# Patient Record
Sex: FEMALE | Race: WHITE
[De-identification: names, ages, dates, MRNs, and addresses within clinical notes are randomized per-mention and may not be internally consistent; named-entity substitution may affect disease eponyms.]

---

## 2017-05-02 ENCOUNTER — HOSPITAL (OUTPATIENT)
Dept: OTHER | Age: 42
End: 2017-05-02
Attending: INTERNAL MEDICINE

## 2017-06-08 ENCOUNTER — PRIOR ORIGINAL RECORDS (OUTPATIENT)
Dept: OTHER | Age: 42
End: 2017-06-08

## 2018-05-14 ENCOUNTER — LAB ENCOUNTER (OUTPATIENT)
Dept: LAB | Facility: HOSPITAL | Age: 43
End: 2018-05-14
Attending: INTERNAL MEDICINE
Payer: MEDICAID

## 2018-05-14 DIAGNOSIS — R53.83 FATIGUE, UNSPECIFIED TYPE: ICD-10-CM

## 2018-05-14 DIAGNOSIS — M25.50 POLYARTHRALGIA: ICD-10-CM

## 2018-05-14 PROCEDURE — 86038 ANTINUCLEAR ANTIBODIES: CPT

## 2018-05-14 PROCEDURE — 84439 ASSAY OF FREE THYROXINE: CPT

## 2018-05-14 PROCEDURE — 86039 ANTINUCLEAR ANTIBODIES (ANA): CPT

## 2018-05-14 PROCEDURE — 86431 RHEUMATOID FACTOR QUANT: CPT

## 2018-05-14 PROCEDURE — 86160 COMPLEMENT ANTIGEN: CPT

## 2018-05-14 PROCEDURE — 82550 ASSAY OF CK (CPK): CPT

## 2018-05-14 PROCEDURE — 84481 FREE ASSAY (FT-3): CPT

## 2018-05-14 PROCEDURE — 80076 HEPATIC FUNCTION PANEL: CPT

## 2018-05-14 PROCEDURE — 84443 ASSAY THYROID STIM HORMONE: CPT

## 2018-05-14 PROCEDURE — 85652 RBC SED RATE AUTOMATED: CPT

## 2018-05-14 PROCEDURE — 82306 VITAMIN D 25 HYDROXY: CPT

## 2018-05-14 PROCEDURE — 86200 CCP ANTIBODY: CPT

## 2018-05-14 PROCEDURE — 36415 COLL VENOUS BLD VENIPUNCTURE: CPT

## 2018-05-14 NOTE — H&P
Dear Dr. Winifred Rojas:    I saw your patient Leora Mark in consultation this afternoon at your request for evaluation of fatigue and polyarthralgias. As you know, she is a 42-year-old woman with history of severe anxiety. She has been ill for 3 years.   You It takes variable amounts of times to loosen. No fevers or chills. She has night sweats. Her appetite is low. She thinks she is lost 13 pounds in the last 3 weeks from stress. No rash. She has noted hair thinning for years.   No internal eye inflammat

## 2018-05-15 ENCOUNTER — TELEPHONE (OUTPATIENT)
Dept: RHEUMATOLOGY | Facility: CLINIC | Age: 43
End: 2018-05-15

## 2018-05-15 NOTE — TELEPHONE ENCOUNTER
Pt calling in to have her results read for her. She is aware that there are 3 labs that are still being processed, but she is asking to know what they've said this far. Pt states that she is VERY anxious to have a call back. Please call back and advise.

## 2018-05-15 NOTE — TELEPHONE ENCOUNTER
Results given to pt with recommendations to F/U with Dr. Pete Nava for abnormal thyroid function. Patient verbalized understanding and agreeable with plan. No further questions at this time. Pt will call office back with any questions.  Copy of lab results faxed

## 2018-05-15 NOTE — TELEPHONE ENCOUNTER
Please let her know that so far her inflammation marker, sed rate, is normal.  Her liver panel is normal.  Her muscle enzyme, CPK is normal.  Her complements are normal, which are lupus tests. Her rheumatoid arthritis test is negative.     It looks like he

## 2018-05-21 ENCOUNTER — TELEPHONE (OUTPATIENT)
Dept: RHEUMATOLOGY | Facility: CLINIC | Age: 43
End: 2018-05-21

## 2018-05-21 RX ORDER — CHOLECALCIFEROL (VITAMIN D3) 1250 MCG
CAPSULE ORAL
Qty: 12 CAPSULE | Refills: 0 | Status: SHIPPED | OUTPATIENT
Start: 2018-05-21 | End: 2018-10-03 | Stop reason: ALTCHOICE

## 2018-05-21 NOTE — TELEPHONE ENCOUNTER
I spoke with Pressly Lisa over the phone this morning with her lab results from May 14th, 2018. TSH was low at 0.02. Free T4 was normal, but free T3 was borderline high. Sed rate was normal at 6, hepatic panel normal, CPK normal at 80.   C3 and C4 complement

## 2018-06-04 NOTE — TELEPHONE ENCOUNTER
Pt is calling back want to know if she still need to keep her appt or just f/u with the Endo  Please advise

## 2018-06-13 ENCOUNTER — HOSPITAL ENCOUNTER (OUTPATIENT)
Dept: NUCLEAR MEDICINE | Facility: HOSPITAL | Age: 43
Discharge: HOME | End: 2018-06-13
Attending: INTERNAL MEDICINE
Payer: MEDICAID

## 2018-06-13 DIAGNOSIS — E05.90 HYPERTHYROIDISM: ICD-10-CM

## 2018-06-13 PROCEDURE — 78014 THYROID IMAGING W/BLOOD FLOW: CPT | Performed by: INTERNAL MEDICINE

## 2018-06-25 ENCOUNTER — HOSPITAL ENCOUNTER (OUTPATIENT)
Dept: ULTRASOUND IMAGING | Facility: HOSPITAL | Age: 43
Discharge: HOME | End: 2018-06-25
Attending: INTERNAL MEDICINE
Payer: MEDICAID

## 2018-06-25 DIAGNOSIS — E04.9 GOITER, NODULAR: ICD-10-CM

## 2018-06-25 PROCEDURE — 76536 US EXAM OF HEAD AND NECK: CPT | Performed by: INTERNAL MEDICINE

## 2018-07-06 ENCOUNTER — APPOINTMENT (OUTPATIENT)
Dept: LAB | Facility: HOSPITAL | Age: 43
End: 2018-07-06
Attending: INTERNAL MEDICINE
Payer: MEDICAID

## 2018-07-06 ENCOUNTER — OFFICE VISIT (OUTPATIENT)
Dept: ENDOCRINOLOGY CLINIC | Facility: CLINIC | Age: 43
End: 2018-07-06

## 2018-07-06 VITALS
DIASTOLIC BLOOD PRESSURE: 76 MMHG | BODY MASS INDEX: 20.06 KG/M2 | SYSTOLIC BLOOD PRESSURE: 109 MMHG | HEIGHT: 57 IN | HEART RATE: 65 BPM | WEIGHT: 93 LBS

## 2018-07-06 DIAGNOSIS — E04.1 THYROID NODULE: Primary | ICD-10-CM

## 2018-07-06 DIAGNOSIS — E05.90 SUBCLINICAL HYPERTHYROIDISM: ICD-10-CM

## 2018-07-06 DIAGNOSIS — E05.90 HYPERTHYROIDISM: ICD-10-CM

## 2018-07-06 LAB
T3FREE SERPL-MCNC: 3.46 PG/ML (ref 2.53–4.29)
T4 FREE SERPL-MCNC: 0.99 NG/DL (ref 0.58–1.64)
TSH SERPL-ACNC: 0.02 UIU/ML (ref 0.45–5.33)

## 2018-07-06 PROCEDURE — 99212 OFFICE O/P EST SF 10 MIN: CPT | Performed by: INTERNAL MEDICINE

## 2018-07-06 PROCEDURE — 99204 OFFICE O/P NEW MOD 45 MIN: CPT | Performed by: INTERNAL MEDICINE

## 2018-07-06 PROCEDURE — 36415 COLL VENOUS BLD VENIPUNCTURE: CPT

## 2018-07-06 PROCEDURE — 84481 FREE ASSAY (FT-3): CPT

## 2018-07-06 PROCEDURE — 84439 ASSAY OF FREE THYROXINE: CPT

## 2018-07-06 PROCEDURE — 84443 ASSAY THYROID STIM HORMONE: CPT

## 2018-07-06 NOTE — H&P
New Patient Evaluation - History and Physical    CONSULT - Reason for Visit:  Thyroid nodules, Subclinical Hyperthyroidism  Requesting Physician: self referral    CHIEF COMPLAINT:  Patient presents with:  Consult: Thyroid nodules       HISTORY OF PRESENT I of children:               Social History Main Topics    Smoking status: Current Every Day Smoker                                                     Packs/day: 1.00      Years: 25.00       Smokeless tobacco: Never Used                        Alcohol use: rashes and no lesions  EXTREMITIES:normal pulses, no edema      DATA:     Pertinent data reviewed    ASSESSMENT AND PLAN:    Patient has subclinical hyperthyroidism. Her TSh was low before and on of the thyroid hormone T3 was high.  T4 , the other thyroid the nodule has grown ( with the idea that cancerous nodules tend to increase in size) and then do POTTS since MMI will not be a definite treatment for overactive ( toxic ) nodules  4. No treatment for now. Repeat labs to see if TSH normalizes in 4-8 weeks.  Sivakumar Philippe

## 2018-07-09 ENCOUNTER — TELEPHONE (OUTPATIENT)
Dept: ENDOCRINOLOGY CLINIC | Facility: CLINIC | Age: 43
End: 2018-07-09

## 2018-07-09 DIAGNOSIS — E05.90 SUBCLINICAL HYPERTHYROIDISM: Primary | ICD-10-CM

## 2018-07-09 NOTE — TELEPHONE ENCOUNTER
I spoke with the patient and explained everything in a lot of detail. Patient will like to talk to Dr. Shen Uriostegui and possibly get a surgeon's opinion. She will call me after she takes a decision. She verbalized understanding of all of the above.

## 2018-07-09 NOTE — TELEPHONE ENCOUNTER
Tried calling patent to discuss the following  This has already been discussed with daughter and patient during appointment  New information is that thyroid hormone levels are normal now.    Hence, want to make sure they understand everything with this new precautions have been discussed with her. 2. Attempt biopsy on the cold nodule now. Then proceed with POTTS  3. Treat with Methimazole for a few months to try to normalize TSH and to see if it helps treat her anxiety.    Repeat thyroid US in 5-6 months to

## 2018-07-09 NOTE — TELEPHONE ENCOUNTER
Patient call back. Reviewed message below. She does not understand. Requesting to speak with MD today.

## 2018-07-13 ENCOUNTER — APPOINTMENT (OUTPATIENT)
Dept: LAB | Facility: HOSPITAL | Age: 43
End: 2018-07-13
Attending: NUCLEAR MEDICINE
Payer: MEDICAID

## 2018-07-13 DIAGNOSIS — E05.90 HYPERTHYROIDISM: ICD-10-CM

## 2018-07-13 DIAGNOSIS — Z01.812 PRE-PROCEDURE LAB EXAM: ICD-10-CM

## 2018-07-13 LAB — B-HCG UR QL: NEGATIVE

## 2018-07-13 PROCEDURE — 81025 URINE PREGNANCY TEST: CPT

## 2018-07-16 ENCOUNTER — HOSPITAL ENCOUNTER (OUTPATIENT)
Dept: NUCLEAR MEDICINE | Facility: HOSPITAL | Age: 43
Discharge: HOME | End: 2018-07-16
Attending: INTERNAL MEDICINE
Payer: MEDICAID

## 2018-07-16 DIAGNOSIS — E05.90 SUBCLINICAL HYPERTHYROIDISM: ICD-10-CM

## 2018-07-16 PROCEDURE — 79005 NUCLEAR RX ORAL ADMIN: CPT | Performed by: INTERNAL MEDICINE

## 2018-07-17 ENCOUNTER — TELEPHONE (OUTPATIENT)
Dept: ENDOCRINOLOGY CLINIC | Facility: CLINIC | Age: 43
End: 2018-07-17

## 2018-07-17 DIAGNOSIS — E89.0 POSTABLATIVE HYPOTHYROIDISM: Primary | ICD-10-CM

## 2018-07-17 NOTE — TELEPHONE ENCOUNTER
Called the patient. Informed her of Dr. Guevara Spearing instructions below.     Discussed symptoms of hypothyroidism with the patient which include: fatigue, cold intolerance, hair loss, dry skin, unexplained weight gain, constipation, abnormally slow heart rat

## 2018-07-17 NOTE — TELEPHONE ENCOUNTER
Patient underwent POTTS for hyperthyroidism today  Please let her know that she should get TSH, Free T4 and Free T3 in 4 weeks and call for results  Thanks

## 2018-07-22 PROBLEM — E04.1 THYROID NODULE: Status: ACTIVE | Noted: 2018-07-22

## 2018-07-22 PROBLEM — E05.90 SUBCLINICAL HYPERTHYROIDISM: Status: ACTIVE | Noted: 2018-07-22

## 2018-08-14 ENCOUNTER — APPOINTMENT (OUTPATIENT)
Dept: LAB | Facility: HOSPITAL | Age: 43
End: 2018-08-14
Attending: INTERNAL MEDICINE
Payer: MEDICAID

## 2018-08-14 DIAGNOSIS — E89.0 POSTABLATIVE HYPOTHYROIDISM: ICD-10-CM

## 2018-08-14 LAB
T3FREE SERPL-MCNC: 3.12 PG/ML (ref 2.53–4.29)
T4 FREE SERPL-MCNC: 0.71 NG/DL (ref 0.58–1.64)
TSH SERPL-ACNC: 0.78 UIU/ML (ref 0.45–5.33)

## 2018-08-14 PROCEDURE — 84443 ASSAY THYROID STIM HORMONE: CPT

## 2018-08-14 PROCEDURE — 84481 FREE ASSAY (FT-3): CPT

## 2018-08-14 PROCEDURE — 84439 ASSAY OF FREE THYROXINE: CPT

## 2018-08-14 PROCEDURE — 36415 COLL VENOUS BLD VENIPUNCTURE: CPT

## 2018-08-15 ENCOUNTER — TELEPHONE (OUTPATIENT)
Dept: ENDOCRINOLOGY CLINIC | Facility: CLINIC | Age: 43
End: 2018-08-15

## 2018-08-15 DIAGNOSIS — E03.9 HYPOTHYROIDISM, UNSPECIFIED TYPE: Primary | ICD-10-CM

## 2018-08-15 NOTE — TELEPHONE ENCOUNTER
Pt calling for for results from her labs,pls call JL:[phone],[facility].

## 2018-08-17 ENCOUNTER — TELEPHONE (OUTPATIENT)
Dept: ENDOCRINOLOGY CLINIC | Facility: CLINIC | Age: 43
End: 2018-08-17

## 2018-08-17 NOTE — TELEPHONE ENCOUNTER
A slight abnormal sensation an change in taste sensation is common after POTTS  Usually resolves in a few weeks. Should call if it gets worse.    Thanks

## 2018-08-17 NOTE — TELEPHONE ENCOUNTER
Spoke with patient and advised below. She will contact office if symptoms do not improve in next few weeks.

## 2018-08-17 NOTE — TELEPHONE ENCOUNTER
Labs normal  She is s/p POTTS so have to continue to monitor for hypothyroidism  Repeat TSH and Free T4 in 6-8 weeks / sooner if she devdlops hypothyroid symptoms  Thanks

## 2018-08-17 NOTE — TELEPHONE ENCOUNTER
Called the patient and cancelled appt for today per AM. Patient will repeat labs as recently instructed. Patient states that she had POTTS 1 month ago. She still has some discomfort in her throat. She states it feels like a sore throat.  Not all the time most

## 2018-08-17 NOTE — TELEPHONE ENCOUNTER
Spoke with patient and informed her of normal results. Understands plan to return to lab in 6-8 weeks for repeat draw.

## 2018-09-04 ENCOUNTER — TELEPHONE (OUTPATIENT)
Dept: ENDOCRINOLOGY CLINIC | Facility: CLINIC | Age: 43
End: 2018-09-04

## 2018-09-04 NOTE — TELEPHONE ENCOUNTER
Spoke with patient. She last had labs drawn 8/14 and was advised to return to lab in 6-8 weeks. Informed her and did confirm orders are in the chart.

## 2018-09-25 ENCOUNTER — APPOINTMENT (OUTPATIENT)
Dept: LAB | Facility: HOSPITAL | Age: 43
End: 2018-09-25
Attending: INTERNAL MEDICINE
Payer: MEDICAID

## 2018-09-25 DIAGNOSIS — E03.9 HYPOTHYROIDISM, UNSPECIFIED TYPE: ICD-10-CM

## 2018-09-25 LAB
T4 FREE SERPL-MCNC: <0.25 NG/DL (ref 0.58–1.64)
TSH SERPL-ACNC: 65.85 UIU/ML (ref 0.45–5.33)

## 2018-09-25 PROCEDURE — 36415 COLL VENOUS BLD VENIPUNCTURE: CPT

## 2018-09-25 PROCEDURE — 84443 ASSAY THYROID STIM HORMONE: CPT

## 2018-09-25 PROCEDURE — 84439 ASSAY OF FREE THYROXINE: CPT

## 2018-09-25 NOTE — TELEPHONE ENCOUNTER
Pt states per [facility] she was advised to completed labs in 6-8 weeks and is inquiring if she can complete labs sooner.  Please call thank you [phone]

## 2018-09-25 NOTE — TELEPHONE ENCOUNTER
Spoke with patient and informed her she is due for labs this week this would be 6 weeks from last lab draw. She would like to go today states she has had some swelling around eyes and feeling cold. She feels the levels are off.  Routed to AM as FYI for when

## 2018-09-26 ENCOUNTER — TELEPHONE (OUTPATIENT)
Dept: ENDOCRINOLOGY CLINIC | Facility: CLINIC | Age: 43
End: 2018-09-26

## 2018-09-26 DIAGNOSIS — E03.9 HYPOTHYROIDISM, UNSPECIFIED TYPE: Primary | ICD-10-CM

## 2018-09-26 RX ORDER — LEVOTHYROXINE SODIUM 0.07 MG/1
75 TABLET ORAL
Qty: 30 TABLET | Refills: 1 | Status: SHIPPED | OUTPATIENT
Start: 2018-09-26 | End: 2019-06-10

## 2018-09-26 NOTE — TELEPHONE ENCOUNTER
Patient is hypothyroid now   Start on LT 4  Weight based dose is around 75 mcg daily  Start that  Please discuss administration  TSh and Free T4 in 6 weeks and see me in the clinic  We will also talk about scheduling  her thyroid US at that time  Please he

## 2018-09-26 NOTE — TELEPHONE ENCOUNTER
Some of these could be related to hypothyroidism. So I recommend that she start the medication and if her symptoms do not start improving in the next few days/ get worse, she should let us know  Thanks.

## 2018-09-26 NOTE — TELEPHONE ENCOUNTER
Spoke with patient. She will fill her prescription for LT4 and will call us on Monday if her symptoms persist or worsen.

## 2018-09-26 NOTE — TELEPHONE ENCOUNTER
Spoke with patient. She is agreeable to starting LT4. Patient verbalizes understanding of correct administration of medication. Ordered blood work per [place] note. Scheduled follow up for 11/12/18.     Of note, patient reporting current symptoms: facial swell

## 2018-10-03 PROCEDURE — 81003 URINALYSIS AUTO W/O SCOPE: CPT | Performed by: FAMILY MEDICINE

## 2018-10-12 ENCOUNTER — TELEPHONE (OUTPATIENT)
Dept: ENDOCRINOLOGY CLINIC | Facility: CLINIC | Age: 43
End: 2018-10-12

## 2018-10-12 NOTE — TELEPHONE ENCOUNTER
Pt states that she was discharged yesterday from [facility] and was told to call Dr. Lauren Landry re: adjusting thyroid medication. Please call.

## 2018-10-12 NOTE — TELEPHONE ENCOUNTER
AM -please advise    Pt was just discharged from [facility] yesterday and was advised to have thyroid medication adjusted. Pt states she is recuperating from [place] tear in neck\".      Pt has apt on 11/12 which she will keep but asking to have med adjusted in t

## 2018-10-12 NOTE — TELEPHONE ENCOUNTER
Returned call to patient and advised of below. She understands to repeat labs 6 weeks after starting 75mcg as currently too soon to advise on dose adjustment. Orders in chart.  She will keep appt as scheduled on 11/12

## 2018-10-12 NOTE — TELEPHONE ENCOUNTER
I am sorry that she is not feeling well.    She is s/p POTTS  She was started on LT 4 75 mcg daily on 9/25  This is close to her weigt based on dose  We need t vitor 6 weeks before we adjust medication kiana  Thanks

## 2018-10-15 ENCOUNTER — PRIOR ORIGINAL RECORDS (OUTPATIENT)
Dept: OTHER | Age: 43
End: 2018-10-15

## 2018-10-18 ENCOUNTER — OFFICE VISIT (OUTPATIENT)
Dept: SURGERY | Facility: CLINIC | Age: 43
End: 2018-10-18
Payer: MEDICARE

## 2018-10-18 VITALS
DIASTOLIC BLOOD PRESSURE: 72 MMHG | HEART RATE: 66 BPM | WEIGHT: 90 LBS | HEIGHT: 57 IN | BODY MASS INDEX: 19.41 KG/M2 | SYSTOLIC BLOOD PRESSURE: 118 MMHG

## 2018-10-18 DIAGNOSIS — I77.74 VERTEBRAL ARTERY DISSECTION (HCC): Primary | ICD-10-CM

## 2018-10-18 PROCEDURE — 99203 OFFICE O/P NEW LOW 30 MIN: CPT | Performed by: RADIOLOGY

## 2018-10-18 RX ORDER — TRAZODONE HYDROCHLORIDE 50 MG/1
1 TABLET ORAL AS NEEDED
Refills: 0 | COMMUNITY
Start: 2018-10-15 | End: 2019-09-26 | Stop reason: ALTCHOICE

## 2018-10-18 RX ORDER — IBUPROFEN 800 MG/1
1 TABLET ORAL AS NEEDED
Refills: 1 | COMMUNITY
Start: 2018-05-31 | End: 2019-09-26 | Stop reason: ALTCHOICE

## 2018-10-18 RX ORDER — PAROXETINE 10 MG/1
40 TABLET, FILM COATED ORAL DAILY
COMMUNITY
Start: 2017-10-09 | End: 2019-09-26 | Stop reason: ALTCHOICE

## 2018-10-18 NOTE — PROGRESS NOTES
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     HPI    On 10/10/18 capsule Rfl: 0   atorvastatin 10 MG Oral Tab Take 1 tablet (10 mg total) by mouth daily.  (Patient taking differently: Take 20 mg by mouth daily.  ) Disp: 90 tablet Rfl: 0   Levothyroxine Sodium 75 MCG Oral Tab Take 1 tablet (75 mcg total) by mouth before b Not Asked        Self-Exams: Not Asked    Social History Narrative      Not on file    Family History   Family history unknown: Yes         PHYSICAL EXAM:   Physical Exam    /72   Pulse 66   Ht 57\"   Wt 90 lb   LMP 09/19/2018 (Approximate)   BMI 19.

## 2018-10-18 NOTE — PATIENT INSTRUCTIONS
Refill policies:    • Allow 2-3 business days for refills; controlled substances may take longer.   • Contact your pharmacy at least 5 days prior to running out of medication and have them send an electronic request or submit request through the “request re entire amount billed. Precertification and Prior Authorizations: If your physician has recommended that you have a procedure or additional testing performed.   [facility]) will contact your insurance carrier to obtain pre-certi

## 2018-10-18 NOTE — PROGRESS NOTES
New Pt is here for vertebral artery dissection.        Review of Systems:    Hand Dominance: right  General: fatigue  Neuro: no symptoms reported  Head: no symptoms reported  Musculoskeletal: no symptoms reported  Cardiovascular: no symptoms reported  [facility]

## 2018-10-29 PROBLEM — I65.23 CAROTID STENOSIS, ASYMPTOMATIC, BILATERAL: Status: ACTIVE | Noted: 2018-10-29

## 2018-11-09 ENCOUNTER — APPOINTMENT (OUTPATIENT)
Dept: LAB | Facility: HOSPITAL | Age: 43
End: 2018-11-09
Attending: INTERNAL MEDICINE
Payer: MEDICAID

## 2018-11-09 DIAGNOSIS — E03.9 HYPOTHYROIDISM, UNSPECIFIED TYPE: ICD-10-CM

## 2018-11-09 PROCEDURE — 84439 ASSAY OF FREE THYROXINE: CPT

## 2018-11-09 PROCEDURE — 84443 ASSAY THYROID STIM HORMONE: CPT

## 2018-11-09 PROCEDURE — 36415 COLL VENOUS BLD VENIPUNCTURE: CPT

## 2018-11-12 ENCOUNTER — TELEPHONE (OUTPATIENT)
Dept: ENDOCRINOLOGY CLINIC | Facility: CLINIC | Age: 43
End: 2018-11-12

## 2018-11-12 ENCOUNTER — OFFICE VISIT (OUTPATIENT)
Dept: ENDOCRINOLOGY CLINIC | Facility: CLINIC | Age: 43
End: 2018-11-12
Payer: MEDICAID

## 2018-11-12 VITALS
HEART RATE: 64 BPM | DIASTOLIC BLOOD PRESSURE: 76 MMHG | WEIGHT: 97 LBS | BODY MASS INDEX: 20.93 KG/M2 | HEIGHT: 57 IN | SYSTOLIC BLOOD PRESSURE: 117 MMHG

## 2018-11-12 DIAGNOSIS — R22.1 NECK FULLNESS: ICD-10-CM

## 2018-11-12 DIAGNOSIS — E03.9 HYPOTHYROIDISM, UNSPECIFIED TYPE: Primary | ICD-10-CM

## 2018-11-12 PROCEDURE — 99213 OFFICE O/P EST LOW 20 MIN: CPT | Performed by: INTERNAL MEDICINE

## 2018-11-12 PROCEDURE — 99212 OFFICE O/P EST SF 10 MIN: CPT | Performed by: INTERNAL MEDICINE

## 2018-11-12 RX ORDER — LEVOTHYROXINE SODIUM 88 UG/1
88 TABLET ORAL
Qty: 60 TABLET | Refills: 0 | Status: SHIPPED | OUTPATIENT
Start: 2018-11-12 | End: 2019-01-08

## 2018-11-12 NOTE — TELEPHONE ENCOUNTER
Needs thyroid US in March 2019  Needs a PA  Order is in  Please PA around that time and let the patient know. Thanks.

## 2018-11-12 NOTE — PROGRESS NOTES
Return Office Visit     CHIEF COMPLAINT:    Hypothyroidism     HISTORY OF PRESENT ILLNESS:  Anthony Gill is a 37year old female who presents for follow up for hypothyroidism. She was diagnosed with subclinical hyperthyroidism.    She is s/p POTTS in 8 dyspnea, cough  Cardiovascular: Negative for:  chest pain, palpitations, orthopnea  GI: Negative for:  abdominal pain, nausea, vomiting, diarrhea, constipation, bleeding  Neurology: Negative for: headache, numbness, weakness  Genito-Urinary: Negative for: 1 cm in size. Patient will like to FU on that. PE no nodule. Will get a thyroid US in a few months. Patient verbalized a complete  understanding of all of the above and did not have any further questions.          Orders Placed This Encounter

## 2018-12-26 ENCOUNTER — APPOINTMENT (OUTPATIENT)
Dept: LAB | Facility: HOSPITAL | Age: 43
End: 2018-12-26
Attending: INTERNAL MEDICINE
Payer: MEDICAID

## 2018-12-26 DIAGNOSIS — E03.9 HYPOTHYROIDISM, UNSPECIFIED TYPE: ICD-10-CM

## 2018-12-26 PROCEDURE — 84443 ASSAY THYROID STIM HORMONE: CPT

## 2018-12-26 PROCEDURE — 84439 ASSAY OF FREE THYROXINE: CPT

## 2018-12-26 PROCEDURE — 36415 COLL VENOUS BLD VENIPUNCTURE: CPT

## 2018-12-28 ENCOUNTER — TELEPHONE (OUTPATIENT)
Dept: ENDOCRINOLOGY CLINIC | Facility: CLINIC | Age: 43
End: 2018-12-28

## 2018-12-28 DIAGNOSIS — E03.9 HYPOTHYROIDISM, UNSPECIFIED TYPE: Primary | ICD-10-CM

## 2019-01-02 ENCOUNTER — TELEPHONE (OUTPATIENT)
Dept: ENDOCRINOLOGY CLINIC | Facility: CLINIC | Age: 44
End: 2019-01-02

## 2019-01-08 RX ORDER — LEVOTHYROXINE SODIUM 88 UG/1
TABLET ORAL
Qty: 30 TABLET | Refills: 2 | Status: SHIPPED | OUTPATIENT
Start: 2019-01-08 | End: 2019-02-04 | Stop reason: DRUGHIGH

## 2019-01-29 ENCOUNTER — TELEPHONE (OUTPATIENT)
Dept: ENDOCRINOLOGY CLINIC | Facility: CLINIC | Age: 44
End: 2019-01-29

## 2019-01-29 DIAGNOSIS — E03.9 HYPOTHYROIDISM, UNSPECIFIED TYPE: Primary | ICD-10-CM

## 2019-01-29 NOTE — TELEPHONE ENCOUNTER
Spoke with Joe Elizabeth. She states that for the past two weeks she has been experiencing shaky hands, increased nervousness, achy all over and sweating. She states the last time she felt this way was when she was hyperthyroid.  Patient states she is currently ta

## 2019-01-29 NOTE — TELEPHONE ENCOUNTER
Spoke with Joe Elizabeth. She verbalizes understanding of having her TSH and Free T4 checked. She will take 1/2 of her normal LT4 dose until she hears from us regarding lab results.  She is aware clinic is closed tomorrow due to weather but RN advised her if lab r

## 2019-01-31 ENCOUNTER — APPOINTMENT (OUTPATIENT)
Dept: LAB | Facility: HOSPITAL | Age: 44
End: 2019-01-31
Attending: INTERNAL MEDICINE
Payer: MEDICAID

## 2019-01-31 DIAGNOSIS — E03.9 HYPOTHYROIDISM, UNSPECIFIED TYPE: ICD-10-CM

## 2019-01-31 LAB
T4 FREE SERPL-MCNC: 0.93 NG/DL (ref 0.58–1.64)
TSH SERPL-ACNC: 2.01 UIU/ML (ref 0.45–5.33)

## 2019-01-31 PROCEDURE — 84439 ASSAY OF FREE THYROXINE: CPT

## 2019-01-31 PROCEDURE — 36415 COLL VENOUS BLD VENIPUNCTURE: CPT

## 2019-01-31 PROCEDURE — 84443 ASSAY THYROID STIM HORMONE: CPT

## 2019-02-01 ENCOUNTER — TELEPHONE (OUTPATIENT)
Dept: ENDOCRINOLOGY CLINIC | Facility: CLINIC | Age: 44
End: 2019-02-01

## 2019-02-01 DIAGNOSIS — E03.9 HYPOTHYROIDISM, UNSPECIFIED TYPE: Primary | ICD-10-CM

## 2019-02-01 NOTE — TELEPHONE ENCOUNTER
Spoke with Talita Cary and let her know AM is out until Monday and we will call her as soon as provider has reviewed her results.

## 2019-02-04 RX ORDER — LEVOTHYROXINE SODIUM 0.07 MG/1
75 TABLET ORAL
Qty: 30 TABLET | Refills: 2 | Status: SHIPPED | OUTPATIENT
Start: 2019-02-04 | End: 2019-06-10

## 2019-02-04 NOTE — TELEPHONE ENCOUNTER
So her TSH is a reflection of the 88 mcg daily dose. However, since she feels better on half the dose, let us try 75 mcg daily. I do not want to decrease it too much since that might make her hypothyroid.    Repeat TSH and Free T4 in 6 weeks after switc

## 2019-02-04 NOTE — TELEPHONE ENCOUNTER
Spoke with patient and discussed note below from provider. Pt verbalized understanding to decrease dose to 75 mcg and repeat labs in 6 weeks. Med and labs ordered per AM written.

## 2019-03-01 NOTE — TELEPHONE ENCOUNTER
Pt procedure/Testing: Thyroid Ultrasound  Pt insurance/number to contact: [facility] [address] ID# and group: [id number]  Dx: E22.1/ E03.9  CPT: 79201  Indication for test: multinodular goiter, neck fullness  Procedure scheduled date: not yet

## 2019-03-06 NOTE — TELEPHONE ENCOUNTER
Ultrasound is approved for dates 3/1/19 - 4/15/19. Request ID: [id number]. Will scan approval letter to chart. Spoke with [place] and notified her. She is aware of approved date range.

## 2019-03-10 ENCOUNTER — TELEPHONE (OUTPATIENT)
Dept: ENDOCRINOLOGY CLINIC | Facility: CLINIC | Age: 44
End: 2019-03-10

## 2019-03-10 DIAGNOSIS — E03.9 HYPOTHYROIDISM, UNSPECIFIED TYPE: Primary | ICD-10-CM

## 2019-03-10 RX ORDER — LEVOTHYROXINE SODIUM 88 UG/1
88 TABLET ORAL
Qty: 30 TABLET | Refills: 3 | Status: SHIPPED | OUTPATIENT
Start: 2019-03-10 | End: 2019-06-10

## 2019-03-10 NOTE — TELEPHONE ENCOUNTER
Patient was on LT 4 88 mcg daily  However, she  was not feeling well on that dose. Dose was decreased to 75 mcg daily.    She was in the ER and TSH was high  Resumed LT 4 88 mcg daily  Should CPM   Repeat TSH and Free T4 in 6 weeks and FU in the clinic  T

## 2019-03-10 NOTE — TELEPHONE ENCOUNTER
Patient paged she was in ED and TSH was 18, restarted previous dose of LT4 88mcg PO daily.      AM FYI

## 2019-03-11 NOTE — TELEPHONE ENCOUNTER
Spoke with patient and discussed below. She is agreeable with plan to remain at [facility]. She will repeat labs in 6 weeks and then follow up in the office. Orders placed.

## 2019-03-13 ENCOUNTER — TELEPHONE (OUTPATIENT)
Dept: ENDOCRINOLOGY CLINIC | Facility: CLINIC | Age: 44
End: 2019-03-13

## 2019-03-13 ENCOUNTER — HOSPITAL ENCOUNTER (OUTPATIENT)
Dept: ULTRASOUND IMAGING | Facility: HOSPITAL | Age: 44
Discharge: HOME | End: 2019-03-13
Attending: INTERNAL MEDICINE
Payer: MEDICAID

## 2019-03-13 DIAGNOSIS — R22.1 NECK FULLNESS: ICD-10-CM

## 2019-03-13 PROCEDURE — 76536 US EXAM OF HEAD AND NECK: CPT | Performed by: INTERNAL MEDICINE

## 2019-03-13 NOTE — TELEPHONE ENCOUNTER
Patient paged with muscle aches. Discussed that this could be related to hypothyroidism. To call if there is worsening of symptoms.    Please give her a FU call  Thanks

## 2019-03-13 NOTE — TELEPHONE ENCOUNTER
Spoke with Joe Elizabeth. She states she feels better today and she does not verbalize any complaints. She is taking LT4 88 mcg PO daily. She has thyroid u/s scheduled today. She is aware we will call with results once provider has reviewed.

## 2019-03-14 ENCOUNTER — TELEPHONE (OUTPATIENT)
Dept: ENDOCRINOLOGY CLINIC | Facility: CLINIC | Age: 44
End: 2019-03-14

## 2019-03-14 NOTE — TELEPHONE ENCOUNTER
Spoke with patient. Advised that AM is out of the office on Thursdays but will be available to review [address] when in office tomorrow. Patient is anxious for results. Informed her I will try to call her as soon as I have response from provider.

## 2019-03-15 NOTE — TELEPHONE ENCOUNTER
Thyroid US shows that thyroid gland has shrunk considerable after POTTS, but is still there. The right lobe has some nodules that are small, under 1 cm. 0.49 x 0.38 x 0.52 cm and 0.47 x 0.23 x 0.42 cm.      We will repeat US in 6 months  Call if develops

## 2019-04-09 ENCOUNTER — TELEPHONE (OUTPATIENT)
Dept: ENDOCRINOLOGY CLINIC | Facility: CLINIC | Age: 44
End: 2019-04-09

## 2019-04-09 ENCOUNTER — APPOINTMENT (OUTPATIENT)
Dept: LAB | Facility: HOSPITAL | Age: 44
End: 2019-04-09
Attending: INTERNAL MEDICINE
Payer: MEDICAID

## 2019-04-09 DIAGNOSIS — E03.9 HYPOTHYROIDISM, UNSPECIFIED TYPE: ICD-10-CM

## 2019-04-09 PROCEDURE — 84439 ASSAY OF FREE THYROXINE: CPT

## 2019-04-09 PROCEDURE — 36415 COLL VENOUS BLD VENIPUNCTURE: CPT

## 2019-04-09 PROCEDURE — 84443 ASSAY THYROID STIM HORMONE: CPT

## 2019-04-09 NOTE — TELEPHONE ENCOUNTER
Spoke with [place] and notified her of normal thyroid results.  She is currently being evaluated in [facility] ED.

## 2019-04-09 NOTE — TELEPHONE ENCOUNTER
Pt. States that she is not feeling well, and wants to know if she is able to get her blood checked sooner for her thyroid, or is too soon? Pt is not sure if her med needs to be adjusted?

## 2019-04-09 NOTE — TELEPHONE ENCOUNTER
Spoke with patient. She states she has not been feeling well since her dose of LT4 was adjusted. To 88mcg PO daily. She states she has been feeling extremely fatigued and unwell.     She is due for labs per Last encounter- due 6 weeks from dose change begin

## 2019-04-09 NOTE — TELEPHONE ENCOUNTER
Patient called to tell us she's just had blood drawn. Would like a call from Dr. Vanessa Bear as soon as results are available. RN advised that provider is seeing patient's today and we will try to call as soon as possible, but could potentially be 24 hrs.

## 2019-06-10 ENCOUNTER — TELEPHONE (OUTPATIENT)
Dept: ENDOCRINOLOGY CLINIC | Facility: CLINIC | Age: 44
End: 2019-06-10

## 2019-06-10 DIAGNOSIS — E03.9 HYPOTHYROIDISM, UNSPECIFIED TYPE: Primary | ICD-10-CM

## 2019-06-10 RX ORDER — LEVOTHYROXINE SODIUM 88 UG/1
TABLET ORAL
Qty: 30 TABLET | Refills: 0 | Status: SHIPPED | OUTPATIENT
Start: 2019-06-10 | End: 2019-09-26

## 2019-06-10 NOTE — TELEPHONE ENCOUNTER
Please see note below. .. Last filled: 3/10/19 #30 tab with 3 refills   LOV: 11/12/18  No future appointments. Please advise.

## 2019-06-10 NOTE — TELEPHONE ENCOUNTER
Pt calling in regards refill on the following medication       Current Outpatient Medications:   •  Levothyroxine Sodium 88 MCG Oral Tab, Take 1 tablet (88 mcg total) by mouth before breakfast., Disp: 30 tablet, Rfl: 3

## 2019-06-11 RX ORDER — LEVOTHYROXINE SODIUM 88 UG/1
TABLET ORAL
Qty: 30 TABLET | Refills: 1 | Status: SHIPPED | OUTPATIENT
Start: 2019-06-11 | End: 2019-07-09

## 2019-06-12 ENCOUNTER — TELEPHONE (OUTPATIENT)
Dept: ENDOCRINOLOGY CLINIC | Facility: CLINIC | Age: 44
End: 2019-06-12

## 2019-06-12 NOTE — TELEPHONE ENCOUNTER
Current Outpatient Medications:  LEVOTHYROXINE SODIUM 88 MCG Oral Tab Take 1 tablet by mouth before breakfast Disp: 30 tablet Rfl: 1

## 2019-07-08 ENCOUNTER — APPOINTMENT (OUTPATIENT)
Dept: LAB | Facility: HOSPITAL | Age: 44
End: 2019-07-08
Attending: INTERNAL MEDICINE
Payer: MEDICAID

## 2019-07-08 DIAGNOSIS — E03.9 HYPOTHYROIDISM, UNSPECIFIED TYPE: ICD-10-CM

## 2019-07-08 LAB
T4 FREE SERPL-MCNC: 1.3 NG/DL (ref 0.8–1.7)
TSI SER-ACNC: 0.57 MIU/ML (ref 0.36–3.74)

## 2019-07-08 PROCEDURE — 84443 ASSAY THYROID STIM HORMONE: CPT

## 2019-07-08 PROCEDURE — 84439 ASSAY OF FREE THYROXINE: CPT

## 2019-07-08 PROCEDURE — 36415 COLL VENOUS BLD VENIPUNCTURE: CPT

## 2019-07-09 ENCOUNTER — OFFICE VISIT (OUTPATIENT)
Dept: ENDOCRINOLOGY CLINIC | Facility: CLINIC | Age: 44
End: 2019-07-09
Payer: MEDICAID

## 2019-07-09 VITALS
WEIGHT: 87 LBS | BODY MASS INDEX: 19 KG/M2 | SYSTOLIC BLOOD PRESSURE: 112 MMHG | DIASTOLIC BLOOD PRESSURE: 72 MMHG | HEART RATE: 78 BPM

## 2019-07-09 DIAGNOSIS — E03.9 HYPOTHYROIDISM, UNSPECIFIED TYPE: Primary | ICD-10-CM

## 2019-07-09 DIAGNOSIS — E04.1 THYROID NODULE: ICD-10-CM

## 2019-07-09 PROBLEM — E05.90 SUBCLINICAL HYPERTHYROIDISM: Status: RESOLVED | Noted: 2018-07-22 | Resolved: 2019-07-09

## 2019-07-09 PROCEDURE — 99213 OFFICE O/P EST LOW 20 MIN: CPT | Performed by: INTERNAL MEDICINE

## 2019-07-09 NOTE — PROGRESS NOTES
Return Office Visit     CHIEF COMPLAINT:    Hypothyroidism     HISTORY OF PRESENT ILLNESS:  Ian Wright is a 37year old female who presents for follow up for hypothyroidism. She was diagnosed with subclinical hyperthyroidism.    She is s/p POTTS in 8 palpitations, orthopnea  GI: Negative for:  abdominal pain, nausea, vomiting, diarrhea, constipation, bleeding  Neurology: Negative for: headache, numbness, weakness  Genito-Urinary: Negative for: dysuria, frequency  Psychiatric: Negative for:  depression, were placed in this encounter.       Christian Lundberg MD

## 2019-07-29 ENCOUNTER — OFFICE VISIT (OUTPATIENT)
Dept: FAMILY MEDICINE CLINIC | Facility: CLINIC | Age: 44
End: 2019-07-29
Payer: MEDICAID

## 2019-07-29 DIAGNOSIS — Z11.1 SCREENING FOR TUBERCULOSIS: Primary | ICD-10-CM

## 2019-07-29 PROCEDURE — 86580 TB INTRADERMAL TEST: CPT | Performed by: PHYSICIAN ASSISTANT

## 2019-07-29 NOTE — PROGRESS NOTES
David Taylor is a 37year old female who presents for PPD skin testing for TB screening. TUBERCULOSIS SCREENING QUESTIONNAIRE    · Live vaccines in the past month? no  · Any steroid medication in the past month? no  · History of BCG vaccine?

## 2019-07-31 ENCOUNTER — OFFICE VISIT (OUTPATIENT)
Dept: FAMILY MEDICINE CLINIC | Facility: CLINIC | Age: 44
End: 2019-07-31
Payer: MEDICAID

## 2019-07-31 DIAGNOSIS — Z11.1 ENCOUNTER FOR PPD SKIN TEST READING: Primary | ICD-10-CM

## 2019-07-31 LAB — INDURATION (): 0 MM (ref 0–11)

## 2019-08-01 NOTE — PROGRESS NOTES
Patient presents for PPD read.      Recent Results (from the past 24 hour(s))   TB INTRADERMAL TEST    Collection Time: 07/31/19  7:04 PM   Result Value Ref Range    Date Given: 7/29/2019     Site: LFA     INDURATION (PPD) 0 0.0 - 11 mm         Letter of re

## 2019-08-07 ENCOUNTER — TELEPHONE (OUTPATIENT)
Dept: ENDOCRINOLOGY CLINIC | Facility: CLINIC | Age: 44
End: 2019-08-07

## 2019-08-07 DIAGNOSIS — E03.9 HYPOTHYROIDISM, UNSPECIFIED TYPE: Primary | ICD-10-CM

## 2019-08-07 NOTE — TELEPHONE ENCOUNTER
Thyroid US denied for the below reasons.  Dr. Nya Lockett, please advise if you would like to appeal.     Based on [employer] Neck [employer] Guidelines Section: NECK 8.1 Thyroid Nodule, we are unable to approve this request. Guidelines support a neck ultrasound fo

## 2019-08-07 NOTE — TELEPHONE ENCOUNTER
She had US in 3/2019 that showed small nodules. Hence per the insurance guidelines, we will do US in 3 /2019.   Please let the patient know  Should call if she develops compressive symptoms  Thanks

## 2019-09-03 NOTE — TELEPHONE ENCOUNTER
RN spoke with patient about note below from provider.  Patient verbalized understanding to have blood work done asap and we will submit PA w/ new information

## 2019-09-03 NOTE — TELEPHONE ENCOUNTER
Pt requesting Thyroid US be resubmitted w/ new symptoms    Pt states she has difficulty swallowing at night \"which scares me\", throat feels \"bigger than usual\", joint inflammation/aches \"tennis elbow\".  Pt c/o loss of appetite, weight loss, \"I'm nerv

## 2019-09-03 NOTE — TELEPHONE ENCOUNTER
Pt called states she is having symptoms   Feels like she is having trouble swallowing and her throat is getting big please call thank you

## 2019-09-04 NOTE — TELEPHONE ENCOUNTER
PA submitted on [employer]. Under Medical Review.     CPT Code: 42272  Case Number: [id number]  Review Date: 9/4/2019 12:50:52 PM

## 2019-09-26 ENCOUNTER — LAB ENCOUNTER (OUTPATIENT)
Dept: LAB | Facility: HOSPITAL | Age: 44
End: 2019-09-26
Attending: INTERNAL MEDICINE
Payer: MEDICAID

## 2019-09-26 DIAGNOSIS — E03.9 HYPOTHYROIDISM, UNSPECIFIED TYPE: ICD-10-CM

## 2019-09-26 LAB
T4 FREE SERPL-MCNC: 0.8 NG/DL (ref 0.8–1.7)
TSI SER-ACNC: 5.13 MIU/ML (ref 0.36–3.74)

## 2019-09-26 PROCEDURE — 84439 ASSAY OF FREE THYROXINE: CPT

## 2019-09-26 PROCEDURE — 36415 COLL VENOUS BLD VENIPUNCTURE: CPT

## 2019-09-26 PROCEDURE — 84443 ASSAY THYROID STIM HORMONE: CPT

## 2019-09-27 ENCOUNTER — TELEPHONE (OUTPATIENT)
Dept: ENDOCRINOLOGY CLINIC | Facility: CLINIC | Age: 44
End: 2019-09-27

## 2019-09-27 DIAGNOSIS — E03.9 HYPOTHYROIDISM, UNSPECIFIED TYPE: Primary | ICD-10-CM

## 2019-09-27 NOTE — TELEPHONE ENCOUNTER
Patient has a h/o hypothyroidism and anxiety. She has not done well ( symptomatically) on higher doses of LT 4. Recent labs shows that TSH is very slightly high. We have two options:  1.  C/w LT 4 88 mcg daily since TSH is almost normal.   2. Increase t

## 2019-09-27 NOTE — TELEPHONE ENCOUNTER
See referral documentation, recommendation pasted below:     Per Dr. Yvette Hennessy, will have US done in 03/2020 (1 year f/u)        Pt notified with understanding.

## 2019-09-27 NOTE — TELEPHONE ENCOUNTER
Spoke with pt. States she ran out of her medication and had been off of it for about a week when she had her blood drawn. She has resumed the 88mcg daily. AM, would you like her to continue with this and have blood drawn again in 6-8 weeks?   She does no

## 2019-09-27 NOTE — TELEPHONE ENCOUNTER
Pt has plenty of medication and does not need refill at this time. Pt aware to have labs in 6 weeks. Pt started 88 mcg today. No further questions at this time.

## 2019-09-28 PROBLEM — F41.9 ANXIETY AND DEPRESSION: Status: ACTIVE | Noted: 2019-09-28

## 2019-09-28 PROBLEM — E78.49 FAMILIAL HYPERLIPIDEMIA, HIGH LDL: Status: ACTIVE | Noted: 2019-09-28

## 2019-09-28 PROBLEM — E03.9 HYPOTHYROIDISM (ACQUIRED): Status: ACTIVE | Noted: 2018-11-12

## 2019-09-28 PROBLEM — F32.A ANXIETY AND DEPRESSION: Status: ACTIVE | Noted: 2019-09-28

## 2019-09-28 NOTE — PROGRESS NOTES
CC:  Establish Care (Np presents to clinic to establish care. ); Lab Results (Would like to discuss results from today-->thyroid studies. ); and Weight Loss (Losing weight.)      Hx of CC:  INCREASING ANXIETY/DEPRESSION OVER PAST COUPLE OF MONTHS.   NO APPE Rosuvastatin Calcium 10 MG Oral Tab; Take 1 tablet (10 mg total) by mouth nightly. Dispense: 30 tablet; Refill: 5    RTC 3 WEEKS    None  No orders of the defined types were placed in this encounter.

## 2019-10-29 ENCOUNTER — TELEPHONE (OUTPATIENT)
Dept: INTERNAL MEDICINE CLINIC | Facility: CLINIC | Age: 44
End: 2019-10-29

## 2019-10-29 NOTE — TELEPHONE ENCOUNTER
Took two doses of prozac at bedtime as prescribed, but felt more depressed in the morning (heavy, emptiness in chest). Afraid of all  the side effects.    Tried to educate patient that the likelyhood of all the side effects affect any one person is very lo

## 2019-10-29 NOTE — TELEPHONE ENCOUNTER
Pt LVM regarding a place she found that will take her right away. Can you please fax referral to them @ [phone], please call her after you have sent so she can get an appointment made.     Thank you  Ten Mckenna

## 2019-10-29 NOTE — TELEPHONE ENCOUNTER
Pt called and states that she needs to see a psychiatrist, she states Dr Geronimo Lambert gave her some medication at her last visit but she's scared to take it because of side effects. Pt states that she NEEDS to talk to someone.

## 2019-10-29 NOTE — TELEPHONE ENCOUNTER
[facility]   [phone]  Fax: [phone] [address], [place] [address]  Hours:   Closed ?  Opens 8:30AM Wed    Insurance needs:  Clinic name / address/  NPI / [address] [place] [address]  Name of MD seeing patient  CPT codes for the visit  ICD 10 codes  How many visits do t

## 2019-10-30 NOTE — TELEPHONE ENCOUNTER
Spoke with office. Staff that does referrals will be in  Around 10am.  Left message for her to call me asap.   Facility Tax ID: [id number]  CPT:  [id number]  And  [id number]  ~~~~~~~~~~~~~~~    Faxed authorized referral at 11:55am.

## 2019-11-01 PROBLEM — F51.04 PSYCHOPHYSIOLOGICAL INSOMNIA: Status: ACTIVE | Noted: 2019-11-01

## 2019-11-01 NOTE — PROGRESS NOTES
CC:  Depression (patient presents with  for increased saddness & weight loss)      Hx of CC:  F/UP ON DEPRESSION/ANXIETY. DID NOT TAKE FLUOXETINE--READ \"BAD\" STUFF ABOUT IT ON THE INTERNET.   CLONAZEPAM INITIALLY HELPED SOME WITH ANXIETY BUT NO LO 25 MG Oral Tab; Take 1 tablet (25 mg total) by mouth nightly. Dispense: 30 tablet; Refill: 5  - clonazePAM 1 MG Oral Tab; Take 1 tablet (1 mg total) by mouth 3 (three) times daily as needed for Anxiety. Dispense: 90 tablet; Refill: 0    3.  Hypothyroidism

## 2019-11-15 ENCOUNTER — TELEPHONE (OUTPATIENT)
Dept: INTERNAL MEDICINE CLINIC | Facility: CLINIC | Age: 44
End: 2019-11-15

## 2019-11-15 RX ORDER — FLUOXETINE HYDROCHLORIDE 40 MG/1
40 CAPSULE ORAL DAILY
Qty: 30 CAPSULE | Refills: 2 | Status: SHIPPED | OUTPATIENT
Start: 2019-11-15 | End: 2020-03-02

## 2019-11-15 NOTE — TELEPHONE ENCOUNTER
No improvement with anxiety (on fluoxetine 20 mg qd) but is sleeping more with seroquel    Increasing fluoxetine to 40 mg qd, will f/up with me on 11/21/19

## 2019-11-18 ENCOUNTER — TELEPHONE (OUTPATIENT)
Dept: ENDOCRINOLOGY CLINIC | Facility: CLINIC | Age: 44
End: 2019-11-18

## 2019-11-18 ENCOUNTER — APPOINTMENT (OUTPATIENT)
Dept: LAB | Facility: HOSPITAL | Age: 44
End: 2019-11-18
Attending: INTERNAL MEDICINE
Payer: MEDICAID

## 2019-11-18 ENCOUNTER — TELEPHONE (OUTPATIENT)
Dept: INTERNAL MEDICINE CLINIC | Facility: CLINIC | Age: 44
End: 2019-11-18

## 2019-11-18 DIAGNOSIS — E03.9 HYPOTHYROIDISM, UNSPECIFIED TYPE: ICD-10-CM

## 2019-11-18 DIAGNOSIS — N63.0 BREAST NODULE: Primary | ICD-10-CM

## 2019-11-18 PROCEDURE — 84443 ASSAY THYROID STIM HORMONE: CPT

## 2019-11-18 PROCEDURE — 36415 COLL VENOUS BLD VENIPUNCTURE: CPT

## 2019-11-18 PROCEDURE — 84439 ASSAY OF FREE THYROXINE: CPT

## 2019-11-18 NOTE — TELEPHONE ENCOUNTER
Spoke with Bethel Clara. Discussed note from AM. Also, patient is due to repeat TSH and FT4 per previous enc dated 9/27/19. Orders are already in her chart. She states she will try to do blood work today. Also booked FU with AM (first available) on 1/13/20.  Marlene

## 2019-11-18 NOTE — TELEPHONE ENCOUNTER
Pt reports losing weight rapidly the last month. Pt feeling depressed, anxious, (started on antidepressant by PCP), hair falling out, burning sensation in hands and in feet. Pt thinks this is all thyroid related. Confirms she's taking LT 4 88mcg daily.

## 2019-11-18 NOTE — TELEPHONE ENCOUNTER
I am sorry but I only test TSH and Free T4  If she is interested in more testing for symptoms/ holistic approach etc, she can consider talking to DEMET[facility] Vallejo.    Thanks

## 2019-11-18 NOTE — TELEPHONE ENCOUNTER
Pt called and states that she found a lump in her right breast and wants to have a mammogram done. Pt wants Dr Tyler Lomax to put in the order for this.

## 2019-11-18 NOTE — TELEPHONE ENCOUNTER
Pt states she has gained weight, hair loss and burning sensation in hands and feet.  Please call [phone]

## 2019-11-19 ENCOUNTER — TELEPHONE (OUTPATIENT)
Dept: INTERNAL MEDICINE CLINIC | Facility: CLINIC | Age: 44
End: 2019-11-19

## 2019-11-19 ENCOUNTER — TELEPHONE (OUTPATIENT)
Dept: ENDOCRINOLOGY CLINIC | Facility: CLINIC | Age: 44
End: 2019-11-19

## 2019-11-19 DIAGNOSIS — E03.9 HYPOTHYROIDISM, UNSPECIFIED TYPE: Primary | ICD-10-CM

## 2019-11-19 DIAGNOSIS — N63.0 BREAST NODULE: Primary | ICD-10-CM

## 2019-11-19 NOTE — TELEPHONE ENCOUNTER
Pt called and states that Dr Buffy Patel increased her thyroid medication per her request and now she found out that she has a hyperactive thyroid and wants to go back to the 20mg dosage until she comes for her visit on the 26th.  Pt wants to know if this is safe

## 2019-11-19 NOTE — TELEPHONE ENCOUNTER
Thyroid medication needs dose adjustment based on labs  She is on LT 4 88 mcg daily  Decrease to 75 mcg daily  TSh and Free t4 on 6 weeks and FU in clinic  Please book  Thanks

## 2019-11-20 ENCOUNTER — HOSPITAL ENCOUNTER (OUTPATIENT)
Dept: ULTRASOUND IMAGING | Facility: HOSPITAL | Age: 44
Discharge: HOME | End: 2019-11-20
Attending: FAMILY MEDICINE
Payer: MEDICAID

## 2019-11-20 ENCOUNTER — HOSPITAL ENCOUNTER (OUTPATIENT)
Dept: MAMMOGRAPHY | Facility: HOSPITAL | Age: 44
Discharge: HOME | End: 2019-11-20
Attending: FAMILY MEDICINE
Payer: MEDICAID

## 2019-11-20 DIAGNOSIS — N63.0 BREAST NODULE: ICD-10-CM

## 2019-11-20 PROCEDURE — 76642 ULTRASOUND BREAST LIMITED: CPT | Performed by: FAMILY MEDICINE

## 2019-11-20 PROCEDURE — 77062 BREAST TOMOSYNTHESIS BI: CPT | Performed by: FAMILY MEDICINE

## 2019-11-20 PROCEDURE — 77066 DX MAMMO INCL CAD BI: CPT | Performed by: FAMILY MEDICINE

## 2019-11-25 ENCOUNTER — OFFICE VISIT (OUTPATIENT)
Dept: INTEGRATIVE MEDICINE | Facility: CLINIC | Age: 44
End: 2019-11-25
Payer: MEDICAID

## 2019-11-25 ENCOUNTER — LAB ENCOUNTER (OUTPATIENT)
Dept: LAB | Facility: REFERENCE LAB | Age: 44
End: 2019-11-25
Attending: PHYSICIAN ASSISTANT
Payer: MEDICAID

## 2019-11-25 VITALS
HEIGHT: 56.75 IN | TEMPERATURE: 97 F | OXYGEN SATURATION: 97 % | SYSTOLIC BLOOD PRESSURE: 110 MMHG | DIASTOLIC BLOOD PRESSURE: 60 MMHG | HEART RATE: 77 BPM | BODY MASS INDEX: 16.45 KG/M2 | WEIGHT: 75.19 LBS

## 2019-11-25 DIAGNOSIS — E03.9 HYPOTHYROIDISM (ACQUIRED): ICD-10-CM

## 2019-11-25 DIAGNOSIS — R53.82 CHRONIC FATIGUE: ICD-10-CM

## 2019-11-25 DIAGNOSIS — E04.1 THYROID NODULE: ICD-10-CM

## 2019-11-25 DIAGNOSIS — E03.9 HYPOTHYROIDISM (ACQUIRED): Primary | ICD-10-CM

## 2019-11-25 DIAGNOSIS — F41.9 ANXIETY: ICD-10-CM

## 2019-11-25 DIAGNOSIS — R63.4 WEIGHT LOSS: ICD-10-CM

## 2019-11-25 PROCEDURE — 82627 DEHYDROEPIANDROSTERONE: CPT

## 2019-11-25 PROCEDURE — 84443 ASSAY THYROID STIM HORMONE: CPT

## 2019-11-25 PROCEDURE — 80053 COMPREHEN METABOLIC PANEL: CPT

## 2019-11-25 PROCEDURE — 36415 COLL VENOUS BLD VENIPUNCTURE: CPT

## 2019-11-25 PROCEDURE — 86376 MICROSOMAL ANTIBODY EACH: CPT

## 2019-11-25 PROCEDURE — 85025 COMPLETE CBC W/AUTO DIFF WBC: CPT

## 2019-11-25 PROCEDURE — 82746 ASSAY OF FOLIC ACID SERUM: CPT

## 2019-11-25 PROCEDURE — 84140 ASSAY OF PREGNENOLONE: CPT

## 2019-11-25 PROCEDURE — 83036 HEMOGLOBIN GLYCOSYLATED A1C: CPT

## 2019-11-25 PROCEDURE — 82607 VITAMIN B-12: CPT

## 2019-11-25 PROCEDURE — 84481 FREE ASSAY (FT-3): CPT

## 2019-11-25 PROCEDURE — 84439 ASSAY OF FREE THYROXINE: CPT

## 2019-11-25 PROCEDURE — 82306 VITAMIN D 25 HYDROXY: CPT

## 2019-11-25 PROCEDURE — 99214 OFFICE O/P EST MOD 30 MIN: CPT | Performed by: PHYSICIAN ASSISTANT

## 2019-11-25 PROCEDURE — 86800 THYROGLOBULIN ANTIBODY: CPT

## 2019-11-25 PROCEDURE — 82728 ASSAY OF FERRITIN: CPT

## 2019-11-25 NOTE — PROGRESS NOTES
Aspen Licea is a 40year old female.     Patient presents with:  New Patient: referred by thyroid  , over active thyroid   Weight Problem: weight lost ,  Anxiety: w depresssion   Hair/Scalp Problem: hair loss       HPI:   Has been very fatigued for 4 Medications   Medication Sig Dispense Refill   • Levothyroxine Sodium 88 MCG Oral Tab Take 1 tablet by mouth before breakfast (Patient taking differently: 75 mcg.  Take 1 tablet by mouth before breakfast ) 30 tablet 2   • FLUoxetine HCl 40 MG Oral Cap Take abused: Not on file        Physically abused: Not on file        Forced sexual activity: Not on file    Other Topics      Concerns:         Service: Not Asked        Blood Transfusions: Not Asked        Caffeine Concern: No          1 to 2 in the m Hypothyroidism (acquired)  - CBC WITH DIFFERENTIAL WITH PLATELET; Future  - COMP METABOLIC PANEL (14); Future  - DEHYDROEPIANDROSTERONE SULFATE; Future  - PREGNENOLONE BY MS/MS, SERUM; Future  - FERRITIN; Future  - FOLIC ACID SERUM(FOLATE);  Future  - HEMOG 25-HYDROXY; Future  - ASSAY, THYROID STIM HORMONE; Future  - FREE T3 (TRIIODOTHYRONINE); Future  - FREE T4 (FREE THYROXINE); Future  - THYROID PEROXIDASE (TPO) AB; Future  - VITAMIN B12; Future  - THYROID ANTITHYROGLOBULIN AB; Future    5.  Anxiety  - CBC W

## 2019-11-29 ENCOUNTER — TELEPHONE (OUTPATIENT)
Dept: ENDOCRINOLOGY CLINIC | Facility: CLINIC | Age: 44
End: 2019-11-29

## 2019-11-29 ENCOUNTER — TELEPHONE (OUTPATIENT)
Dept: INTEGRATIVE MEDICINE | Facility: CLINIC | Age: 44
End: 2019-11-29

## 2019-11-29 NOTE — TELEPHONE ENCOUNTER
Patient was wondering if [facility] forwarded lab results to Dr Pillo Florian office. Requesting to discuss with Dr Tee eMrcado. Please call - aware Dr Tee Mercado is not in office. Thank you.

## 2019-11-29 NOTE — PROGRESS NOTES
Anatoly Strong,    Your labs have returned. Your vitamin D is low. Ideal levels are closer to 60. I recommend the following:    Vitamin D is an important backbone for many hormones and neurotransmitters. It is important for energy and mood.    Some good sources: in thyroid hormone production. There still is an option to change medications as we discussed in your visit to see if this gives your better relief of your symptoms. Please let me know your thoughts.      Yumiko Weller PA-C

## 2019-11-29 NOTE — TELEPHONE ENCOUNTER
Patient would like to speak to Fariba Kellre she is not understanding her diagnosis of Hashimotos disease. She is asking for a phone call back from Fariba Keller. RN reviewed her labs and gave her recommendations but she is still asking to speak with Fariba Keller directly.

## 2019-12-02 NOTE — TELEPHONE ENCOUNTER
Thyroid hormone levels are normal, hence thyroid doses seems to be sufficient  Thyroid AB is elevated, but that just indicates that she has autoimmune thyroid disease.    The extent of Ab elevation does not matter  Thanks

## 2019-12-02 NOTE — TELEPHONE ENCOUNTER
Patient contacted the clinic requesting follow-up from [facility] regarding her previous call.  Thank you

## 2019-12-03 ENCOUNTER — TELEPHONE (OUTPATIENT)
Dept: INTEGRATIVE MEDICINE | Facility: CLINIC | Age: 44
End: 2019-12-03

## 2019-12-03 PROBLEM — Z86.39 HISTORY OF HASHIMOTO THYROIDITIS: Status: ACTIVE | Noted: 2019-12-03

## 2019-12-03 PROBLEM — F41.0 PANIC DISORDER: Status: ACTIVE | Noted: 2019-12-03

## 2019-12-03 PROBLEM — R63.4 WEIGHT LOSS, ABNORMAL: Status: ACTIVE | Noted: 2019-12-03

## 2019-12-03 RX ORDER — LEVOTHYROXINE SODIUM 0.07 MG/1
75 TABLET ORAL
Qty: 30 TABLET | Refills: 2 | Status: SHIPPED | OUTPATIENT
Start: 2019-12-03 | End: 2019-12-19

## 2019-12-03 NOTE — TELEPHONE ENCOUNTER
Called patient back in regards to questions about lab results. Patient has questions regarding Hashimoto's. I explained to patient that the thyroid antibodies were elevated which indicates Hashimoto's which as an autoimmune disease.  I explained to patient

## 2019-12-03 NOTE — PROGRESS NOTES
CC:  Follow - Up (Pt presents to clinic for routine f/up.)      Hx of CC:  F/UP ON PANIC DISORDER, DEPRESSION, INSOMNIA, WEIGHT LOSS.   CURRENTLY ON FLUOXETINE 40 MG QD, CLONAZEPAM TID, QUETIAPINE 1/2 TAB AT HS.      CRYING LESS NOW BUT STILL FEELS ANXIOUS Refill: 2    3. Psychophysiological insomnia    - clonazePAM 1 MG Oral Tab; Take 1 tablet (1 mg total) by mouth 3 (three) times daily as needed for Anxiety. Dispense: 90 tablet; Refill: 0    4.  Familial hyperlipidemia, high LDL  DISCUSSED, GENETIC, NEEDS

## 2019-12-03 NOTE — TELEPHONE ENCOUNTER
I do not typically prescribe T3 since it can worsen anxiety  I understand her symptoms, but given that her thyroid labs are normal, I do not think these are related to her thyroid  I suggest talking to her PCP to see how these symptoms can be addressed.

## 2019-12-03 NOTE — TELEPHONE ENCOUNTER
Spoke with the patient. She has been experiencing weight loss, depression, anxiety, hair loss burning in her hands and feet. She does not feel well. She feels it is related to her thyroid. She was researching Hashimoto's.  Feels she may benefit from T3

## 2019-12-03 NOTE — TELEPHONE ENCOUNTER
Tatyana Gonzalez says she will follow up with PCP - has appointment today. Her LT4 dose was decreased 88 --> 75 on 11/19 and she will be repeating labs in another 4 weeks.

## 2019-12-03 NOTE — TELEPHONE ENCOUNTER
OK noted, will call the pt and inform her of your advice below. Before we call, please advise on alernative T4 replacement. Patient asking about armour or other natural options derived from animals.

## 2019-12-19 RX ORDER — LEVOTHYROXINE SODIUM 0.07 MG/1
75 TABLET ORAL
Qty: 30 TABLET | Refills: 0 | Status: SHIPPED | OUTPATIENT
Start: 2019-12-19 | End: 2020-09-14

## 2019-12-19 NOTE — TELEPHONE ENCOUNTER
LOV 7/9/19 with follow up 1/13/20    Refilled per protocol. Called patient to let her know - phone rang several times, no answer, no voicemail. Sent [facility] message.

## 2019-12-19 NOTE — TELEPHONE ENCOUNTER
Pt requesting to have medication refilled today. Pt states she is out of medication. Please call pt once script has been approved and sent to pharmacy.

## 2019-12-27 ENCOUNTER — TELEPHONE (OUTPATIENT)
Dept: INTEGRATIVE MEDICINE | Facility: CLINIC | Age: 44
End: 2019-12-27

## 2019-12-27 NOTE — TELEPHONE ENCOUNTER
Patient verbalizes understanding and agrees with plan. Given instructions on what supplements to take and when.

## 2019-12-29 DIAGNOSIS — F32.A ANXIETY AND DEPRESSION: ICD-10-CM

## 2019-12-29 DIAGNOSIS — F41.0 PANIC DISORDER: ICD-10-CM

## 2019-12-29 DIAGNOSIS — F51.04 PSYCHOPHYSIOLOGICAL INSOMNIA: ICD-10-CM

## 2019-12-29 DIAGNOSIS — F41.9 ANXIETY AND DEPRESSION: ICD-10-CM

## 2019-12-30 ENCOUNTER — TELEPHONE (OUTPATIENT)
Dept: INTERNAL MEDICINE CLINIC | Facility: CLINIC | Age: 44
End: 2019-12-30

## 2019-12-30 RX ORDER — CLONAZEPAM 1 MG/1
TABLET ORAL
Qty: 90 TABLET | Refills: 0 | Status: SHIPPED | OUTPATIENT
Start: 2019-12-30 | End: 2020-01-27

## 2019-12-30 NOTE — TELEPHONE ENCOUNTER
Patient called, as she lost her medication. She's looked for the last 2 days and can't find her medication. The missing medication is Clonazepam    Can Dr. Lydia Marie refill partial medication until she comes into see him for her next visit?     Asking this viet

## 2019-12-30 NOTE — TELEPHONE ENCOUNTER
Dr. Vivian Plasencia received rx request for Clonazepam yesterday. Approved and faxed to [facility] this AM.  Called and informed pt of this and pt understood and will  rx.

## 2020-01-27 DIAGNOSIS — F51.04 PSYCHOPHYSIOLOGICAL INSOMNIA: ICD-10-CM

## 2020-01-27 DIAGNOSIS — F32.A ANXIETY AND DEPRESSION: ICD-10-CM

## 2020-01-27 DIAGNOSIS — F41.0 PANIC DISORDER: ICD-10-CM

## 2020-01-27 DIAGNOSIS — F41.9 ANXIETY AND DEPRESSION: ICD-10-CM

## 2020-01-27 RX ORDER — CLONAZEPAM 1 MG/1
TABLET ORAL
Qty: 90 TABLET | Refills: 0 | Status: SHIPPED | OUTPATIENT
Start: 2020-01-27 | End: 2020-03-02

## 2020-01-27 NOTE — TELEPHONE ENCOUNTER
Patient has an appointment scheduled for Tuesday, 2/11 at 1:40.     Can she please get a partial refill for Clonazepam.

## 2020-01-28 RX ORDER — CLONAZEPAM 1 MG/1
TABLET ORAL
Qty: 45 TABLET | Refills: 0 | OUTPATIENT
Start: 2020-01-28

## 2020-02-28 ENCOUNTER — TELEPHONE (OUTPATIENT)
Dept: INTERNAL MEDICINE CLINIC | Facility: CLINIC | Age: 45
End: 2020-02-28

## 2020-02-28 NOTE — TELEPHONE ENCOUNTER
Notified that PCP unable to prescribe anything until he sees her. Patient understands and will come to her Monday afternoon appointment.

## 2020-02-28 NOTE — TELEPHONE ENCOUNTER
NOT feeling well. Taking her medications. Yet, Cried all day. This is making her even more anxious. Feels weak. NO suicidal ideation. Klonapin:  1mg   OUT of this med. Fluoxetine 40mg  Quetiapine 25mg    Does something need to be increased?     Please

## 2020-03-02 PROBLEM — F17.210 CIGARETTE SMOKER: Status: ACTIVE | Noted: 2020-03-02

## 2020-03-02 NOTE — PROGRESS NOTES
CC:  Follow - Up (Pt presents to clinic for routine f/up.)      Hx of CC:  F/UP ON ANXIETY/DEPRESSION/APPETITE/THYROID AND LIPIDS. OVERALL ABOUT 50% BETTER WITH MOOD AND ANXIETY BUT STILL GETTING EPISODIC PANIC ATTACKS AND SADNESS.   DID NOT HAVE INSURANCE THREE TIMES DAILY AS NEEDED FOR ANXIETY  Dispense: 90 tablet; Refill: 2  - QUEtiapine Fumarate 25 MG Oral Tab; Take 1 tablet (25 mg total) by mouth nightly. Dispense: 30 tablet; Refill: 5    3.  Panic disorder  SEE ABOVE  - clonazePAM 1 MG Oral Tab; TAKE O change the behavior.    Assist: We used behavior change techniques (self-help and/or counseling), to aid Ligia in achieving agreed-upon goals by acquiring the skills, confidence, and social/environmental supports for behavior change, supplemented with adju

## 2020-05-02 NOTE — PROGRESS NOTES
Virtual Telephone Check-In    Rivera Cee verbally consents to a Virtual/Telephone Check-In visit on 05/02/20. Patient understands and accepts financial responsibility for any deductible, co-insurance and/or co-pays associated with this service.

## 2020-05-28 RX ORDER — CLONAZEPAM 1 MG/1
TABLET ORAL
Qty: 90 TABLET | Refills: 1 | Status: SHIPPED | OUTPATIENT
Start: 2020-05-28 | End: 2020-08-18

## 2020-06-01 ENCOUNTER — TELEPHONE (OUTPATIENT)
Dept: INTERNAL MEDICINE CLINIC | Facility: CLINIC | Age: 45
End: 2020-06-01

## 2020-06-01 DIAGNOSIS — Z20.822 SUSPECTED COVID-19 VIRUS INFECTION: Primary | ICD-10-CM

## 2020-06-01 NOTE — TELEPHONE ENCOUNTER
Started Last Night:    Fever 100.8 after tylenol. Bad HA  Weak  Hot/Chills  Sore throat  Aches    Not near anyone. Around elderly with pneumonia and toddlers.

## 2020-06-02 ENCOUNTER — TELEPHONE (OUTPATIENT)
Dept: INTERNAL MEDICINE CLINIC | Facility: CLINIC | Age: 45
End: 2020-06-02

## 2020-06-02 ENCOUNTER — LAB ENCOUNTER (OUTPATIENT)
Dept: LAB | Facility: HOSPITAL | Age: 45
End: 2020-06-02
Attending: FAMILY MEDICINE
Payer: MEDICAID

## 2020-06-02 DIAGNOSIS — Z20.822 SUSPECTED COVID-19 VIRUS INFECTION: ICD-10-CM

## 2020-06-03 ENCOUNTER — TELEPHONE (OUTPATIENT)
Dept: INTERNAL MEDICINE CLINIC | Facility: CLINIC | Age: 45
End: 2020-06-03

## 2020-07-27 ENCOUNTER — TELEPHONE (OUTPATIENT)
Dept: INTERNAL MEDICINE CLINIC | Facility: CLINIC | Age: 45
End: 2020-07-27

## 2020-07-27 NOTE — TELEPHONE ENCOUNTER
Need clonazepam.  At father's  in [place] and forgot to pack her clonazepam.  Pharmacy will accept verbal order as he can see the order that is still current. Pharmacist: Lalo Millan.   ~~~~~~~~~~~  Per Dr. Julia Perez can dispense 30

## 2020-07-29 RX ORDER — CLONAZEPAM 1 MG/1
TABLET ORAL
Qty: 90 TABLET | Refills: 0 | OUTPATIENT
Start: 2020-07-29

## 2020-08-17 ENCOUNTER — TELEPHONE (OUTPATIENT)
Dept: INTERNAL MEDICINE CLINIC | Facility: CLINIC | Age: 45
End: 2020-08-17

## 2020-08-17 NOTE — TELEPHONE ENCOUNTER
Patient needs a refill on Clonazepam 1 mg Oral tab medication. She said she is completely out of her medication now. When she was in [place] for a deathin her family, she thought she didn't bring the medication with her.   She found the medication and

## 2020-08-18 RX ORDER — CLONAZEPAM 1 MG/1
1 TABLET ORAL 2 TIMES DAILY PRN
Qty: 60 TABLET | Refills: 0 | Status: SHIPPED | OUTPATIENT
Start: 2020-08-18 | End: 2020-09-14

## 2020-08-18 NOTE — TELEPHONE ENCOUNTER
Pt scheduled Dr Trena Vance next available which was September 14th at 1pm. Pt wants to know if she can have enough medication to make it to this appointment?

## 2020-09-03 ENCOUNTER — TELEPHONE (OUTPATIENT)
Dept: INTERNAL MEDICINE CLINIC | Facility: CLINIC | Age: 45
End: 2020-09-03

## 2020-09-03 NOTE — TELEPHONE ENCOUNTER
Gave Ligia the following information:    LUNG NODULES NOT URGENT BUT NEEDING FOLLOW UP-->CAN BE INFECTIOUS, SCARRING, OR MALIGNANCY.    Should f/u with his PCP once he returns. It can wait until then.

## 2020-09-03 NOTE — TELEPHONE ENCOUNTER
Backgroung: Calling about her  who is NOT one of our patients. Wanted Dr. Amado Herrera to see him this week. Informed her that doctor is not accepting new patients. \"Can't I just bring him with me for a consult? \" Informed her that would not be possible,

## 2020-09-14 PROCEDURE — 84443 ASSAY THYROID STIM HORMONE: CPT | Performed by: FAMILY MEDICINE

## 2020-09-14 PROCEDURE — 80061 LIPID PANEL: CPT | Performed by: FAMILY MEDICINE

## 2020-09-14 PROCEDURE — 80053 COMPREHEN METABOLIC PANEL: CPT | Performed by: FAMILY MEDICINE

## 2020-09-14 NOTE — PROGRESS NOTES
Pt presented to clinic today for blood draw. Per physician able to draw orders. Orders  documented within chart. Pt tolerated lab draw well.  verified.   Orders drawn include: cmp, lipid, tsh  Site of draw: rt kyleigh Glaser CMA

## 2020-09-16 PROBLEM — E03.9 ACQUIRED HYPOTHYROIDISM: Status: ACTIVE | Noted: 2018-11-12

## 2020-09-16 NOTE — PROGRESS NOTES
CC:  Medication Follow-Up (pt presents for med F/U) and Refill Request (refill all meds)      Hx of CC:  F/UP ON ANXIETY/LIPIDS/THYROID. OVERALL MOOD BETTER, MILD RESIDUAL ANXIETY BUT NO MAJOR PANIC ATTACKS RECENTLY. IMPROVED APPETITE.   TAKING STATIN/LEV Sodium 75 MCG Oral Tab; Take 1 tablet (75 mcg total) by mouth before breakfast.  Dispense: 31 tablet; Refill: 5    5. Psychophysiological insomnia    - QUEtiapine Fumarate 25 MG Oral Tab; Take 1 tablet (25 mg total) by mouth nightly.   Dispense: 30 tablet;

## 2020-09-18 ENCOUNTER — TELEPHONE (OUTPATIENT)
Dept: INTERNAL MEDICINE CLINIC | Facility: CLINIC | Age: 45
End: 2020-09-18

## 2020-09-22 ENCOUNTER — TELEPHONE (OUTPATIENT)
Dept: INTERNAL MEDICINE CLINIC | Facility: CLINIC | Age: 45
End: 2020-09-22

## 2020-09-22 RX ORDER — BACLOFEN 10 MG/1
10 TABLET ORAL 2 TIMES DAILY PRN
Qty: 60 TABLET | Refills: 1 | Status: SHIPPED | OUTPATIENT
Start: 2020-09-22 | End: 2021-09-07

## 2020-11-02 ENCOUNTER — TELEPHONE (OUTPATIENT)
Dept: INTERNAL MEDICINE CLINIC | Facility: CLINIC | Age: 45
End: 2020-11-02

## 2020-11-02 NOTE — TELEPHONE ENCOUNTER
Afebrile; chills, body ache, runny nose, \"feels like a cold\". Then she mentioned her son called his doctor and he wrote an order for her to be tested. This doctor is ROSE MARIE.   Informed her we cannot help her with that testing and don't know where they get

## 2020-11-02 NOTE — TELEPHONE ENCOUNTER
Patient would like a call back from the Triage Nurse, as she hasn't felt good for the last couple of days. As far as she knows she hasn't been around anyone with Covid 19.

## 2020-11-10 ENCOUNTER — TELEPHONE (OUTPATIENT)
Dept: INTEGRATIVE MEDICINE | Facility: CLINIC | Age: 45
End: 2020-11-10

## 2020-12-23 ENCOUNTER — TELEMEDICINE (OUTPATIENT)
Dept: INTERNAL MEDICINE CLINIC | Facility: CLINIC | Age: 45
End: 2020-12-23
Payer: MEDICAID

## 2020-12-23 DIAGNOSIS — F51.04 PSYCHOPHYSIOLOGICAL INSOMNIA: ICD-10-CM

## 2020-12-23 DIAGNOSIS — F41.9 ANXIETY AND DEPRESSION: ICD-10-CM

## 2020-12-23 DIAGNOSIS — F41.0 PANIC DISORDER: ICD-10-CM

## 2020-12-23 DIAGNOSIS — M77.9 TENDONITIS: Primary | ICD-10-CM

## 2020-12-23 DIAGNOSIS — F32.A ANXIETY AND DEPRESSION: ICD-10-CM

## 2020-12-23 PROCEDURE — 99214 OFFICE O/P EST MOD 30 MIN: CPT | Performed by: FAMILY MEDICINE

## 2020-12-23 RX ORDER — MELOXICAM 15 MG/1
15 TABLET ORAL DAILY
Qty: 30 TABLET | Refills: 1 | Status: SHIPPED | OUTPATIENT
Start: 2020-12-23 | End: 2021-09-07

## 2020-12-23 RX ORDER — CLONAZEPAM 1 MG/1
1 TABLET ORAL 2 TIMES DAILY PRN
Qty: 60 TABLET | Refills: 1 | Status: SHIPPED | OUTPATIENT
Start: 2020-12-23 | End: 2021-02-21

## 2020-12-23 RX ORDER — PANTOPRAZOLE SODIUM 40 MG/1
40 TABLET, DELAYED RELEASE ORAL
Qty: 30 TABLET | Refills: 1 | Status: SHIPPED | OUTPATIENT
Start: 2020-12-23 | End: 2021-09-07

## 2020-12-23 NOTE — PROGRESS NOTES
Virtual VIDEO Check-In  Patient verbally consents to a Virtual/Telephone Check-In visit on 12/23/20  Patient understands and accepts financial responsibility for any deductible, co-insurance and/or co-pays associated with this service.     Duration of the s HCl 40 MG Oral Cap Take 1 capsule (40 mg total) by mouth daily. 30 capsule 5   • clonazePAM 1 MG Oral Tab Take 1 tablet (1 mg total) by mouth 2 (two) times daily as needed for Anxiety.  60 tablet 2   • ergocalciferol 1.25 MG (24865 UT) Oral Cap      • Speci once a day  F/u 2 mos   - clonazePAM 1 MG Oral Tab; Take 1 tablet (1 mg total) by mouth 2 (two) times daily as needed for Anxiety. Dispense: 60 tablet; Refill: 1      3.  Tendonitis  rec take PPI if taking meloxicam  Declined referral to f/u with ortho   -

## 2021-02-02 LAB — AMB EXT COVID-19 RESULT: DETECTED

## 2021-02-19 NOTE — TELEPHONE ENCOUNTER
Requesting refill on clonazepam and quetiapine. Will be out of both 2/26/21. Patient asking if you want to do a video call before appt on April 7th a 20 min visit? This was your first available spot.     Patient states she was diagnosed with Covid on 2

## 2021-02-21 NOTE — TELEPHONE ENCOUNTER
Please call pt and tell her I will refill short term only. She needs to see psychiatry for quietapine and clonazepam refills after this  . I will refill for 3 mos, but she needs to see psychiatry for this in future.     I will place a referral for moe argueta

## 2021-03-22 ENCOUNTER — TELEPHONE (OUTPATIENT)
Dept: INTERNAL MEDICINE CLINIC | Facility: CLINIC | Age: 46
End: 2021-03-22

## 2021-03-22 DIAGNOSIS — F41.9 ANXIETY AND DEPRESSION: ICD-10-CM

## 2021-03-22 DIAGNOSIS — F41.0 PANIC DISORDER: ICD-10-CM

## 2021-03-22 DIAGNOSIS — F32.A ANXIETY AND DEPRESSION: ICD-10-CM

## 2021-03-22 NOTE — TELEPHONE ENCOUNTER
Patient is in [place], due to a death in the family. She is out of Fluoxetine medication. If a refill could please be called to [facility] - [phone].

## 2021-03-23 RX ORDER — FLUOXETINE HYDROCHLORIDE 40 MG/1
40 CAPSULE ORAL DAILY
Qty: 30 CAPSULE | Refills: 5 | Status: SHIPPED | OUTPATIENT
Start: 2021-03-23 | End: 2021-09-07

## 2021-04-06 DIAGNOSIS — E03.9 ACQUIRED HYPOTHYROIDISM: ICD-10-CM

## 2021-04-06 RX ORDER — LEVOTHYROXINE SODIUM 0.07 MG/1
75 TABLET ORAL
Qty: 31 TABLET | Refills: 5 | Status: SHIPPED | OUTPATIENT
Start: 2021-04-06 | End: 2021-09-07

## 2021-08-04 ENCOUNTER — APPOINTMENT (OUTPATIENT)
Dept: CT IMAGING | Facility: HOSPITAL | Age: 46
End: 2021-08-04
Attending: EMERGENCY MEDICINE
Payer: MEDICAID

## 2021-08-04 PROCEDURE — 70496 CT ANGIOGRAPHY HEAD: CPT | Performed by: EMERGENCY MEDICINE

## 2021-08-04 PROCEDURE — 70498 CT ANGIOGRAPHY NECK: CPT | Performed by: EMERGENCY MEDICINE

## 2021-08-04 NOTE — ED QUICK NOTES
Patient out of department for additional testing, transported to CT per stretcher, pt is stable upon transport.

## 2021-08-04 NOTE — ED INITIAL ASSESSMENT (HPI)
Pt brought in by EMS for numbness to the left side of the face, left sided chest pain, palpitations, generalized weakness that started 1535 today. Pt is A/O x 4, breathing is unlabored.

## 2021-08-05 NOTE — ED PROVIDER NOTES
Patient Seen in: [facility] Emergency Department    History   Patient presents with:  Numbness Weakness      HPI    The patient presents to the ED complaining of generalized weakness, palpitations, left-sided chest discomfort and numbness and ting 1800]   /81   Pulse 62   Resp 18   Temp    Temp src    SpO2 97 %   O2 Device        All measures to prevent infection transmission during my interaction with the patient were taken.  The patient was already wearing a droplet mask on my arrival to the Procedure                               Abnormality         Status                                     ---------                               -----------         ------                                     CBC W/ DIFFERENTIAL[[id number]] 08/04/21 1945 08/04/21 2000   BP: 146/85 138/84 148/90 140/83   Pulse: 65 60 69 65   Resp: 13 22 24 26   SpO2: 98% 97% 97% 98%     *I personally reviewed and interpreted all ED vitals.     Pulse Ox: 98%, Room air, Normal     Monitor Interpretation:   norm

## 2021-09-04 DIAGNOSIS — F41.9 ANXIETY AND DEPRESSION: ICD-10-CM

## 2021-09-04 DIAGNOSIS — F41.0 PANIC DISORDER: ICD-10-CM

## 2021-09-04 DIAGNOSIS — F32.A ANXIETY AND DEPRESSION: ICD-10-CM

## 2021-09-04 DIAGNOSIS — E03.9 ACQUIRED HYPOTHYROIDISM: ICD-10-CM

## 2021-09-07 ENCOUNTER — TELEMEDICINE (OUTPATIENT)
Dept: ENDOCRINOLOGY CLINIC | Facility: CLINIC | Age: 46
End: 2021-09-07
Payer: MEDICAID

## 2021-09-07 ENCOUNTER — TELEPHONE (OUTPATIENT)
Dept: ENDOCRINOLOGY CLINIC | Facility: CLINIC | Age: 46
End: 2021-09-07

## 2021-09-07 DIAGNOSIS — E55.9 VITAMIN D DEFICIENCY: ICD-10-CM

## 2021-09-07 DIAGNOSIS — Z86.39 H/O HYPERTHYROIDISM: ICD-10-CM

## 2021-09-07 DIAGNOSIS — E03.9 ACQUIRED HYPOTHYROIDISM: ICD-10-CM

## 2021-09-07 DIAGNOSIS — E03.9 HYPOTHYROIDISM, UNSPECIFIED TYPE: Primary | ICD-10-CM

## 2021-09-07 PROCEDURE — 99213 OFFICE O/P EST LOW 20 MIN: CPT | Performed by: INTERNAL MEDICINE

## 2021-09-07 RX ORDER — FLUOXETINE HYDROCHLORIDE 40 MG/1
40 CAPSULE ORAL DAILY
Qty: 90 CAPSULE | Refills: 0 | Status: SHIPPED | OUTPATIENT
Start: 2021-09-07 | End: 2021-09-08

## 2021-09-07 RX ORDER — LEVOTHYROXINE SODIUM 0.07 MG/1
TABLET ORAL
Qty: 90 TABLET | Refills: 0 | Status: SHIPPED | OUTPATIENT
Start: 2021-09-07 | End: 2021-09-07

## 2021-09-07 RX ORDER — LEVOTHYROXINE SODIUM 0.07 MG/1
TABLET ORAL
Qty: 31 TABLET | Refills: 0 | Status: ON HOLD | OUTPATIENT
Start: 2021-09-07 | End: 2022-02-03

## 2021-09-07 NOTE — PROGRESS NOTES
Telehealth outside of [address] Verbal Consent   I conducted a telehealth visit with Teresa Mohamud today, 09/07/21, which was completed using two-way, real-time interactive audio and video communication.  This has been done in [place] to [employer] day a week    She states that was doing well on this regimen uptill a few weeks ago when she started feeling bloated and puffy  Also reports fatigue    CURRENT MEDICATION:    Current Outpatient Medications   Medication Sig Dispense Refill   • FLUoxetine HC no excessive bruising  Neuro: motor grossly intact, moving all extremities without difficulty  Psychiatric:  oriented to time, self, and place  Extremities: no obvious extremity swelling, no lesions        DATA:     Pertinent data reviewed    ASSESSMENT A ordered as detailed in the plan of care above.”

## 2021-09-07 NOTE — TELEPHONE ENCOUNTER
Dr. Altamirano Flower to patient and states she has not seen endo for about a year. She went to [place] team because she didn't know you also take care of Hashimoto's.   She c/o fatigue, swelling on the face, gaining weight and knows this is her th

## 2021-09-08 ENCOUNTER — LAB ENCOUNTER (OUTPATIENT)
Dept: LAB | Facility: HOSPITAL | Age: 46
End: 2021-09-08
Attending: INTERNAL MEDICINE
Payer: MEDICAID

## 2021-09-08 ENCOUNTER — OFFICE VISIT (OUTPATIENT)
Dept: INTERNAL MEDICINE CLINIC | Facility: CLINIC | Age: 46
End: 2021-09-08
Payer: MEDICAID

## 2021-09-08 VITALS
SYSTOLIC BLOOD PRESSURE: 122 MMHG | WEIGHT: 104.19 LBS | BODY MASS INDEX: 22.79 KG/M2 | DIASTOLIC BLOOD PRESSURE: 74 MMHG | HEIGHT: 56.5 IN | HEART RATE: 66 BPM

## 2021-09-08 DIAGNOSIS — E78.00 HYPERCHOLESTEROLEMIA: ICD-10-CM

## 2021-09-08 DIAGNOSIS — E55.9 VITAMIN D DEFICIENCY: ICD-10-CM

## 2021-09-08 DIAGNOSIS — F41.0 PANIC DISORDER: ICD-10-CM

## 2021-09-08 DIAGNOSIS — F41.9 ANXIETY AND DEPRESSION: ICD-10-CM

## 2021-09-08 DIAGNOSIS — F32.A ANXIETY AND DEPRESSION: ICD-10-CM

## 2021-09-08 DIAGNOSIS — F41.8 DEPRESSION WITH ANXIETY: ICD-10-CM

## 2021-09-08 DIAGNOSIS — E03.8 HYPOTHYROIDISM DUE TO HASHIMOTO'S THYROIDITIS: Primary | ICD-10-CM

## 2021-09-08 DIAGNOSIS — E06.3 HYPOTHYROIDISM DUE TO HASHIMOTO'S THYROIDITIS: Primary | ICD-10-CM

## 2021-09-08 DIAGNOSIS — E03.9 HYPOTHYROIDISM, UNSPECIFIED TYPE: ICD-10-CM

## 2021-09-08 LAB
ALBUMIN SERPL-MCNC: 4.2 G/DL (ref 3.4–5)
ALBUMIN/GLOB SERPL: 1.2 {RATIO} (ref 1–2)
ALP LIVER SERPL-CCNC: 58 U/L
ALT SERPL-CCNC: 17 U/L
ANION GAP SERPL CALC-SCNC: 5 MMOL/L (ref 0–18)
AST SERPL-CCNC: 16 U/L (ref 15–37)
BILIRUB SERPL-MCNC: 0.4 MG/DL (ref 0.1–2)
BUN BLD-MCNC: 13 MG/DL (ref 7–18)
BUN/CREAT SERPL: 21.7 (ref 10–20)
CALCIUM BLD-MCNC: 9.6 MG/DL (ref 8.5–10.1)
CHLORIDE SERPL-SCNC: 105 MMOL/L (ref 98–112)
CHOLEST SMN-MCNC: 263 MG/DL (ref ?–200)
CO2 SERPL-SCNC: 25 MMOL/L (ref 21–32)
CREAT BLD-MCNC: 0.6 MG/DL
DEPRECATED RDW RBC AUTO: 41.7 FL (ref 35.1–46.3)
ERYTHROCYTE [DISTWIDTH] IN BLOOD BY AUTOMATED COUNT: 12.8 % (ref 11–15)
GLOBULIN PLAS-MCNC: 3.5 G/DL (ref 2.8–4.4)
GLUCOSE BLD-MCNC: 87 MG/DL (ref 70–99)
HCT VFR BLD AUTO: 41.9 %
HDLC SERPL-MCNC: 60 MG/DL (ref 40–59)
HGB BLD-MCNC: 14.4 G/DL
LDLC SERPL CALC-MCNC: 175 MG/DL (ref ?–100)
M PROTEIN MFR SERPL ELPH: 7.7 G/DL (ref 6.4–8.2)
MCH RBC QN AUTO: 30.3 PG (ref 26–34)
MCHC RBC AUTO-ENTMCNC: 34.4 G/DL (ref 31–37)
MCV RBC AUTO: 88 FL
NONHDLC SERPL-MCNC: 203 MG/DL (ref ?–130)
OSMOLALITY SERPL CALC.SUM OF ELEC: 279 MOSM/KG (ref 275–295)
PATIENT FASTING Y/N/NP: YES
PATIENT FASTING Y/N/NP: YES
PLATELET # BLD AUTO: 304 10(3)UL (ref 150–450)
POTASSIUM SERPL-SCNC: 4.7 MMOL/L (ref 3.5–5.1)
RBC # BLD AUTO: 4.76 X10(6)UL
SODIUM SERPL-SCNC: 135 MMOL/L (ref 136–145)
T4 FREE SERPL-MCNC: 1.1 NG/DL (ref 0.8–1.7)
THYROPEROXIDASE AB SERPL-ACNC: 1047 U/ML (ref ?–60)
TRIGL SERPL-MCNC: 155 MG/DL (ref 30–149)
TSI SER-ACNC: 3.96 MIU/ML (ref 0.36–3.74)
VIT D+METAB SERPL-MCNC: 10.9 NG/ML (ref 30–100)
VLDLC SERPL CALC-MCNC: 31 MG/DL (ref 0–30)
WBC # BLD AUTO: 7 X10(3) UL (ref 4–11)

## 2021-09-08 PROCEDURE — 80061 LIPID PANEL: CPT

## 2021-09-08 PROCEDURE — 3008F BODY MASS INDEX DOCD: CPT | Performed by: INTERNAL MEDICINE

## 2021-09-08 PROCEDURE — 80053 COMPREHEN METABOLIC PANEL: CPT

## 2021-09-08 PROCEDURE — 3078F DIAST BP <80 MM HG: CPT | Performed by: INTERNAL MEDICINE

## 2021-09-08 PROCEDURE — 86376 MICROSOMAL ANTIBODY EACH: CPT

## 2021-09-08 PROCEDURE — 82306 VITAMIN D 25 HYDROXY: CPT

## 2021-09-08 PROCEDURE — 3074F SYST BP LT 130 MM HG: CPT | Performed by: INTERNAL MEDICINE

## 2021-09-08 PROCEDURE — 84439 ASSAY OF FREE THYROXINE: CPT

## 2021-09-08 PROCEDURE — 85027 COMPLETE CBC AUTOMATED: CPT

## 2021-09-08 PROCEDURE — 36415 COLL VENOUS BLD VENIPUNCTURE: CPT

## 2021-09-08 PROCEDURE — 84443 ASSAY THYROID STIM HORMONE: CPT

## 2021-09-08 PROCEDURE — 99204 OFFICE O/P NEW MOD 45 MIN: CPT | Performed by: INTERNAL MEDICINE

## 2021-09-08 RX ORDER — AMOXICILLIN AND CLAVULANATE POTASSIUM 500; 125 MG/1; MG/1
1 TABLET, FILM COATED ORAL 2 TIMES DAILY
Status: ON HOLD | COMMUNITY
Start: 2021-09-02 | End: 2022-02-03

## 2021-09-08 RX ORDER — FLUOXETINE HYDROCHLORIDE 40 MG/1
40 CAPSULE ORAL DAILY
Qty: 30 CAPSULE | Refills: 5 | Status: ON HOLD | OUTPATIENT
Start: 2021-09-08

## 2021-09-08 NOTE — H&P
Malini Wilson is a 39year old female who presents this afternoon for her initial visit with me to establish ongoing primary care. She was referred here by Endocrinology.   HPI:   She is interested in routine labs, and also requests a refill of fluoxe by mouth daily. 30 capsule 5   • LEVOTHYROXINE 75 MCG Oral Tab TAKE 1 TABLET (75 MCG TOTAL) BY MOUTH BEFORE BREAKFAST.  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     1. Hypothyroidism due to Hashimoto's thyroiditis  Await labs ordered

## 2021-09-08 NOTE — PATIENT INSTRUCTIONS
Await results of today's blood tests. Continue fluoxetine. Would recommend follow-up with Psychiatry if you wish to be prescribed clonazepam.  Follow-up visit in 6 months.

## 2021-09-09 ENCOUNTER — TELEPHONE (OUTPATIENT)
Dept: ENDOCRINOLOGY CLINIC | Facility: CLINIC | Age: 46
End: 2021-09-09

## 2021-09-09 DIAGNOSIS — E55.9 VITAMIN D DEFICIENCY: Primary | ICD-10-CM

## 2021-09-09 DIAGNOSIS — E03.9 HYPOTHYROIDISM, UNSPECIFIED TYPE: ICD-10-CM

## 2021-09-09 RX ORDER — LEVOTHYROXINE SODIUM 88 UG/1
TABLET ORAL
Qty: 135 TABLET | Refills: 0 | Status: SHIPPED | OUTPATIENT
Start: 2021-09-09 | End: 2022-01-10

## 2021-09-09 NOTE — TELEPHONE ENCOUNTER
rn called patient with message by dr Jone Mckeon below , verbalized understanding.    rx sent , labs ordered  Dr Jone Mckeon, patient said she saw dr Marcia Sharpe for first time yesterday and he refused to fill her klonazepam, referred her to psychiatrist which she ca

## 2021-09-09 NOTE — TELEPHONE ENCOUNTER
Dr. Francisco Dunne,     Please advise on results. Per LOV note she's taking levothyroxine 75 mcg 6 days a week, 1.5 tablets (112.50 mcg) 1 day a week  Thank you.      Component      Latest Ref Rng & Units 9/8/2021   T4,Free (Direct)      0.8 - 1.7 ng/dL 1.1

## 2021-09-09 NOTE — TELEPHONE ENCOUNTER
Can increase LT4 to   LT4 88 mcg : 1 tablet daily x 6 days a week  LT4 88 mc.5 tabs daily x 1 day a week    Renu D  Is low  Ergocalciferol 50,000 units q week x 12 weeks, followed by one capsule once a month    TSH, Free T4, 25 [place] vitamin D in 10 weeks

## 2021-09-10 NOTE — TELEPHONE ENCOUNTER
I am sorry but I am not very familiar with that  medication, hence won't be able too  She could please call the psychiatrist office to see if they can see her sooner  Thanks

## 2021-09-10 NOTE — TELEPHONE ENCOUNTER
Spoke to patient regarding klonopin, patient verbalized understanding to follow up with psychiatrist.

## 2021-09-24 ENCOUNTER — TELEPHONE (OUTPATIENT)
Dept: ENDOCRINOLOGY CLINIC | Facility: CLINIC | Age: 46
End: 2021-09-24

## 2021-09-24 NOTE — TELEPHONE ENCOUNTER
Dr. Harika Dickens to patient's daughter - she states that since levothyroxine dose was increased (9/9/21) patient has been confused and experiencing feelings of daniel vu  Daughter states no other changes to meds, no recent illness or infe

## 2021-09-24 NOTE — TELEPHONE ENCOUNTER
Steve Jay requesting to speak with RN - states patient is in a state of confusion over meds. Please call. Thank you.

## 2021-09-26 NOTE — TELEPHONE ENCOUNTER
Let us repeat labs  Can hold medication x one day   Unlikely related to dose increase, since the increase in minimal, but let us look at her labs and decide accordingly  Agree with contacting PCP also  Thanks

## 2021-09-27 ENCOUNTER — LAB ENCOUNTER (OUTPATIENT)
Dept: LAB | Facility: HOSPITAL | Age: 46
End: 2021-09-27
Attending: INTERNAL MEDICINE
Payer: MEDICAID

## 2021-09-27 DIAGNOSIS — E55.9 VITAMIN D DEFICIENCY: ICD-10-CM

## 2021-09-27 DIAGNOSIS — E03.9 HYPOTHYROIDISM, UNSPECIFIED TYPE: ICD-10-CM

## 2021-09-27 PROCEDURE — 36415 COLL VENOUS BLD VENIPUNCTURE: CPT

## 2021-09-27 PROCEDURE — 84439 ASSAY OF FREE THYROXINE: CPT

## 2021-09-27 PROCEDURE — 82306 VITAMIN D 25 HYDROXY: CPT

## 2021-09-27 PROCEDURE — 84443 ASSAY THYROID STIM HORMONE: CPT

## 2021-09-27 NOTE — TELEPHONE ENCOUNTER
MERLINE on daughter's phone regarding message below    Spoke to patient to relay message below - patient stated understanding to hold levothyroxine for one day - she stated she did not take on Saturday and felt a little better    Patient stated she will hold le

## 2021-09-28 ENCOUNTER — TELEPHONE (OUTPATIENT)
Dept: ENDOCRINOLOGY CLINIC | Facility: CLINIC | Age: 46
End: 2021-09-28

## 2021-09-28 DIAGNOSIS — E55.9 VITAMIN D DEFICIENCY: Primary | ICD-10-CM

## 2021-09-28 DIAGNOSIS — E03.9 HYPOTHYROIDISM, UNSPECIFIED TYPE: ICD-10-CM

## 2021-09-28 NOTE — TELEPHONE ENCOUNTER
Dr. Geri Feldman     Please advise. Patient verbalized understanding regarding plan below. Patient wants to call back and schedule follow up. Ok to still put labs through?

## 2021-09-28 NOTE — TELEPHONE ENCOUNTER
Vit d is getting better  C/w weekly dosing of ergocalciferol 50,000 units for 8 more weeks, followed by once a month    She is on LT4 as follows:     LT4 88 mcg : 1 tablet daily x 6 days a week  LT4 88 mc.5 tabs daily x 1 day a week    Thyroid labs sti

## 2021-12-04 ENCOUNTER — APPOINTMENT (OUTPATIENT)
Dept: CT IMAGING | Facility: HOSPITAL | Age: 46
End: 2021-12-04
Attending: EMERGENCY MEDICINE
Payer: MEDICAID

## 2021-12-04 ENCOUNTER — HOSPITAL ENCOUNTER (EMERGENCY)
Facility: HOSPITAL | Age: 46
Discharge: HOME | End: 2021-12-04
Attending: EMERGENCY MEDICINE
Payer: MEDICAID

## 2021-12-04 VITALS
HEIGHT: 57 IN | WEIGHT: 112 LBS | HEART RATE: 70 BPM | BODY MASS INDEX: 24.16 KG/M2 | OXYGEN SATURATION: 98 % | SYSTOLIC BLOOD PRESSURE: 130 MMHG | DIASTOLIC BLOOD PRESSURE: 68 MMHG | TEMPERATURE: 98 F | RESPIRATION RATE: 18 BRPM

## 2021-12-04 DIAGNOSIS — R10.33 ABDOMINAL PAIN, PERIUMBILICAL: Primary | ICD-10-CM

## 2021-12-04 PROCEDURE — 81003 URINALYSIS AUTO W/O SCOPE: CPT | Performed by: EMERGENCY MEDICINE

## 2021-12-04 PROCEDURE — 81025 URINE PREGNANCY TEST: CPT

## 2021-12-04 PROCEDURE — 96374 THER/PROPH/DIAG INJ IV PUSH: CPT

## 2021-12-04 PROCEDURE — 74177 CT ABD & PELVIS W/CONTRAST: CPT | Performed by: EMERGENCY MEDICINE

## 2021-12-04 PROCEDURE — 85025 COMPLETE CBC W/AUTO DIFF WBC: CPT | Performed by: EMERGENCY MEDICINE

## 2021-12-04 PROCEDURE — 96372 THER/PROPH/DIAG INJ SC/IM: CPT

## 2021-12-04 PROCEDURE — 99284 EMERGENCY DEPT VISIT MOD MDM: CPT

## 2021-12-04 PROCEDURE — 80048 BASIC METABOLIC PNL TOTAL CA: CPT | Performed by: EMERGENCY MEDICINE

## 2021-12-04 RX ORDER — ORPHENADRINE CITRATE 30 MG/ML
60 INJECTION INTRAMUSCULAR; INTRAVENOUS ONCE
Status: COMPLETED | OUTPATIENT
Start: 2021-12-04 | End: 2021-12-04

## 2021-12-04 RX ORDER — DICYCLOMINE HCL 20 MG
20 TABLET ORAL ONCE
Status: DISCONTINUED | OUTPATIENT
Start: 2021-12-04 | End: 2021-12-04

## 2021-12-04 RX ORDER — KETOROLAC TROMETHAMINE 15 MG/ML
15 INJECTION, SOLUTION INTRAMUSCULAR; INTRAVENOUS ONCE
Status: COMPLETED | OUTPATIENT
Start: 2021-12-04 | End: 2021-12-04

## 2021-12-04 RX ORDER — DICYCLOMINE HCL 20 MG
20 TABLET ORAL 4 TIMES DAILY PRN
Qty: 30 TABLET | Refills: 0 | Status: ON HOLD | OUTPATIENT
Start: 2021-12-04 | End: 2022-02-03

## 2021-12-04 NOTE — ED INITIAL ASSESSMENT (HPI)
Via EMS, L sided abd pain xdays, worsening and now radiating to back. Denies dysuria. +nausea, medicated with 4mg odt zofran by ems.

## 2021-12-05 NOTE — ED QUICK NOTES
patient resting on cart in position of comfort. Reports lweft lower abd pain that radiates to the back. reports difficulty sleeping due to pain.

## 2021-12-05 NOTE — ED PROVIDER NOTES
Patient Seen in: [facility] AND [facility] Emergency Department    History   Patient presents with:  Abdomen/Flank Pain      HPI    The patient presents to the ED complaining of severe pain to her left abdomen just next to her umbilicus.   Symptoms started severa Device None (Room air)       All measures to prevent infection transmission during my interaction with the patient were taken. The patient was already wearing a droplet mask on my arrival to the room.  Personal protective equipment including droplet mask, e ---------                               -----------         ------                                     CBC W/ DIFFERENTIAL[[id number]]                              Final result                                                 Please view results for t Height: 144.8 cm (4' 9\")       *I personally reviewed and interpreted all ED vitals.     Pulse Ox: 98%, Room air, Normal     Monitor Interpretation:   normal sinus rhythm    Differential Diagnosis/ Diagnostic Considerations: Nonspecific bowel pain, diver

## 2021-12-20 ENCOUNTER — OFFICE VISIT (OUTPATIENT)
Dept: INTERNAL MEDICINE CLINIC | Facility: CLINIC | Age: 46
End: 2021-12-20
Payer: MEDICAID

## 2021-12-20 ENCOUNTER — TELEPHONE (OUTPATIENT)
Dept: INTERNAL MEDICINE CLINIC | Facility: CLINIC | Age: 46
End: 2021-12-20

## 2021-12-20 VITALS
OXYGEN SATURATION: 99 % | HEIGHT: 57 IN | BODY MASS INDEX: 23.51 KG/M2 | SYSTOLIC BLOOD PRESSURE: 120 MMHG | HEART RATE: 73 BPM | WEIGHT: 109 LBS | DIASTOLIC BLOOD PRESSURE: 70 MMHG

## 2021-12-20 DIAGNOSIS — R10.13 ABDOMINAL PAIN, EPIGASTRIC: Primary | ICD-10-CM

## 2021-12-20 DIAGNOSIS — R14.0 ABDOMINAL BLOATING: ICD-10-CM

## 2021-12-20 PROCEDURE — 3078F DIAST BP <80 MM HG: CPT | Performed by: INTERNAL MEDICINE

## 2021-12-20 PROCEDURE — 3008F BODY MASS INDEX DOCD: CPT | Performed by: INTERNAL MEDICINE

## 2021-12-20 PROCEDURE — 99214 OFFICE O/P EST MOD 30 MIN: CPT | Performed by: INTERNAL MEDICINE

## 2021-12-20 PROCEDURE — 87427 SHIGA-LIKE TOXIN AG IA: CPT | Performed by: INTERNAL MEDICINE

## 2021-12-20 PROCEDURE — 87045 FECES CULTURE AEROBIC BACT: CPT | Performed by: INTERNAL MEDICINE

## 2021-12-20 PROCEDURE — 3074F SYST BP LT 130 MM HG: CPT | Performed by: INTERNAL MEDICINE

## 2021-12-20 PROCEDURE — 87046 STOOL CULTR AEROBIC BACT EA: CPT | Performed by: INTERNAL MEDICINE

## 2021-12-20 RX ORDER — DICYCLOMINE HYDROCHLORIDE 10 MG/1
10 CAPSULE ORAL
Qty: 30 CAPSULE | Refills: 0 | Status: ON HOLD | OUTPATIENT
Start: 2021-12-20

## 2021-12-20 NOTE — TELEPHONE ENCOUNTER
Appointment made with Dr. Demi Bose this afternoon at 4:20pm.  Will forward previously given data to her for background information prior to visit.

## 2021-12-20 NOTE — PROGRESS NOTES
Subjective:   Patient ID: Sixto Wong is a 55year old female. Abdominal Pain  Associated symptoms include nausea. Pertinent negatives include no vomiting.    Patient here for fu from an ED where she was seen and evaluated ith a ctscan of the abdomen Psychiatric:         Mood and Affect: Mood normal.         Assessment & Plan:   Abdominal pain, epigastric  (primary encounter diagnosis) Referral to GI Bentyl 10mg q 6 prn   Abdominal bloating Check stool culture    Orders Placed This Encounter      Sto

## 2021-12-20 NOTE — TELEPHONE ENCOUNTER
My rec is first to have visit with our office- please sched with a different provider  Then we may need to try and help her get into GI more quickly  Dr Logan Ross have openings this week

## 2021-12-20 NOTE — TELEPHONE ENCOUNTER
Called for an appointment. Stomach pain for a few weeks, abdomen bloated-goes from soft to hard. At night feels chills as if fever, but no temp.  Urgency to have BM 3-4 times daily; first BM may have some stool with a lot of mucous and the rest is only muc

## 2021-12-22 ENCOUNTER — OFFICE VISIT (OUTPATIENT)
Dept: GASTROENTEROLOGY | Facility: CLINIC | Age: 46
End: 2021-12-22
Payer: MEDICAID

## 2021-12-22 ENCOUNTER — TELEPHONE (OUTPATIENT)
Dept: GASTROENTEROLOGY | Facility: CLINIC | Age: 46
End: 2021-12-22

## 2021-12-22 VITALS
DIASTOLIC BLOOD PRESSURE: 90 MMHG | SYSTOLIC BLOOD PRESSURE: 160 MMHG | HEIGHT: 57 IN | WEIGHT: 111 LBS | BODY MASS INDEX: 23.95 KG/M2

## 2021-12-22 DIAGNOSIS — R14.0 BLOATING: Primary | ICD-10-CM

## 2021-12-22 DIAGNOSIS — R19.8 IRREGULAR BOWEL HABITS: Primary | ICD-10-CM

## 2021-12-22 DIAGNOSIS — R10.32 LLQ PAIN: ICD-10-CM

## 2021-12-22 PROCEDURE — 3008F BODY MASS INDEX DOCD: CPT | Performed by: INTERNAL MEDICINE

## 2021-12-22 PROCEDURE — 3080F DIAST BP >= 90 MM HG: CPT | Performed by: INTERNAL MEDICINE

## 2021-12-22 PROCEDURE — 99244 OFF/OP CNSLTJ NEW/EST MOD 40: CPT | Performed by: INTERNAL MEDICINE

## 2021-12-22 PROCEDURE — 3077F SYST BP >= 140 MM HG: CPT | Performed by: INTERNAL MEDICINE

## 2021-12-22 NOTE — PATIENT INSTRUCTIONS
# Left sided abdominal pain  # bloating  # epigastric pain  # constipation  # irregular stool and more loose  # hemorrhoids    Recommend:  1.  Use wet towelettes (Kleenix, Tucks, Master) to clean the anal area after bowel movements and then pat dry the are

## 2021-12-22 NOTE — TELEPHONE ENCOUNTER
Per Dr. Claudia Daugherty - patient only want to proceed with colonoscopy  Scheduled for:  Colonoscopy 60987  Provider Name:  Dr. Claudia Daugherty  Date:  1/26/2022  Location:  [address] [place] [address]  Sedation:  MAC  Time:  11:00 am, arrival 10:00 am  Prep:  Trilyte  Meds/Allergies Reconciled?:

## 2021-12-22 NOTE — H&P
[address] [facility] [address] Gastroenterology                                                                                                  Clinic History and Physical     Pa Laterality Date   •       x4      Family Hx:   Family History   Problem Relation Age of Onset   • Breast Cancer Mother 61   • Hypertension Mother    • Breast Cancer Maternal Grandmother         52'S   • Heart Disease Father         MI/PCI age 72 jaundice   ALLERGIC/IMMUNOLOGIC:  negative for hay fever  ENDOCRINE:  negative for cold intolerance and heat intolerance  MUSCULOSKELETAL:  negative for joint effusion/severe erythema  BEHAVIOR/PSYCH:  negative for psychotic behavior      PHYSICAL EXAM: 09/08/2021     12/04/2021    K 3.9 12/04/2021     12/04/2021    CO2 29.0 12/04/2021     Lab Results   Component Value Date    PTP 13.3 10/06/2016    INR 0.98 10/06/2016     .   ASSESSMENT/PLAN:     # Left sided abdominal pain  # bloating  # epig agreed to sign an informed consent and elected to proceed with the procedures with possible intervention [i.e. polypectomy, stent placement, etc.] as indicated.     ADDENDUM    Wants to hold off EGD  Will order stool Hpylori and blood work for celiac      O

## 2021-12-23 RX ORDER — POLYETHYLENE GLYCOL 3350, SODIUM CHLORIDE, SODIUM BICARBONATE, POTASSIUM CHLORIDE 420; 11.2; 5.72; 1.48 G/4L; G/4L; G/4L; G/4L
POWDER, FOR SOLUTION ORAL
Qty: 1 EACH | Refills: 0 | Status: ON HOLD | OUTPATIENT
Start: 2021-12-23 | End: 2022-02-03

## 2022-01-10 RX ORDER — LEVOTHYROXINE SODIUM 88 UG/1
TABLET ORAL
Qty: 135 TABLET | Refills: 0 | Status: ON HOLD | OUTPATIENT
Start: 2022-01-10

## 2022-01-21 ENCOUNTER — TELEPHONE (OUTPATIENT)
Dept: GASTROENTEROLOGY | Facility: CLINIC | Age: 47
End: 2022-01-21

## 2022-01-21 NOTE — TELEPHONE ENCOUNTER
Overdue reminder letter mailed out to patient.      Labs:   CELIAC DISEASE SCREEN REFLEX   H. PYLORI STOOL AG, EIA

## 2022-01-21 NOTE — TELEPHONE ENCOUNTER
Cancel :  Colonoscopy 23398  Provider Name:  Dr. Demond Mujica  Date:  1/26/2022  Location:  [address] [place] [address]  Sedation:  MAC  Time:   Prep:    Meds/Allergies Reconciled?:  Physician reviewed  Diagnosis with codes:  Irregular bowel habits R19.8; LLQ Pain R10.32  Was patient i

## 2022-01-24 ENCOUNTER — TELEPHONE (OUTPATIENT)
Dept: GASTROENTEROLOGY | Facility: CLINIC | Age: 47
End: 2022-01-24

## 2022-01-24 NOTE — TELEPHONE ENCOUNTER
Per Vita HORTON/RN--    Spoke with patient on 1/21 regarding procedure 1/26.  Patient states that she needs to cancel the procedure as her mother is currently in the hospital. Patient understands that she will need to call Dr. Sarah Service office in order to resche 60

## 2022-02-03 ENCOUNTER — APPOINTMENT (OUTPATIENT)
Dept: CT IMAGING | Facility: HOSPITAL | Age: 47
DRG: 445 | End: 2022-02-03
Attending: EMERGENCY MEDICINE
Payer: MEDICAID

## 2022-02-03 ENCOUNTER — HOSPITAL ENCOUNTER (INPATIENT)
Facility: HOSPITAL | Age: 47
LOS: 5 days | Discharge: HOME | DRG: 445 | End: 2022-02-08
Attending: EMERGENCY MEDICINE | Admitting: HOSPITALIST
Payer: MEDICAID

## 2022-02-03 ENCOUNTER — TELEPHONE (OUTPATIENT)
Dept: GASTROENTEROLOGY | Facility: CLINIC | Age: 47
End: 2022-02-03

## 2022-02-03 DIAGNOSIS — R10.9 ABDOMINAL PAIN, ACUTE: Primary | ICD-10-CM

## 2022-02-03 PROBLEM — K57.92 ACUTE DIVERTICULITIS: Status: ACTIVE | Noted: 2022-02-03

## 2022-02-03 LAB
ANION GAP SERPL CALC-SCNC: 7 MMOL/L (ref 0–18)
B-HCG UR QL: NEGATIVE
BASOPHILS # BLD AUTO: 0.04 X10(3) UL (ref 0–0.2)
BASOPHILS NFR BLD AUTO: 0.6 %
BILIRUB UR QL: NEGATIVE
BUN BLD-MCNC: 9 MG/DL (ref 7–18)
BUN/CREAT SERPL: 14.5 (ref 10–20)
CALCIUM BLD-MCNC: 9.7 MG/DL (ref 8.5–10.1)
CHLORIDE SERPL-SCNC: 106 MMOL/L (ref 98–112)
CLARITY UR: CLEAR
CO2 SERPL-SCNC: 27 MMOL/L (ref 21–32)
COLOR UR: YELLOW
CREAT BLD-MCNC: 0.62 MG/DL
DEPRECATED RDW RBC AUTO: 38.7 FL (ref 35.1–46.3)
EOSINOPHIL # BLD AUTO: 0.18 X10(3) UL (ref 0–0.7)
EOSINOPHIL NFR BLD AUTO: 2.6 %
ERYTHROCYTE [DISTWIDTH] IN BLOOD BY AUTOMATED COUNT: 12 % (ref 11–15)
GLUCOSE BLD-MCNC: 91 MG/DL (ref 70–99)
GLUCOSE UR-MCNC: NEGATIVE MG/DL
HCT VFR BLD AUTO: 40 %
HGB UR QL STRIP.AUTO: NEGATIVE
IMM GRANULOCYTES # BLD AUTO: 0.02 X10(3) UL (ref 0–1)
IMM GRANULOCYTES NFR BLD: 0.3 %
KETONES UR-MCNC: 20 MG/DL
LEUKOCYTE ESTERASE UR QL STRIP.AUTO: NEGATIVE
LYMPHOCYTES # BLD AUTO: 2.62 X10(3) UL (ref 1–4)
LYMPHOCYTES NFR BLD AUTO: 38 %
MCH RBC QN AUTO: 29.8 PG (ref 26–34)
MCHC RBC AUTO-ENTMCNC: 34.3 G/DL (ref 31–37)
MCV RBC AUTO: 87 FL
MONOCYTES # BLD AUTO: 0.51 X10(3) UL (ref 0.1–1)
MONOCYTES NFR BLD AUTO: 7.4 %
NEUTROPHILS # BLD AUTO: 3.52 X10 (3) UL (ref 1.5–7.7)
NEUTROPHILS # BLD AUTO: 3.52 X10(3) UL (ref 1.5–7.7)
NEUTROPHILS NFR BLD AUTO: 51.1 %
NITRITE UR QL STRIP.AUTO: NEGATIVE
OSMOLALITY SERPL CALC.SUM OF ELEC: 288 MOSM/KG (ref 275–295)
PH UR: 6 [PH] (ref 5–8)
PLATELET # BLD AUTO: 307 10(3)UL (ref 150–450)
POTASSIUM SERPL-SCNC: 4.4 MMOL/L (ref 3.5–5.1)
PROT UR-MCNC: NEGATIVE MG/DL
RBC # BLD AUTO: 4.6 X10(6)UL
SARS-COV-2 RNA RESP QL NAA+PROBE: NOT DETECTED
SODIUM SERPL-SCNC: 140 MMOL/L (ref 136–145)
SP GR UR STRIP: 1.01 (ref 1–1.03)
UROBILINOGEN UR STRIP-ACNC: <2
WBC # BLD AUTO: 6.9 X10(3) UL (ref 4–11)

## 2022-02-03 PROCEDURE — 99222 1ST HOSP IP/OBS MODERATE 55: CPT | Performed by: HOSPITALIST

## 2022-02-03 PROCEDURE — 74177 CT ABD & PELVIS W/CONTRAST: CPT | Performed by: EMERGENCY MEDICINE

## 2022-02-03 RX ORDER — DEXTROSE, SODIUM CHLORIDE, AND POTASSIUM CHLORIDE 5; .9; .15 G/100ML; G/100ML; G/100ML
INJECTION INTRAVENOUS CONTINUOUS
Status: DISCONTINUED | OUTPATIENT
Start: 2022-02-03 | End: 2022-02-05

## 2022-02-03 RX ORDER — ONDANSETRON 2 MG/ML
4 INJECTION INTRAMUSCULAR; INTRAVENOUS EVERY 6 HOURS PRN
Status: DISCONTINUED | OUTPATIENT
Start: 2022-02-03 | End: 2022-02-08

## 2022-02-03 RX ORDER — PROCHLORPERAZINE EDISYLATE 5 MG/ML
5 INJECTION INTRAMUSCULAR; INTRAVENOUS EVERY 8 HOURS PRN
Status: DISCONTINUED | OUTPATIENT
Start: 2022-02-03 | End: 2022-02-08

## 2022-02-03 RX ORDER — KETOROLAC TROMETHAMINE 30 MG/ML
30 INJECTION, SOLUTION INTRAMUSCULAR; INTRAVENOUS ONCE
Status: COMPLETED | OUTPATIENT
Start: 2022-02-03 | End: 2022-02-03

## 2022-02-03 RX ORDER — SODIUM CHLORIDE 9 MG/ML
INJECTION, SOLUTION INTRAVENOUS CONTINUOUS
Status: DISCONTINUED | OUTPATIENT
Start: 2022-02-03 | End: 2022-02-04

## 2022-02-03 RX ORDER — METOCLOPRAMIDE HYDROCHLORIDE 5 MG/ML
10 INJECTION INTRAMUSCULAR; INTRAVENOUS ONCE
Status: COMPLETED | OUTPATIENT
Start: 2022-02-03 | End: 2022-02-03

## 2022-02-03 RX ORDER — MORPHINE SULFATE 2 MG/ML
2 INJECTION, SOLUTION INTRAMUSCULAR; INTRAVENOUS EVERY 4 HOURS PRN
Status: DISCONTINUED | OUTPATIENT
Start: 2022-02-03 | End: 2022-02-08

## 2022-02-03 RX ORDER — MORPHINE SULFATE 2 MG/ML
1 INJECTION, SOLUTION INTRAMUSCULAR; INTRAVENOUS EVERY 4 HOURS PRN
Status: DISCONTINUED | OUTPATIENT
Start: 2022-02-03 | End: 2022-02-08

## 2022-02-03 RX ORDER — DIPHENHYDRAMINE HYDROCHLORIDE 50 MG/ML
25 INJECTION INTRAMUSCULAR; INTRAVENOUS ONCE
Status: COMPLETED | OUTPATIENT
Start: 2022-02-03 | End: 2022-02-03

## 2022-02-03 NOTE — TELEPHONE ENCOUNTER
OFFICE/CLINIC ON-CALL:    Pt/ called this evening. Patient known to Dr. Murtaza Robb. She is having worsening lower ab pain, vomiting x 3 days, possible coffee ground emesis. inability to have BM at all, only mucus. Unclear etiology, diverticulitis? SBO? Plan:  -er evaluation tonight, CT AP, labs, IV fluids  -if workup in ER negative for anything acute surgically, recommend admission to continue workup inpatient. May need EGD and/or colonoscopy. Notified ER triage nurse.

## 2022-02-04 LAB
ALBUMIN SERPL-MCNC: 3 G/DL (ref 3.4–5)
ALBUMIN/GLOB SERPL: 1.3 {RATIO} (ref 1–2)
ALP LIVER SERPL-CCNC: 43 U/L
ANION GAP SERPL CALC-SCNC: 3 MMOL/L (ref 0–18)
AST SERPL-CCNC: 8 U/L (ref 15–37)
BASOPHILS # BLD AUTO: 0.03 X10(3) UL (ref 0–0.2)
BASOPHILS NFR BLD AUTO: 0.6 %
BILIRUB SERPL-MCNC: 0.4 MG/DL (ref 0.1–2)
BUN BLD-MCNC: 7 MG/DL (ref 7–18)
BUN/CREAT SERPL: 11.9 (ref 10–20)
CALCIUM BLD-MCNC: 8.1 MG/DL (ref 8.5–10.1)
CHLORIDE SERPL-SCNC: 109 MMOL/L (ref 98–112)
CO2 SERPL-SCNC: 28 MMOL/L (ref 21–32)
CREAT BLD-MCNC: 0.59 MG/DL
DEPRECATED HBV CORE AB SER IA-ACNC: 31.7 NG/ML
DEPRECATED RDW RBC AUTO: 40.3 FL (ref 35.1–46.3)
EOSINOPHIL # BLD AUTO: 0.22 X10(3) UL (ref 0–0.7)
EOSINOPHIL NFR BLD AUTO: 4.4 %
ERYTHROCYTE [DISTWIDTH] IN BLOOD BY AUTOMATED COUNT: 12.3 % (ref 11–15)
GLOBULIN PLAS-MCNC: 2.4 G/DL (ref 2.8–4.4)
GLUCOSE BLD-MCNC: 112 MG/DL (ref 70–99)
HCT VFR BLD AUTO: 33.5 %
HCT VFR BLD AUTO: 34.2 %
HGB BLD-MCNC: 10.9 G/DL
HGB BLD-MCNC: 11.3 G/DL
IMM GRANULOCYTES # BLD AUTO: 0.01 X10(3) UL (ref 0–1)
IMM GRANULOCYTES NFR BLD: 0.2 %
IRON SATN MFR SERPL: 29 %
IRON SERPL-MCNC: 89 UG/DL
LYMPHOCYTES # BLD AUTO: 2.3 X10(3) UL (ref 1–4)
LYMPHOCYTES NFR BLD AUTO: 46.5 %
MAGNESIUM SERPL-MCNC: 1.9 MG/DL (ref 1.7–2.8)
MCH RBC QN AUTO: 29.6 PG (ref 26–34)
MCHC RBC AUTO-ENTMCNC: 33 G/DL (ref 31–37)
MCV RBC AUTO: 89.5 FL
MONOCYTES # BLD AUTO: 0.4 X10(3) UL (ref 0.1–1)
MONOCYTES NFR BLD AUTO: 8.1 %
NEUTROPHILS # BLD AUTO: 1.99 X10 (3) UL (ref 1.5–7.7)
NEUTROPHILS # BLD AUTO: 1.99 X10(3) UL (ref 1.5–7.7)
NEUTROPHILS NFR BLD AUTO: 40.2 %
OSMOLALITY SERPL CALC.SUM OF ELEC: 289 MOSM/KG (ref 275–295)
PLATELET # BLD AUTO: 259 10(3)UL (ref 150–450)
POTASSIUM SERPL-SCNC: 4.2 MMOL/L (ref 3.5–5.1)
PROT SERPL-MCNC: 5.4 G/DL (ref 6.4–8.2)
RBC # BLD AUTO: 3.82 X10(6)UL
SODIUM SERPL-SCNC: 140 MMOL/L (ref 136–145)
TIBC SERPL-MCNC: 302 UG/DL (ref 240–450)
TRANSFERRIN SERPL-MCNC: 203 MG/DL (ref 200–360)
TSI SER-ACNC: 0.61 MIU/ML (ref 0.36–3.74)
WBC # BLD AUTO: 5 X10(3) UL (ref 4–11)

## 2022-02-04 PROCEDURE — 99233 SBSQ HOSP IP/OBS HIGH 50: CPT | Performed by: HOSPITALIST

## 2022-02-04 PROCEDURE — 99222 1ST HOSP IP/OBS MODERATE 55: CPT | Performed by: PHYSICIAN ASSISTANT

## 2022-02-04 RX ORDER — LEVOTHYROXINE SODIUM 88 UG/1
88 TABLET ORAL
Status: DISCONTINUED | OUTPATIENT
Start: 2022-02-04 | End: 2022-02-08

## 2022-02-04 NOTE — PLAN OF CARE
No complaints of pain. Golytely started. Bed in lowest position and call light within reach. Plan for EGD/Colonoscopy tomorrow. Problem: Patient Centered Care  Goal: Patient preferences are identified and integrated in the patient's plan of care  Description: Interventions:  - What would you like us to know as we care for you?  From home with   - Provide timely, complete, and accurate information to patient/family  - Incorporate patient and family knowledge, values, beliefs, and cultural backgrounds into the planning and delivery of care  - Encourage patient/family to participate in care and decision-making at the level they choose  - Honor patient and family perspectives and choices  Outcome: Progressing     Problem: Patient/Family Goals  Goal: Patient/Family Long Term Goal  Description: Patient's Long Term Goal: to go back home    Interventions:  - monitor VS, labs, test results  - follow MD orders  - administer medications  - discharge planning   - See additional Care Plan goals for specific interventions  Outcome: Progressing  Goal: Patient/Family Short Term Goal  Description: Patient's Short Term Goal: no pain or nausea    Interventions:   - give prn medications  - monitor vitals  - frequent rounds  - See additional Care Plan goals for specific interventions  Outcome: Progressing     Problem: GASTROINTESTINAL - ADULT  Goal: Minimal or absence of nausea and vomiting  Description: INTERVENTIONS:  - Maintain adequate hydration with IV or PO as ordered and tolerated  - Nasogastric tube to low intermittent suction as ordered  - Evaluate effectiveness of ordered antiemetic medications  - Provide nonpharmacologic comfort measures as appropriate  - Advance diet as tolerated, if ordered  - Obtain nutritional consult as needed  - Evaluate fluid balance  Outcome: Progressing  Goal: Maintains or returns to baseline bowel function  Description: INTERVENTIONS:  - Assess bowel function  - Maintain adequate hydration with IV or PO as ordered and tolerated  - Evaluate effectiveness of GI medications  - Encourage mobilization and activity  - Obtain nutritional consult as needed  - Establish a toileting routine/schedule  - Consider collaborating with pharmacy to review patient's medication profile  Outcome: Progressing     Problem: SKIN/TISSUE INTEGRITY - ADULT  Goal: Skin integrity remains intact  Description: INTERVENTIONS  - Assess and document risk factors for pressure ulcer development  - Assess and document skin integrity  - Monitor for areas of redness and/or skin breakdown  - Initiate interventions, skin care algorithm/standards of care as needed  Outcome: Progressing     Problem: PAIN - ADULT  Goal: Verbalizes/displays adequate comfort level or patient's stated pain goal  Description: INTERVENTIONS:  - Encourage pt to monitor pain and request assistance  - Assess pain using appropriate pain scale  - Administer analgesics based on type and severity of pain and evaluate response  - Implement non-pharmacological measures as appropriate and evaluate response  - Consider cultural and social influences on pain and pain management  - Manage/alleviate anxiety  - Utilize distraction and/or relaxation techniques  - Monitor for opioid side effects  - Notify MD/LIP if interventions unsuccessful or patient reports new pain  - Anticipate increased pain with activity and pre-medicate as appropriate  Outcome: Progressing     Problem: SAFETY ADULT - FALL  Goal: Free from fall injury  Description: INTERVENTIONS:  - Assess pt frequently for physical needs  - Identify cognitive and physical deficits and behaviors that affect risk of falls.   - [place] fall precautions as indicated by assessment.  - Educate pt/family on patient safety including physical limitations  - Instruct pt to call for assistance with activity based on assessment  - Modify environment to reduce risk of injury  - Provide assistive devices as appropriate  - Consider OT/PT consult to assist with strengthening/mobility  - Encourage toileting schedule  Outcome: Progressing

## 2022-02-04 NOTE — H&P
[facility]    PATIENT'S NAME: Guero ROSE   ATTENDING PHYSICIAN: Herrera Prieto MD   PATIENT ACCOUNT#:   [id number]    LOCATION:  [address] [place] [address] RECORD #:   [id number]       YOB: 1975  ADMISSION DATE:       2022    HISTORY AND PHYSICAL EXAMINATION    DATE OF EXAMINATION:  2022    CHIEF COMPLAINT:  Abdominal pain. HISTORY OF PRESENT ILLNESS:  This is a very pleasant 68-year-old woman with a past medical history of depression, anxiety, previous , Hashimoto thyroiditis, status post radioactive iodine treatment and on supplemental thyroid medication, who states that for about the past month or so she has been complaining of abdominal pain and change in bowel habits. She says that she typically will have a bowel movement in the morning and then probably 4 or 5 more after that, but they are nothing but mucus. No further stool is passed. She describes the pain as diffuse, although it seems to start primarily in the left lower quadrant. She also has been quite nauseated, which has made it difficult for her to eat, but she has not vomited. She has chills on and off, but no fever. She has had rare blood streaks in the stool, typically after multiple bowel movements. She reports a total of 3 to 7 bowel movements per day. She denies any recent use of antibiotics. No travel history. No change in diet or medications and no sick contacts. She had an EGD probably 5 or 6 years ago. She was evaluated by Dr. Andrew Farrar in December. At that time, she was given recommendations for symptomatic treatment for hemorrhoids. Stool studies were sent. Shigatoxin was negative. Stool cultures were negative. Rapid COVID testing was negative. The patient had called because of her persistent abdominal pain and Dr. Kishore Dawson recommended evaluation in the emergency room with admission for expedited EGD and colonoscopy.   The patient had a CT scan of her abdomen and pelvis, which revealed a left adnexal cyst.  No other intra-abdominal process was identified. There was nonspecific gaseous distention of the rectum and rectosigmoid colon. The patient did state she had taken an enema prior to coming to the emergency room. There also appeared to be some tethering of the anterior uterine margin against the abdominopelvic wall associated with a Pfannenstiel incision, was likely felt to be secondary to underlying adhesions. Cholelithiasis was seen without CT evidence of cholecystitis. The patient was admitted and prep was ordered in the emergency room. PAST MEDICAL HISTORY:  Significant for depression and anxiety; hyperlipidemia; history of syncope secondary to vertebral dissection/aneurysm; less than 50% carotid artery stenosis bilaterally in 2018; Hashimoto's disease, status post radioactive iodine in 2018, currently hypothyroid, on supplementation; status post ; patient had COVID in February of last year. MEDICATIONS:  Medications prior to admission include levothyroxine 88 mcg 6 days a week and 132 mcg 1 day a week; dicyclomine 10 mg 4 times a day, before meals and at bedtime; fluoxetine 40 mg daily. ALLERGIES:  No known drug allergies. FAMILY HISTORY:  Patient's mother is 67. She was diagnosed with breast cancer at 61years of age, also has hypertension. Her father is currently 79years old. He was diagnosed coronary artery disease and an MI with PCI and stenting at age 72. He also has hypertension. SOCIAL HISTORY:  The patient smokes a pack per day for 25 years. Denies any significant alcohol use. No drug use. Works as a caregiver. REVIEW OF SYSTEMS:  Twelve systems were reviewed. Patient denied any fever or chills. No URI symptoms. No dysuria or increased frequency of urination. There are otherwise no additional pertinent positives or negatives on the 12-point review of systems except as listed in the History of Present Illness. PHYSICAL EXAMINATION:    VITAL SIGNS:  Temperature was 98.1, pulse 61, respiratory rate 19, blood pressure 113/62, O2 saturation 100% on room air. HEENT:  Normocephalic, atraumatic. Pupils equal, reactive. Sclerae anicteric. There was no sinus tenderness. Mucous membranes were moist.  NECK:  Supple. LUNGS:  Clear with easy respiratory excursions. No wheezes or rhonchi. HEART:  Regular rate and rhythm. Normal S1, S2.  ABDOMEN:  Soft. There was no significant distention. Bowel sounds were present. There was generalized diffuse tenderness to palpation. EXTREMITIES:  No clubbing, cyanosis, calf tenderness, palpable cords, or edema. SKIN:  Warm and dry without any significant rashes. NEUROLOGIC:  The patient was awake, alert, oriented x3 with no focal motor or sensory deficits. PSYCHIATRIC:  Her mood and affect were pleasant and cooperative. She did not appear anxious. BACK:  There was no costovertebral angle tenderness. LABORATORY DATA:  Glucose was 91, sodium 140, potassium 4.4, chloride 106, CO2 of 27, BUN of 9 with a creatinine of 0.62, calcium of 9.7, anion gap is 7, white count of 6.9, hemoglobin of 13.7 with a platelet count of 790,240, 51% neutrophils. Urinalysis revealed no signs of infection. CT scan of the abdomen and pelvis was previously mentioned. ASSESSMENT AND PLAN:    1. Intractable abdominal pain with nausea. Plan for EGD and colonoscopy in the morning. Patient to drink Colyte tonight. Will treat pain symptomatically, as she rates it a 10/10, with morphine until scopes are complete and hopefully provide an answer. A left adnexal cyst was seen as well. This I suppose could be causing discomfort. I think it is unlikely that it would cause the kind of discomfort the patient has been feeling.  however, if GI workup is negative, consider GYN evaluation.   Additionally, there appears to be some adhesive development from her previous  scar which is tethering the uterus, question whether her discomfort could be related to adhesive disease as well. Consider GYN evaluation if GI workup negative. 2.   Anxiety. Continue home medications. 3.   Hypothyroidism. Continue levothyroxine. 4.   DVT prophylaxis. SCDs. Will hold on any anticoagulation in case colonoscopy or EGD would require biopsy, but patient may be up ad tejas in the room. 5.   The patient's current clinical status and proposed treatment plan were discussed with her. All of her questions were answered and she agreed with the plan of care as outlined above.      Dictated By Anupam Weldon MD  d: 02/04/2022 06:01:22  t: 02/04/2022 08:34:42  Job [id number]  TXE/    cc: Daina Condon MD

## 2022-02-04 NOTE — PLAN OF CARE
Pt admitted onto floor last night. Golytely given at 0000, will be NPO at 0500 for colonoscopy and EGD. Consent printed in chart, will sign with pt this AM.  [place] on phone during care, ok to share info with him. Zofran given for nausea. Call light within reach, safety precautions in place. Will continue to monitor. Problem: Patient Centered Care  Goal: Patient preferences are identified and integrated in the patient's plan of care  Description: Interventions:  - What would you like us to know as we care for you?  From home with   - Provide timely, complete, and accurate information to patient/family  - Incorporate patient and family knowledge, values, beliefs, and cultural backgrounds into the planning and delivery of care  - Encourage patient/family to participate in care and decision-making at the level they choose  - Honor patient and family perspectives and choices  Outcome: Progressing     Problem: Patient/Family Goals  Goal: Patient/Family Long Term Goal  Description: Patient's Long Term Goal: to go back home    Interventions:  - monitor VS, labs, test results  - follow MD orders  - administer medications  - discharge planning   - See additional Care Plan goals for specific interventions  Outcome: Progressing  Goal: Patient/Family Short Term Goal  Description: Patient's Short Term Goal: no pain or nausea    Interventions:   - give prn medications  - monitor vitals  - frequent rounds  - See additional Care Plan goals for specific interventions  Outcome: Progressing     Problem: GASTROINTESTINAL - ADULT  Goal: Minimal or absence of nausea and vomiting  Description: INTERVENTIONS:  - Maintain adequate hydration with IV or PO as ordered and tolerated  - Nasogastric tube to low intermittent suction as ordered  - Evaluate effectiveness of ordered antiemetic medications  - Provide nonpharmacologic comfort measures as appropriate  - Advance diet as tolerated, if ordered  - Obtain nutritional consult as needed  - Evaluate fluid balance  Outcome: Progressing  Goal: Maintains or returns to baseline bowel function  Description: INTERVENTIONS:  - Assess bowel function  - Maintain adequate hydration with IV or PO as ordered and tolerated  - Evaluate effectiveness of GI medications  - Encourage mobilization and activity  - Obtain nutritional consult as needed  - Establish a toileting routine/schedule  - Consider collaborating with pharmacy to review patient's medication profile  Outcome: Progressing     Problem: SKIN/TISSUE INTEGRITY - ADULT  Goal: Skin integrity remains intact  Description: INTERVENTIONS  - Assess and document risk factors for pressure ulcer development  - Assess and document skin integrity  - Monitor for areas of redness and/or skin breakdown  - Initiate interventions, skin care algorithm/standards of care as needed  Outcome: Progressing     Problem: PAIN - ADULT  Goal: Verbalizes/displays adequate comfort level or patient's stated pain goal  Description: INTERVENTIONS:  - Encourage pt to monitor pain and request assistance  - Assess pain using appropriate pain scale  - Administer analgesics based on type and severity of pain and evaluate response  - Implement non-pharmacological measures as appropriate and evaluate response  - Consider cultural and social influences on pain and pain management  - Manage/alleviate anxiety  - Utilize distraction and/or relaxation techniques  - Monitor for opioid side effects  - Notify MD/LIP if interventions unsuccessful or patient reports new pain  - Anticipate increased pain with activity and pre-medicate as appropriate  Outcome: Progressing     Problem: SAFETY ADULT - FALL  Goal: Free from fall injury  Description: INTERVENTIONS:  - Assess pt frequently for physical needs  - Identify cognitive and physical deficits and behaviors that affect risk of falls.   - [place] fall precautions as indicated by assessment.  - Educate pt/family on patient safety including physical limitations  - Instruct pt to call for assistance with activity based on assessment  - Modify environment to reduce risk of injury  - Provide assistive devices as appropriate  - Consider OT/PT consult to assist with strengthening/mobility  - Encourage toileting schedule  Outcome: Progressing

## 2022-02-04 NOTE — PROGRESS NOTES
ADMISSION NOTE    55year old female with persistent abdominal pain of uncertain etiology has also had some mucous per rectum being followed by GI who advised admision with EGD and colonoscopy . Available medical records partially reviewed. Dictation to follow.     Micah Rothman M.D.  2/3/2022

## 2022-02-04 NOTE — ED INITIAL ASSESSMENT (HPI)
Ligia arrived through triage with her  for c/o lower abdominal pain and distention radiating to back and rectum x3 days, associated with n/v and \"mucus\" in her stool.

## 2022-02-05 ENCOUNTER — ANESTHESIA EVENT (OUTPATIENT)
Dept: ENDOSCOPY | Facility: HOSPITAL | Age: 47
DRG: 445 | End: 2022-02-05
Payer: MEDICAID

## 2022-02-05 ENCOUNTER — ANESTHESIA (OUTPATIENT)
Dept: ENDOSCOPY | Facility: HOSPITAL | Age: 47
DRG: 445 | End: 2022-02-05
Payer: MEDICAID

## 2022-02-05 LAB
ALBUMIN SERPL-MCNC: 3 G/DL (ref 3.4–5)
ALBUMIN/GLOB SERPL: 1.4 {RATIO} (ref 1–2)
ALP LIVER SERPL-CCNC: 42 U/L
ALT SERPL-CCNC: 16 U/L
ANION GAP SERPL CALC-SCNC: 2 MMOL/L (ref 0–18)
AST SERPL-CCNC: 14 U/L (ref 15–37)
BILIRUB SERPL-MCNC: 0.3 MG/DL (ref 0.1–2)
BUN/CREAT SERPL: 7.4 (ref 10–20)
CALCIUM BLD-MCNC: 8.5 MG/DL (ref 8.5–10.1)
CHLORIDE SERPL-SCNC: 113 MMOL/L (ref 98–112)
CO2 SERPL-SCNC: 29 MMOL/L (ref 21–32)
CREAT BLD-MCNC: 0.54 MG/DL
GLOBULIN PLAS-MCNC: 2.2 G/DL (ref 2.8–4.4)
GLUCOSE BLD-MCNC: 94 MG/DL (ref 70–99)
OSMOLALITY SERPL CALC.SUM OF ELEC: 295 MOSM/KG (ref 275–295)
PHOSPHATE SERPL-MCNC: 3.1 MG/DL (ref 2.5–4.9)
POTASSIUM SERPL-SCNC: 4.3 MMOL/L (ref 3.5–5.1)
PROT SERPL-MCNC: 5.2 G/DL (ref 6.4–8.2)
SODIUM SERPL-SCNC: 144 MMOL/L (ref 136–145)

## 2022-02-05 PROCEDURE — 0DBE8ZX EXCISION OF LARGE INTESTINE, VIA NATURAL OR ARTIFICIAL OPENING ENDOSCOPIC, DIAGNOSTIC: ICD-10-PCS | Performed by: INTERNAL MEDICINE

## 2022-02-05 PROCEDURE — 45380 COLONOSCOPY AND BIOPSY: CPT | Performed by: INTERNAL MEDICINE

## 2022-02-05 PROCEDURE — 0DBB8ZX EXCISION OF ILEUM, VIA NATURAL OR ARTIFICIAL OPENING ENDOSCOPIC, DIAGNOSTIC: ICD-10-PCS | Performed by: INTERNAL MEDICINE

## 2022-02-05 PROCEDURE — 43239 EGD BIOPSY SINGLE/MULTIPLE: CPT | Performed by: INTERNAL MEDICINE

## 2022-02-05 PROCEDURE — 0DB98ZX EXCISION OF DUODENUM, VIA NATURAL OR ARTIFICIAL OPENING ENDOSCOPIC, DIAGNOSTIC: ICD-10-PCS | Performed by: INTERNAL MEDICINE

## 2022-02-05 PROCEDURE — 45385 COLONOSCOPY W/LESION REMOVAL: CPT | Performed by: INTERNAL MEDICINE

## 2022-02-05 PROCEDURE — 99232 SBSQ HOSP IP/OBS MODERATE 35: CPT | Performed by: HOSPITALIST

## 2022-02-05 PROCEDURE — 0DBN8ZZ EXCISION OF SIGMOID COLON, VIA NATURAL OR ARTIFICIAL OPENING ENDOSCOPIC: ICD-10-PCS | Performed by: INTERNAL MEDICINE

## 2022-02-05 RX ORDER — SIMETHICONE 80 MG
80 TABLET,CHEWABLE ORAL
Status: DISCONTINUED | OUTPATIENT
Start: 2022-02-05 | End: 2022-02-08

## 2022-02-05 RX ORDER — CALCIUM CARBONATE 200(500)MG
1000 TABLET,CHEWABLE ORAL 3 TIMES DAILY PRN
Status: DISCONTINUED | OUTPATIENT
Start: 2022-02-05 | End: 2022-02-08

## 2022-02-05 RX ORDER — DIPHENHYDRAMINE HCL 25 MG
25 CAPSULE ORAL EVERY 6 HOURS PRN
Status: DISCONTINUED | OUTPATIENT
Start: 2022-02-05 | End: 2022-02-05

## 2022-02-05 RX ORDER — HYDROCORTISONE 25 MG/G
CREAM TOPICAL 2 TIMES DAILY
Status: DISCONTINUED | OUTPATIENT
Start: 2022-02-05 | End: 2022-02-08

## 2022-02-05 RX ORDER — DICYCLOMINE HYDROCHLORIDE 10 MG/ML
10 INJECTION INTRAMUSCULAR EVERY 6 HOURS PRN
Status: DISCONTINUED | OUTPATIENT
Start: 2022-02-05 | End: 2022-02-06

## 2022-02-05 RX ORDER — DIPHENHYDRAMINE HYDROCHLORIDE 50 MG/ML
25 INJECTION INTRAMUSCULAR; INTRAVENOUS EVERY 6 HOURS PRN
Status: DISCONTINUED | OUTPATIENT
Start: 2022-02-05 | End: 2022-02-08

## 2022-02-05 RX ORDER — LIDOCAINE HYDROCHLORIDE 10 MG/ML
INJECTION, SOLUTION EPIDURAL; INFILTRATION; INTRACAUDAL; PERINEURAL AS NEEDED
Status: DISCONTINUED | OUTPATIENT
Start: 2022-02-05 | End: 2022-02-05 | Stop reason: SURG

## 2022-02-05 RX ORDER — DICYCLOMINE HYDROCHLORIDE 10 MG/1
10 CAPSULE ORAL EVERY 6 HOURS PRN
Status: DISCONTINUED | OUTPATIENT
Start: 2022-02-05 | End: 2022-02-06

## 2022-02-05 RX ORDER — SIMETHICONE 80 MG
80 TABLET,CHEWABLE ORAL
Status: DISCONTINUED | OUTPATIENT
Start: 2022-02-05 | End: 2022-02-05

## 2022-02-05 RX ADMIN — LIDOCAINE HYDROCHLORIDE 50 MG: 10 INJECTION, SOLUTION EPIDURAL; INFILTRATION; INTRACAUDAL; PERINEURAL at 12:24:00

## 2022-02-05 NOTE — OPERATIVE REPORT
[facility] Endoscopy Report      Preoperative Diagnosis:  - abdominal pain      Postoperative Diagnosis:  - colon polyps x 5  - terminal ileal erosions   - diverticulosis   - small internal hemorrhoids  - duodenitis      Procedure:    Colonoscopy   Esophagogastroduodenoscopy      Surgeon:  Ya Mata M.D. Anesthesia:  MAC sedation    Technique:  After informed consent, the patient was placed in the left lateral recumbent position. Digital rectal examination revealed no palpable intraluminal abnormalities. An Olympus variable stiffness 190 series HD colonoscope was inserted into the rectum and advanced under direct vision by following the lumen to the cecum. The colon was examined upon withdrawal in the left lateral position. Following colonoscopy, an Olympus adult HD gastroscope was inserted into the hypopharynx and advanced under direct vision into the esophagus, stomach and duodenum. The endoscope was withdrawn to the stomach where retroflexion of the annulus, body, cardia and fundus was performed. The instrument was straightened, insufflated air and fluid were suctioned and the endoscope was withdrawn. The procedures were well tolerated without immediate complication. Findings:  The preparation of the colon was good. The terminal ileum was examined for 10 cm and showed 2 tiny erosions in the terminal ileum, biopsies taken. The ileocecal valve was well preserved. The visualized colonic mucosa from the cecum to the anal verge was normal with an intact vascular pattern. Random colon biopsies taken. Colon polyps x5 removed from the sigmoid colon, these ranged in size from sessile and diminutive and removed by cold forceps x2 to sessile in 4 to 8 mm in size removed by cold snare technique. All sites were inspected and found to be free of bleeding and specimens retrieved and sent for analysis.     Scattered rare diverticula noted in the sigmoid region and one noted in the descending colon, no evidence of diverticulitis. Small internal hemorrhoids noted on retroflexed view. The esophagus was normal.  The GE junction and diaphragmatic impression were at 37 cm, no hiatal hernia. The stomach distended appropriately with insufflated air. The mucosa of the stomach including cardia, fundus, gastric body and antrum were normal.  The duodenal bulb showed very subtle erythema/duodenitis, no ulcerations or erosions noted, biopsies taken    Estimated blood loss-insignificant  Specimens-terminal ileal biopsies, colon biopsies, colon polyps, duodenal biopsies    Impression:  - colon polyps x 5  - terminal ileal erosions   - diverticulosis   - small internal hemorrhoids  - duodenitis    Recommendations:  - Post polypectomy instructions given  - Repeat colonoscopy in 3- 7 years  - High fiber diet for diverticular disease  - Symptomatic treatment of hemorrhoids  - Follow up on biopsies of TI, would have the patient avoid NSAIDs.  - Follow up on random colon and duodenal biopsies  - No specific etiology noted for significant abdominal pain, gallstones noted on the CT scan but her pain profile/clinical presentation does not suggest biliary colic. Subtle small focal erosions noted x2 in the terminal ileum, follow-up in these biopsies but Crohn's disease thought to be less likely however she does have a family history of IBD. - Advance diet          Marisol Damian MD  2/5/2022  1:16 PM

## 2022-02-05 NOTE — ANESTHESIA POSTPROCEDURE EVALUATION
Patient: Fletcher Bullard    Procedure Summary     Date: 02/05/22 Room / Location: [address] [place] [address] ENDOSCOPY 04 / [facility] ENDOSCOPY    Anesthesia Start: 1218 Anesthesia Stop: 1311    Procedures:       ESOPHAGOGASTRODUODENOSCOPY (EGD) (N/A )      COLONOSCOPY (N/A ) Diagnosis: (polyps, diverticulosis, hemorrhioids, duodenitis)    Surgeons: Buzz Joseph MD Anesthesiologist: Rolo Toribio MD    Anesthesia Type: MAC ASA Status: 3          Anesthesia Type: MAC    PACU Vitals    BP (!) 85/47 (02/05/22 1315)    Temp      Pulse 66 (02/05/22 1315)   Resp 18 (02/05/22 1315)    SpO2 95 % (02/05/22 1315)        [facility] AN Post Evaluation:   Patient Evaluated in PACU  Patient Participation: complete - patient participated  Level of Consciousness: awake  Pain Management: adequate  Airway Patency:patent  Yes    Cardiovascular Status: acceptable  Respiratory Status: acceptable  Postoperative Hydration acceptable      Luisa Moreau MD  2/5/2022 1:22 PM

## 2022-02-05 NOTE — PLAN OF CARE
Problem: Patient Centered Care  Goal: Patient preferences are identified and integrated in the patient's plan of care  Description: Interventions:  - What would you like us to know as we care for you?  From home with   - Provide timely, complete, and accurate information to patient/family  - Incorporate patient and family knowledge, values, beliefs, and cultural backgrounds into the planning and delivery of care  - Encourage patient/family to participate in care and decision-making at the level they choose  - Honor patient and family perspectives and choices  Outcome: Progressing     Problem: Patient/Family Goals  Goal: Patient/Family Long Term Goal  Description: Patient's Long Term Goal: to go back home    Interventions:  - monitor VS, labs, test results  - follow MD orders  - administer medications  - discharge planning   - See additional Care Plan goals for specific interventions  Outcome: Progressing  Goal: Patient/Family Short Term Goal  Description: Patient's Short Term Goal: no pain or nausea    Interventions:   - give prn medications  - monitor vitals  - frequent rounds  - See additional Care Plan goals for specific interventions  Outcome: Progressing     Problem: GASTROINTESTINAL - ADULT  Goal: Minimal or absence of nausea and vomiting  Description: INTERVENTIONS:  - Maintain adequate hydration with IV or PO as ordered and tolerated  - Nasogastric tube to low intermittent suction as ordered  - Evaluate effectiveness of ordered antiemetic medications  - Provide nonpharmacologic comfort measures as appropriate  - Advance diet as tolerated, if ordered  - Obtain nutritional consult as needed  - Evaluate fluid balance  Outcome: Progressing  Goal: Maintains or returns to baseline bowel function  Description: INTERVENTIONS:  - Assess bowel function  - Maintain adequate hydration with IV or PO as ordered and tolerated  - Evaluate effectiveness of GI medications  - Encourage mobilization and activity  - Obtain nutritional consult as needed  - Establish a toileting routine/schedule  - Consider collaborating with pharmacy to review patient's medication profile  Outcome: Progressing     Problem: SKIN/TISSUE INTEGRITY - ADULT  Goal: Skin integrity remains intact  Description: INTERVENTIONS  - Assess and document risk factors for pressure ulcer development  - Assess and document skin integrity  - Monitor for areas of redness and/or skin breakdown  - Initiate interventions, skin care algorithm/standards of care as needed  Outcome: Progressing     Problem: SAFETY ADULT - FALL  Goal: Free from fall injury  Description: INTERVENTIONS:  - Assess pt frequently for physical needs  - Identify cognitive and physical deficits and behaviors that affect risk of falls.   - [place] fall precautions as indicated by assessment.  - Educate pt/family on patient safety including physical limitations  - Instruct pt to call for assistance with activity based on assessment  - Modify environment to reduce risk of injury  - Provide assistive devices as appropriate  - Consider OT/PT consult to assist with strengthening/mobility  - Encourage toileting schedule  Outcome: Progressing     Problem: PAIN - ADULT  Goal: Verbalizes/displays adequate comfort level or patient's stated pain goal  Description: INTERVENTIONS:  - Encourage pt to monitor pain and request assistance  - Assess pain using appropriate pain scale  - Administer analgesics based on type and severity of pain and evaluate response  - Implement non-pharmacological measures as appropriate and evaluate response  - Consider cultural and social influences on pain and pain management  - Manage/alleviate anxiety  - Utilize distraction and/or relaxation techniques  - Monitor for opioid side effects  - Notify MD/LIP if interventions unsuccessful or patient reports new pain  - Anticipate increased pain with activity and pre-medicate as appropriate  Outcome: Not Progressing     Bowel prep given. Patient reported new onset of \"bloody stools\". This RN paged Dr. Neo Ro and received orders. Patient now reporting stools are \"pink, clear\". Plans for colonoscopy/EGD tomorrow with Dr. Neo Ro. Call light within reach and safety precautions in place.

## 2022-02-06 ENCOUNTER — APPOINTMENT (OUTPATIENT)
Dept: ULTRASOUND IMAGING | Facility: HOSPITAL | Age: 47
DRG: 445 | End: 2022-02-06
Attending: HOSPITALIST
Payer: MEDICAID

## 2022-02-06 LAB
ALBUMIN SERPL-MCNC: 2.7 G/DL (ref 3.4–5)
ALBUMIN/GLOB SERPL: 1.2 {RATIO} (ref 1–2)
ALP LIVER SERPL-CCNC: 49 U/L
ALT SERPL-CCNC: 43 U/L
ANION GAP SERPL CALC-SCNC: 2 MMOL/L (ref 0–18)
AST SERPL-CCNC: 44 U/L (ref 15–37)
BASOPHILS # BLD AUTO: 0.03 X10(3) UL (ref 0–0.2)
BASOPHILS NFR BLD AUTO: 0.5 %
BILIRUB SERPL-MCNC: 0.2 MG/DL (ref 0.1–2)
BUN BLD-MCNC: 9 MG/DL (ref 7–18)
BUN/CREAT SERPL: 14.8 (ref 10–20)
CALCIUM BLD-MCNC: 8.5 MG/DL (ref 8.5–10.1)
CHLORIDE SERPL-SCNC: 109 MMOL/L (ref 98–112)
CO2 SERPL-SCNC: 29 MMOL/L (ref 21–32)
CREAT BLD-MCNC: 0.61 MG/DL
DEPRECATED RDW RBC AUTO: 39.4 FL (ref 35.1–46.3)
EOSINOPHIL # BLD AUTO: 0.21 X10(3) UL (ref 0–0.7)
EOSINOPHIL NFR BLD AUTO: 3.3 %
ERYTHROCYTE [DISTWIDTH] IN BLOOD BY AUTOMATED COUNT: 12.1 % (ref 11–15)
GLOBULIN PLAS-MCNC: 2.2 G/DL (ref 2.8–4.4)
GLUCOSE BLD-MCNC: 94 MG/DL (ref 70–99)
HCT VFR BLD AUTO: 30.7 %
HGB BLD-MCNC: 10.2 G/DL
IMM GRANULOCYTES # BLD AUTO: 0.01 X10(3) UL (ref 0–1)
IMM GRANULOCYTES NFR BLD: 0.2 %
LYMPHOCYTES # BLD AUTO: 2.4 X10(3) UL (ref 1–4)
LYMPHOCYTES NFR BLD AUTO: 37.6 %
MCH RBC QN AUTO: 29.6 PG (ref 26–34)
MCHC RBC AUTO-ENTMCNC: 33.2 G/DL (ref 31–37)
MCV RBC AUTO: 89 FL
MONOCYTES # BLD AUTO: 0.51 X10(3) UL (ref 0.1–1)
MONOCYTES NFR BLD AUTO: 8 %
NEUTROPHILS # BLD AUTO: 3.22 X10 (3) UL (ref 1.5–7.7)
NEUTROPHILS # BLD AUTO: 3.22 X10(3) UL (ref 1.5–7.7)
NEUTROPHILS NFR BLD AUTO: 50.4 %
OSMOLALITY SERPL CALC.SUM OF ELEC: 288 MOSM/KG (ref 275–295)
PLATELET # BLD AUTO: 219 10(3)UL (ref 150–450)
POTASSIUM SERPL-SCNC: 4.3 MMOL/L (ref 3.5–5.1)
PROT SERPL-MCNC: 4.9 G/DL (ref 6.4–8.2)
RBC # BLD AUTO: 3.45 X10(6)UL
SODIUM SERPL-SCNC: 140 MMOL/L (ref 136–145)
WBC # BLD AUTO: 6.4 X10(3) UL (ref 4–11)

## 2022-02-06 PROCEDURE — 99232 SBSQ HOSP IP/OBS MODERATE 35: CPT | Performed by: INTERNAL MEDICINE

## 2022-02-06 PROCEDURE — 99233 SBSQ HOSP IP/OBS HIGH 50: CPT | Performed by: HOSPITALIST

## 2022-02-06 PROCEDURE — 76705 ECHO EXAM OF ABDOMEN: CPT | Performed by: HOSPITALIST

## 2022-02-06 RX ORDER — ACETAMINOPHEN 325 MG/1
650 TABLET ORAL EVERY 6 HOURS PRN
Status: DISCONTINUED | OUTPATIENT
Start: 2022-02-06 | End: 2022-02-08

## 2022-02-06 RX ORDER — SODIUM CHLORIDE 9 MG/ML
INJECTION, SOLUTION INTRAVENOUS CONTINUOUS
Status: DISCONTINUED | OUTPATIENT
Start: 2022-02-06 | End: 2022-02-08

## 2022-02-06 RX ORDER — HYDROMORPHONE HYDROCHLORIDE 1 MG/ML
0.5 INJECTION, SOLUTION INTRAMUSCULAR; INTRAVENOUS; SUBCUTANEOUS EVERY 4 HOURS PRN
Status: DISCONTINUED | OUTPATIENT
Start: 2022-02-06 | End: 2022-02-08

## 2022-02-06 RX ORDER — HYOSCYAMINE SULFATE 0.125 MG
0.12 TABLET,DISINTEGRATING ORAL EVERY 4 HOURS PRN
Status: DISCONTINUED | OUTPATIENT
Start: 2022-02-06 | End: 2022-02-08

## 2022-02-06 RX ORDER — HYDROMORPHONE HYDROCHLORIDE 1 MG/ML
1 INJECTION, SOLUTION INTRAMUSCULAR; INTRAVENOUS; SUBCUTANEOUS EVERY 4 HOURS PRN
Status: DISCONTINUED | OUTPATIENT
Start: 2022-02-06 | End: 2022-02-08

## 2022-02-06 RX ORDER — KETOROLAC TROMETHAMINE 30 MG/ML
30 INJECTION, SOLUTION INTRAMUSCULAR; INTRAVENOUS EVERY 6 HOURS PRN
Status: DISCONTINUED | OUTPATIENT
Start: 2022-02-06 | End: 2022-02-06

## 2022-02-06 RX ORDER — HYDROCODONE BITARTRATE AND ACETAMINOPHEN 10; 325 MG/1; MG/1
1 TABLET ORAL EVERY 4 HOURS PRN
Status: DISCONTINUED | OUTPATIENT
Start: 2022-02-06 | End: 2022-02-08

## 2022-02-06 NOTE — PLAN OF CARE
No acute changes. Pain medications given PRN as ordered, little relief reported. MD notified and medications adjusted. Pt resting comfortably, call light within reach. Problem: Patient Centered Care  Goal: Patient preferences are identified and integrated in the patient's plan of care  Description: Interventions:  - What would you like us to know as we care for you?  From home with   - Provide timely, complete, and accurate information to patient/family  - Incorporate patient and family knowledge, values, beliefs, and cultural backgrounds into the planning and delivery of care  - Encourage patient/family to participate in care and decision-making at the level they choose  - Honor patient and family perspectives and choices  Outcome: Progressing     Problem: Patient/Family Goals  Goal: Patient/Family Long Term Goal  Description: Patient's Long Term Goal: to go back home    Interventions:  - monitor VS, labs, test results  - follow MD orders  - administer medications  - discharge planning   - See additional Care Plan goals for specific interventions  Outcome: Progressing  Goal: Patient/Family Short Term Goal  Description: Patient's Short Term Goal: no pain or nausea    Interventions:   - give prn medications  - monitor vitals  - frequent rounds  - See additional Care Plan goals for specific interventions  Outcome: Progressing     Problem: GASTROINTESTINAL - ADULT  Goal: Minimal or absence of nausea and vomiting  Description: INTERVENTIONS:  - Maintain adequate hydration with IV or PO as ordered and tolerated  - Nasogastric tube to low intermittent suction as ordered  - Evaluate effectiveness of ordered antiemetic medications  - Provide nonpharmacologic comfort measures as appropriate  - Advance diet as tolerated, if ordered  - Obtain nutritional consult as needed  - Evaluate fluid balance  Outcome: Progressing  Goal: Maintains or returns to baseline bowel function  Description: INTERVENTIONS:  - Assess bowel function  - Maintain adequate hydration with IV or PO as ordered and tolerated  - Evaluate effectiveness of GI medications  - Encourage mobilization and activity  - Obtain nutritional consult as needed  - Establish a toileting routine/schedule  - Consider collaborating with pharmacy to review patient's medication profile  Outcome: Progressing     Problem: SKIN/TISSUE INTEGRITY - ADULT  Goal: Skin integrity remains intact  Description: INTERVENTIONS  - Assess and document risk factors for pressure ulcer development  - Assess and document skin integrity  - Monitor for areas of redness and/or skin breakdown  - Initiate interventions, skin care algorithm/standards of care as needed  Outcome: Progressing     Problem: SAFETY ADULT - FALL  Goal: Free from fall injury  Description: INTERVENTIONS:  - Assess pt frequently for physical needs  - Identify cognitive and physical deficits and behaviors that affect risk of falls.   - [place] fall precautions as indicated by assessment.  - Educate pt/family on patient safety including physical limitations  - Instruct pt to call for assistance with activity based on assessment  - Modify environment to reduce risk of injury  - Provide assistive devices as appropriate  - Consider OT/PT consult to assist with strengthening/mobility  - Encourage toileting schedule  Outcome: Progressing     Problem: PAIN - ADULT  Goal: Verbalizes/displays adequate comfort level or patient's stated pain goal  Description: INTERVENTIONS:  - Encourage pt to monitor pain and request assistance  - Assess pain using appropriate pain scale  - Administer analgesics based on type and severity of pain and evaluate response  - Implement non-pharmacological measures as appropriate and evaluate response  - Consider cultural and social influences on pain and pain management  - Manage/alleviate anxiety  - Utilize distraction and/or relaxation techniques  - Monitor for opioid side effects  - Notify MD/LIP if interventions unsuccessful or patient reports new pain  - Anticipate increased pain with activity and pre-medicate as appropriate  Outcome: Not Progressing

## 2022-02-06 NOTE — PLAN OF CARE
No acute changes. Pt up independently in the room, calls appropriately. Pt resting comfortably, call light within reach. No complaints of nausea. Problem: Patient Centered Care  Goal: Patient preferences are identified and integrated in the patient's plan of care  Description: Interventions:  - What would you like us to know as we care for you?  From home with   - Provide timely, complete, and accurate information to patient/family  - Incorporate patient and family knowledge, values, beliefs, and cultural backgrounds into the planning and delivery of care  - Encourage patient/family to participate in care and decision-making at the level they choose  - Honor patient and family perspectives and choices  Outcome: Progressing     Problem: Patient/Family Goals  Goal: Patient/Family Long Term Goal  Description: Patient's Long Term Goal: to go back home    Interventions:  - monitor VS, labs, test results  - follow MD orders  - administer medications  - discharge planning   - See additional Care Plan goals for specific interventions  Outcome: Progressing  Goal: Patient/Family Short Term Goal  Description: Patient's Short Term Goal: no pain or nausea    Interventions:   - give prn medications  - monitor vitals  - frequent rounds  - See additional Care Plan goals for specific interventions  Outcome: Progressing     Problem: GASTROINTESTINAL - ADULT  Goal: Minimal or absence of nausea and vomiting  Description: INTERVENTIONS:  - Maintain adequate hydration with IV or PO as ordered and tolerated  - Nasogastric tube to low intermittent suction as ordered  - Evaluate effectiveness of ordered antiemetic medications  - Provide nonpharmacologic comfort measures as appropriate  - Advance diet as tolerated, if ordered  - Obtain nutritional consult as needed  - Evaluate fluid balance  Outcome: Progressing  Goal: Maintains or returns to baseline bowel function  Description: INTERVENTIONS:  - Assess bowel function  - Maintain adequate hydration with IV or PO as ordered and tolerated  - Evaluate effectiveness of GI medications  - Encourage mobilization and activity  - Obtain nutritional consult as needed  - Establish a toileting routine/schedule  - Consider collaborating with pharmacy to review patient's medication profile  Outcome: Progressing     Problem: SKIN/TISSUE INTEGRITY - ADULT  Goal: Skin integrity remains intact  Description: INTERVENTIONS  - Assess and document risk factors for pressure ulcer development  - Assess and document skin integrity  - Monitor for areas of redness and/or skin breakdown  - Initiate interventions, skin care algorithm/standards of care as needed  Outcome: Progressing     Problem: PAIN - ADULT  Goal: Verbalizes/displays adequate comfort level or patient's stated pain goal  Description: INTERVENTIONS:  - Encourage pt to monitor pain and request assistance  - Assess pain using appropriate pain scale  - Administer analgesics based on type and severity of pain and evaluate response  - Implement non-pharmacological measures as appropriate and evaluate response  - Consider cultural and social influences on pain and pain management  - Manage/alleviate anxiety  - Utilize distraction and/or relaxation techniques  - Monitor for opioid side effects  - Notify MD/LIP if interventions unsuccessful or patient reports new pain  - Anticipate increased pain with activity and pre-medicate as appropriate  Outcome: Progressing     Problem: SAFETY ADULT - FALL  Goal: Free from fall injury  Description: INTERVENTIONS:  - Assess pt frequently for physical needs  - Identify cognitive and physical deficits and behaviors that affect risk of falls.   - [place] fall precautions as indicated by assessment.  - Educate pt/family on patient safety including physical limitations  - Instruct pt to call for assistance with activity based on assessment  - Modify environment to reduce risk of injury  - Provide assistive devices as appropriate  - Consider OT/PT consult to assist with strengthening/mobility  - Encourage toileting schedule  Outcome: Progressing

## 2022-02-06 NOTE — PLAN OF CARE
Problem: GASTROINTESTINAL - ADULT  Goal: Minimal or absence of nausea and vomiting  Description: INTERVENTIONS:  - Maintain adequate hydration with IV or PO as ordered and tolerated  - Evaluate effectiveness of ordered antiemetic medications  - Provide nonpharmacologic comfort measures as appropriate  - Advance diet as tolerated, if ordered  - Obtain nutritional consult as needed  - Evaluate fluid balance  Outcome: Progressing     Problem: SKIN/TISSUE INTEGRITY - ADULT  Goal: Skin integrity remains intact  Description: INTERVENTIONS  - Assess and document risk factors for pressure ulcer development  - Assess and document skin integrity  - Monitor for areas of redness and/or skin breakdown  - Initiate interventions, skin care algorithm/standards of care as needed  Outcome: Progressing     Problem: SAFETY ADULT - FALL  Goal: Free from fall injury  Description: INTERVENTIONS:  - Assess pt frequently for physical needs  - Identify cognitive and physical deficits and behaviors that affect risk of falls.   - [place] fall precautions as indicated by assessment.  - Educate pt/family on patient safety including physical limitations  - Instruct pt to call for assistance with activity based on assessment  - Modify environment to reduce risk of injury  Outcome: Progressing     Problem: PAIN - ADULT  Goal: Verbalizes/displays adequate comfort level or patient's stated pain goal  Description: INTERVENTIONS:  - Encourage pt to monitor pain and request assistance  - Assess pain using appropriate pain scale  - Administer analgesics based on type and severity of pain and evaluate response  - Implement non-pharmacological measures as appropriate and evaluate response  - Consider cultural and social influences on pain and pain management  - Manage/alleviate anxiety  - Utilize distraction and/or relaxation techniques  - Monitor for opioid side effects  - Notify MD/LIP if interventions unsuccessful or patient reports new pain  - Anticipate increased pain with activity and pre-medicate as appropriate  Outcome: Not Progressing   Bentyl and morphine given.

## 2022-02-07 ENCOUNTER — APPOINTMENT (OUTPATIENT)
Dept: NUCLEAR MEDICINE | Facility: HOSPITAL | Age: 47
DRG: 445 | End: 2022-02-07
Attending: PHYSICIAN ASSISTANT
Payer: MEDICAID

## 2022-02-07 ENCOUNTER — TELEPHONE (OUTPATIENT)
Dept: GASTROENTEROLOGY | Facility: CLINIC | Age: 47
End: 2022-02-07

## 2022-02-07 ENCOUNTER — MED REC SCAN ONLY (OUTPATIENT)
Dept: GASTROENTEROLOGY | Facility: CLINIC | Age: 47
End: 2022-02-07

## 2022-02-07 LAB
ALBUMIN SERPL-MCNC: 2.9 G/DL (ref 3.4–5)
ALBUMIN/GLOB SERPL: 1.1 {RATIO} (ref 1–2)
ALP LIVER SERPL-CCNC: 52 U/L
ALT SERPL-CCNC: 56 U/L
ANION GAP SERPL CALC-SCNC: 6 MMOL/L (ref 0–18)
AST SERPL-CCNC: 39 U/L (ref 15–37)
BASOPHILS # BLD AUTO: 0.03 X10(3) UL (ref 0–0.2)
BASOPHILS NFR BLD AUTO: 0.4 %
BILIRUB SERPL-MCNC: 0.2 MG/DL (ref 0.1–2)
BUN BLD-MCNC: 8 MG/DL (ref 7–18)
BUN/CREAT SERPL: 15.4 (ref 10–20)
CALCIUM BLD-MCNC: 8.8 MG/DL (ref 8.5–10.1)
CHLORIDE SERPL-SCNC: 106 MMOL/L (ref 98–112)
CO2 SERPL-SCNC: 27 MMOL/L (ref 21–32)
CREAT BLD-MCNC: 0.52 MG/DL
DEPRECATED RDW RBC AUTO: 38.1 FL (ref 35.1–46.3)
EOSINOPHIL # BLD AUTO: 0.21 X10(3) UL (ref 0–0.7)
EOSINOPHIL NFR BLD AUTO: 2.5 %
ERYTHROCYTE [DISTWIDTH] IN BLOOD BY AUTOMATED COUNT: 11.9 % (ref 11–15)
GLOBULIN PLAS-MCNC: 2.6 G/DL (ref 2.8–4.4)
GLUCOSE BLD-MCNC: 107 MG/DL (ref 70–99)
HCT VFR BLD AUTO: 30.7 %
HGB BLD-MCNC: 10.6 G/DL
IMM GRANULOCYTES # BLD AUTO: 0.03 X10(3) UL (ref 0–1)
IMM GRANULOCYTES NFR BLD: 0.4 %
LYMPHOCYTES # BLD AUTO: 1.33 X10(3) UL (ref 1–4)
LYMPHOCYTES NFR BLD AUTO: 16.1 %
MCH RBC QN AUTO: 29.9 PG (ref 26–34)
MCHC RBC AUTO-ENTMCNC: 34.5 G/DL (ref 31–37)
MCV RBC AUTO: 86.7 FL
MONOCYTES # BLD AUTO: 0.63 X10(3) UL (ref 0.1–1)
MONOCYTES NFR BLD AUTO: 7.6 %
NEUTROPHILS # BLD AUTO: 6.04 X10 (3) UL (ref 1.5–7.7)
NEUTROPHILS # BLD AUTO: 6.04 X10(3) UL (ref 1.5–7.7)
NEUTROPHILS NFR BLD AUTO: 73 %
OSMOLALITY SERPL CALC.SUM OF ELEC: 287 MOSM/KG (ref 275–295)
PLATELET # BLD AUTO: 205 10(3)UL (ref 150–450)
POTASSIUM SERPL-SCNC: 3.9 MMOL/L (ref 3.5–5.1)
PROT SERPL-MCNC: 5.5 G/DL (ref 6.4–8.2)
RBC # BLD AUTO: 3.54 X10(6)UL
SARS-COV-2 RNA RESP QL NAA+PROBE: NOT DETECTED
SODIUM SERPL-SCNC: 139 MMOL/L (ref 136–145)
WBC # BLD AUTO: 8.3 X10(3) UL (ref 4–11)

## 2022-02-07 PROCEDURE — 78227 HEPATOBIL SYST IMAGE W/DRUG: CPT | Performed by: PHYSICIAN ASSISTANT

## 2022-02-07 PROCEDURE — 99233 SBSQ HOSP IP/OBS HIGH 50: CPT | Performed by: PHYSICIAN ASSISTANT

## 2022-02-07 PROCEDURE — 99233 SBSQ HOSP IP/OBS HIGH 50: CPT | Performed by: HOSPITALIST

## 2022-02-07 PROCEDURE — 99222 1ST HOSP IP/OBS MODERATE 55: CPT | Performed by: SURGERY

## 2022-02-07 RX ORDER — FLUOXETINE HYDROCHLORIDE 20 MG/1
40 CAPSULE ORAL DAILY
Status: DISCONTINUED | OUTPATIENT
Start: 2022-02-07 | End: 2022-02-08

## 2022-02-07 NOTE — PLAN OF CARE
Problem: Patient Centered Care  Goal: Patient preferences are identified and integrated in the patient's plan of care  Description: Interventions:  - What would you like us to know as we care for you?  From home with   - Provide timely, complete, and accurate information to patient/family  - Incorporate patient and family knowledge, values, beliefs, and cultural backgrounds into the planning and delivery of care  - Encourage patient/family to participate in care and decision-making at the level they choose  - Honor patient and family perspectives and choices  Outcome: Progressing     Problem: Patient/Family Goals  Goal: Patient/Family Long Term Goal  Description: Patient's Long Term Goal: to go back home    Interventions:  - monitor VS, labs, test results  - follow MD orders  - administer medications  - discharge planning   - See additional Care Plan goals for specific interventions  Outcome: Progressing  Goal: Patient/Family Short Term Goal  Description: Patient's Short Term Goal: no pain or nausea    Interventions:   - give prn medications  - monitor vitals  - frequent rounds  - See additional Care Plan goals for specific interventions  Outcome: Progressing     Problem: GASTROINTESTINAL - ADULT  Goal: Minimal or absence of nausea and vomiting  Description: INTERVENTIONS:  - Maintain adequate hydration with IV or PO as ordered and tolerated  - Nasogastric tube to low intermittent suction as ordered  - Evaluate effectiveness of ordered antiemetic medications  - Provide nonpharmacologic comfort measures as appropriate  - Advance diet as tolerated, if ordered  - Obtain nutritional consult as needed  - Evaluate fluid balance  Outcome: Progressing  Goal: Maintains or returns to baseline bowel function  Description: INTERVENTIONS:  - Assess bowel function  - Maintain adequate hydration with IV or PO as ordered and tolerated  - Evaluate effectiveness of GI medications  - Encourage mobilization and activity  - Obtain nutritional consult as needed  - Establish a toileting routine/schedule  - Consider collaborating with pharmacy to review patient's medication profile  Outcome: Progressing     Problem: SKIN/TISSUE INTEGRITY - ADULT  Goal: Skin integrity remains intact  Description: INTERVENTIONS  - Assess and document risk factors for pressure ulcer development  - Assess and document skin integrity  - Monitor for areas of redness and/or skin breakdown  - Initiate interventions, skin care algorithm/standards of care as needed  Outcome: Progressing     Problem: PAIN - ADULT  Goal: Verbalizes/displays adequate comfort level or patient's stated pain goal  Description: INTERVENTIONS:  - Encourage pt to monitor pain and request assistance  - Assess pain using appropriate pain scale  - Administer analgesics based on type and severity of pain and evaluate response  - Implement non-pharmacological measures as appropriate and evaluate response  - Consider cultural and social influences on pain and pain management  - Manage/alleviate anxiety  - Utilize distraction and/or relaxation techniques  - Monitor for opioid side effects  - Notify MD/LIP if interventions unsuccessful or patient reports new pain  - Anticipate increased pain with activity and pre-medicate as appropriate  Outcome: Progressing     Problem: SAFETY ADULT - FALL  Goal: Free from fall injury  Description: INTERVENTIONS:  - Assess pt frequently for physical needs  - Identify cognitive and physical deficits and behaviors that affect risk of falls.   - [place] fall precautions as indicated by assessment.  - Educate pt/family on patient safety including physical limitations  - Instruct pt to call for assistance with activity based on assessment  - Modify environment to reduce risk of injury  - Provide assistive devices as appropriate  - Consider OT/PT consult to assist with strengthening/mobility  - Encourage toileting schedule  Outcome: Progressing

## 2022-02-07 NOTE — CONSULTS
[facility]    PATIENT'S NAME: Ubaldo ROSE   ATTENDING PHYSICIAN: Dang George MD   CONSULTING PHYSICIAN: Saleem Burns MD   PATIENT ACCOUNT#:   [id number]    LOCATION:  [place] [address] RECORD #:   [id number]       YOB: 1975  ADMISSION DATE:       2022      CONSULT DATE:  2022    REPORT OF CONSULTATION      REASON FOR CONSULTATION:  Cholecystitis. HISTORY OF PRESENT ILLNESS:  The patient is a 51-year-old female who was admitted on  with abdominal pain, nausea, mostly postprandial.  Workup, including a CT scan, demonstrates gallstones. Her pain has persisted. She was seen and evaluated by GI and had a colonoscopy where several benign-appearing polyps and small TI erosions were identified. Ultrasound of the gallbladder demonstrates diffuse thickening of the wall, a stone in the neck suspicious for acute cholecystitis. Her history and exam are not totally consistent with this, and a HIDA will be done this morning. PAST MEDICAL HISTORY:  Anxiety, depression, hyperlipidemia, Hashimoto's post radioactive I-31, , COVID last year. MEDICATIONS:  Please see reconciliation. ALLERGIES:  None. FAMILY HISTORY:  Mother with breast cancer. Father with coronary disease. SOCIAL HISTORY:  She smokes approximately a pack a day for the last 25 years. No illicit drug use. REVIEW OF SYSTEMS:  Significant for abdominal pain, mostly upper bilateral and epigastrium. Nausea with meals. Otherwise, 12-point system unremarkable. PHYSICAL EXAMINATION:    GENERAL:  She appears comfortable in no distress. VITAL SIGNS:  She is afebrile with stable vital signs. HEENT:  Her sclerae are nonicteric. NECK:  Supple without lymphadenopathy. LUNGS:  Clear. HEART:  Regular rate and rhythm. ABDOMEN:  Soft, slightly distended. Bilateral upper abdominal tenderness. No rebound, guarding, or rigidity.   EXTREMITIES:  Without clubbing, cyanosis, or edema.    LABORATORY DATA:  LFTs normal.  White count 6.9.      CT, ultrasound reviewed. IMPRESSION:  Abdominal pain, etiology unclear. Ultrasound suspicious for cholecystitis with cholelithiasis. PLAN:  HIDA scan this morning. Further recommendations to follow. I thank you for this consultation.     Dictated By Kayce Mejia MD  d: 02/07/2022 11:33:24  t: 02/07/2022 12:30:54  Job [id number]  GL/    cc: Ching Mercer MD

## 2022-02-07 NOTE — PROGRESS NOTES
General surgery consult    Consult will be dictated    Possible cholecystitis. -Await HIDA results this morning.   Keep n.p.o. after midnight

## 2022-02-07 NOTE — PLAN OF CARE
Problem: SKIN/TISSUE INTEGRITY - ADULT  Goal: Skin integrity remains intact  Description: INTERVENTIONS  - Assess and document risk factors for pressure ulcer development  - Assess and document skin integrity  - Monitor for areas of redness and/or skin breakdown  - Initiate interventions, skin care algorithm/standards of care as needed  Outcome: Progressing     Problem: SAFETY ADULT - FALL  Goal: Free from fall injury  Description: INTERVENTIONS:  - Assess pt frequently for physical needs  - Identify cognitive and physical deficits and behaviors that affect risk of falls. - [place] fall precautions as indicated by assessment.  - Educate pt/family on patient safety including physical limitations  - Instruct pt to call for assistance with activity based on assessment  - Modify environment to reduce risk of injury  - Provide assistive devices as appropriate  Outcome: Progressing     Problem: PAIN - ADULT  Goal: Verbalizes/displays adequate comfort level or patient's stated pain goal  Description: INTERVENTIONS:  - Encourage pt to monitor pain and request assistance  - Assess pain using appropriate pain scale  - Administer analgesics based on type and severity of pain and evaluate response  - Implement non-pharmacological measures as appropriate and evaluate response  - Consider cultural and social influences on pain and pain management  - Manage/alleviate anxiety  - Utilize distraction and/or relaxation techniques  - Monitor for opioid side effects  - Notify MD/LIP if interventions unsuccessful or patient reports new pain  - Anticipate increased pain with activity and pre-medicate as appropriate  Outcome: Not Progressing   Npo. Pt refused ivf.

## 2022-02-07 NOTE — TELEPHONE ENCOUNTER
----- Message from Karon Rhodes MD sent at 2/7/2022  1:18 PM CST -----  Inpatient colonoscopy and EGD results

## 2022-02-07 NOTE — TELEPHONE ENCOUNTER
Patient is still admitted to bed 518-A. Her spouse London answered the phone. He is on the signed verbal release. I reviewed below complete result note with London and he voiced understanding and will relay the results to his spouse. Health Maintenance Updated. 3 year colonoscopy recall entered into patient outreach in [address] [place] [address] Rd. Next colonoscopy will be due 2/5/2025.

## 2022-02-08 VITALS
SYSTOLIC BLOOD PRESSURE: 126 MMHG | DIASTOLIC BLOOD PRESSURE: 56 MMHG | HEART RATE: 73 BPM | WEIGHT: 115.19 LBS | HEIGHT: 57 IN | TEMPERATURE: 98 F | RESPIRATION RATE: 18 BRPM | BODY MASS INDEX: 24.85 KG/M2 | OXYGEN SATURATION: 95 %

## 2022-02-08 LAB — TTG IGA SER-ACNC: 0.1 U/ML (ref ?–7)

## 2022-02-08 PROCEDURE — 99239 HOSP IP/OBS DSCHRG MGMT >30: CPT | Performed by: HOSPITALIST

## 2022-02-08 PROCEDURE — 99233 SBSQ HOSP IP/OBS HIGH 50: CPT | Performed by: PHYSICIAN ASSISTANT

## 2022-02-08 PROCEDURE — 99232 SBSQ HOSP IP/OBS MODERATE 35: CPT | Performed by: SURGERY

## 2022-02-08 RX ORDER — ONDANSETRON 4 MG/1
4 TABLET, ORALLY DISINTEGRATING ORAL EVERY 8 HOURS PRN
Qty: 20 TABLET | Refills: 0 | Status: SHIPPED | OUTPATIENT
Start: 2022-02-08

## 2022-02-08 RX ORDER — SIMETHICONE 80 MG
80 TABLET,CHEWABLE ORAL
Refills: 0 | Status: SHIPPED | COMMUNITY
Start: 2022-02-08

## 2022-02-08 RX ORDER — PANTOPRAZOLE SODIUM 40 MG/1
40 TABLET, DELAYED RELEASE ORAL
Qty: 30 TABLET | Refills: 0 | Status: SHIPPED | OUTPATIENT
Start: 2022-02-09 | End: 2022-02-12 | Stop reason: ALTCHOICE

## 2022-02-08 RX ORDER — PANTOPRAZOLE SODIUM 40 MG/1
40 TABLET, DELAYED RELEASE ORAL
Status: DISCONTINUED | OUTPATIENT
Start: 2022-02-09 | End: 2022-02-08

## 2022-02-08 RX ORDER — HYOSCYAMINE SULFATE 0.125 MG
0.12 TABLET,DISINTEGRATING ORAL EVERY 4 HOURS PRN
Qty: 30 TABLET | Refills: 0 | Status: SHIPPED | OUTPATIENT
Start: 2022-02-08

## 2022-02-08 RX ORDER — HYDROCODONE BITARTRATE AND ACETAMINOPHEN 10; 325 MG/1; MG/1
1 TABLET ORAL EVERY 6 HOURS PRN
Qty: 10 TABLET | Refills: 0 | Status: SHIPPED | OUTPATIENT
Start: 2022-02-08 | End: 2022-02-12

## 2022-02-08 NOTE — PLAN OF CARE
Problem: GASTROINTESTINAL - ADULT  Goal: Minimal or absence of nausea and vomiting  Description: INTERVENTIONS:  - Maintain adequate hydration with IV or PO as ordered and tolerated  - Evaluate effectiveness of ordered antiemetic medications  - Provide nonpharmacologic comfort measures as appropriate  - Advance diet as tolerated, if ordered  - Obtain nutritional consult as needed  Outcome: Progressing     Problem: SKIN/TISSUE INTEGRITY - ADULT  Goal: Skin integrity remains intact  Description: INTERVENTIONS  - Assess and document risk factors for pressure ulcer development  - Assess and document skin integrity  - Monitor for areas of redness and/or skin breakdown  - Initiate interventions, skin care algorithm/standards of care as needed  Outcome: Progressing     Problem: PAIN - ADULT  Goal: Verbalizes/displays adequate comfort level or patient's stated pain goal  Description: INTERVENTIONS:  - Encourage pt to monitor pain and request assistance  - Assess pain using appropriate pain scale  - Administer analgesics based on type and severity of pain and evaluate response  - Implement non-pharmacological measures as appropriate and evaluate response  - Consider cultural and social influences on pain and pain management  - Manage/alleviate anxiety  - Utilize distraction and/or relaxation techniques  - Monitor for opioid side effects  - Notify MD/LIP if interventions unsuccessful or patient reports new pain  - Anticipate increased pain with activity and pre-medicate as appropriate  Outcome: Progressing     Problem: SAFETY ADULT - FALL  Goal: Free from fall injury  Description: INTERVENTIONS:  - Assess pt frequently for physical needs  - Identify cognitive and physical deficits and behaviors that affect risk of falls.   - [place] fall precautions as indicated by assessment.  - Educate pt/family on patient safety including physical limitations  - Instruct pt to call for assistance with activity based on assessment  - Modify environment to reduce risk of injury  Outcome: Progressing

## 2022-02-08 NOTE — PLAN OF CARE
Problem: Patient Centered Care  Goal: Patient preferences are identified and integrated in the patient's plan of care  Description: Interventions:  - What would you like us to know as we care for you?  From home with   - Provide timely, complete, and accurate information to patient/family  - Incorporate patient and family knowledge, values, beliefs, and cultural backgrounds into the planning and delivery of care  - Encourage patient/family to participate in care and decision-making at the level they choose  - Honor patient and family perspectives and choices  Outcome: Progressing     Problem: GASTROINTESTINAL - ADULT  Goal: Minimal or absence of nausea and vomiting  Description: INTERVENTIONS:  - Maintain adequate hydration with IV or PO as ordered and tolerated  - Nasogastric tube to low intermittent suction as ordered  - Evaluate effectiveness of ordered antiemetic medications  - Provide nonpharmacologic comfort measures as appropriate  - Advance diet as tolerated, if ordered  - Obtain nutritional consult as needed  - Evaluate fluid balance  Outcome: Progressing  Goal: Maintains or returns to baseline bowel function  Description: INTERVENTIONS:  - Assess bowel function  - Maintain adequate hydration with IV or PO as ordered and tolerated  - Evaluate effectiveness of GI medications  - Encourage mobilization and activity  - Obtain nutritional consult as needed  - Establish a toileting routine/schedule  - Consider collaborating with pharmacy to review patient's medication profile  Outcome: Progressing     Problem: SKIN/TISSUE INTEGRITY - ADULT  Goal: Skin integrity remains intact  Description: INTERVENTIONS  - Assess and document risk factors for pressure ulcer development  - Assess and document skin integrity  - Monitor for areas of redness and/or skin breakdown  - Initiate interventions, skin care algorithm/standards of care as needed  Outcome: Progressing     Problem: PAIN - ADULT  Goal: Verbalizes/displays adequate comfort level or patient's stated pain goal  Description: INTERVENTIONS:  - Encourage pt to monitor pain and request assistance  - Assess pain using appropriate pain scale  - Administer analgesics based on type and severity of pain and evaluate response  - Implement non-pharmacological measures as appropriate and evaluate response  - Consider cultural and social influences on pain and pain management  - Manage/alleviate anxiety  - Utilize distraction and/or relaxation techniques  - Monitor for opioid side effects  - Notify MD/LIP if interventions unsuccessful or patient reports new pain  - Anticipate increased pain with activity and pre-medicate as appropriate  Outcome: Progressing     Problem: SAFETY ADULT - FALL  Goal: Free from fall injury  Description: INTERVENTIONS:  - Assess pt frequently for physical needs  - Identify cognitive and physical deficits and behaviors that affect risk of falls.   - [place] fall precautions as indicated by assessment.  - Educate pt/family on patient safety including physical limitations  - Instruct pt to call for assistance with activity based on assessment  - Modify environment to reduce risk of injury  - Provide assistive devices as appropriate  - Consider OT/PT consult to assist with strengthening/mobility  - Encourage toileting schedule  Outcome: Progressing

## 2022-02-08 NOTE — TELEPHONE ENCOUNTER
Recall colon in 3 years per Dr Mariano Portillo.  Colon done 02/05/2022    Next due 02/05/2022    Health maintenance updated and message sent to pt outreach

## 2022-02-08 NOTE — DISCHARGE SUMMARY
[facility] Discharge Summary   Patient ID:  Mana Messina  [id number]  55year old  10/16/1975  Admit date: 2/3/2022  Discharge date: 2022  Primary Care Physician: Angélica Russ MD   Attending Physician: Valencia Amezquita MD   Consults:   Consultants  Chat With All Active Members      Provider Role Specialty    Theo Linton MD  Consulting Physician  SURGERY, GENERAL    Nafisa Chavez MD  Consulting Physician  GASTROENTEROLOGY            Reason for admission Abdominal Pain  History of Present Illness  By Dr. Hannah Morris   This is a very pleasant 78-year-old woman with a past medical history of depression, anxiety, previous , Hashimoto thyroiditis, status post radioactive iodine treatment and on supplemental thyroid medication, who states that for about the past month or so she has been complaining of abdominal pain and change in bowel habits. She says that she typically will have a bowel movement in the morning and then probably 4 or 5 more after that, but they are nothing but mucus. No further stool is passed. She describes the pain as diffuse, although it seems to start primarily in the left lower quadrant. She also has been quite nauseated, which has made it difficult for her to eat, but she has not vomited. She has chills on and off, but no fever. She has had rare blood streaks in the stool, typically after multiple bowel movements. She reports a total of 3 to 7 bowel movements per day. She denies any recent use of antibiotics. No travel history. No change in diet or medications and no sick contacts. She had an EGD probably 5 or 6 years ago. She was evaluated by Dr. Jorje Mcdonough in December. At that time, she was given recommendations for symptomatic treatment for hemorrhoids. Stool studies were sent. Shigatoxin was negative. Stool cultures were negative. Rapid COVID testing was negative.   The patient had called because of her persistent abdominal pain and Dr. Layne Christianson recommended evaluation in the emergency room with admission for expedited EGD and colonoscopy. The patient had a CT scan of her abdomen and pelvis, which revealed a left adnexal cyst.  No other intra-abdominal process was identified. There was nonspecific gaseous distention of the rectum and rectosigmoid colon. The patient did state she had taken an enema prior to coming to the emergency room. There also appeared to be some tethering of the anterior uterine margin against the abdominopelvic wall associated with a Pfannenstiel incision, was likely felt to be secondary to underlying adhesions. Cholelithiasis was seen without CT evidence of cholecystitis. The patient was admitted and prep was ordered in the emergency room. Discharge Diagnoses:   1. Intractable abdominal pain  2. Nausea and vomiting  3. Dehydration  4. Cholelithiasis  5. Left Adnexal cyst    Hospital Course:  56 y/o female admitted with abdominal pain. Work up included EGD/colonoscopy, CT a/p and HIDA scan. She has cholelithiasis, no evidence of cholecystitis. No surgery at this time. She reports poor diet and stress induces her pain. Possible IBS. She follows Dr. June Vila outpatient. Instructed to f/u in 1-2 weeks. At discharge we discontinued dicyclomine as it causes nausea and ordered hyoscyamine, Zofran, ppi and norco prn. Discussed importance of good diet. She reports her anxiety is worse and she will f/u wit her PCP next week. She has L adnexal cyst: instructed to follow up with gyne.     Assessment & Plan:    *Intractable abd pain, N-V  -GI and Surgery on consult  S/p EGD/colonoscopy 2/5/22: colon polyps x 5, terminal ileal erosions, diverticulosis, duodenitis  -bx pending  -liver enzymes unremakable  -HIDA normal  Pt does have some adhesive disease and a L adnexal cyst, will consider Gyne eval if  Cont hyoscyamine prn(dicyclomine discontinued due to causing nausea)  S/p IV protonix, switched to po at discharge  S/p bowel rest, now improved  Zofran prn:  n-v resolved , ODT at discharge     *Cholelithiasis  CT a/p showed cholelithiasis  US Gallbladder showed diffuse wall thickening, 0.9cm stone neck of GB  Surgery on consult: no OR at this time, no evidence of cholecystitis  HIDA normal  -consider laparoscopic cholecystectomy in future     L Adnexal cyst  F./u Gyne outpatient     Anxiety   fluoxetine 40mg daily when taking po     Hypothyroidism  -synthroid     EXAM:   GENERAL: no apparent distress, comfortable  NEURO: A/A Ox3, no focal deficits  RESP: non labored, CTAB/L  CARDIO: Regular, no murmur  ABD: soft, NT, ND  EXTREMITIES: no edema, no calf tenderness    Operative Procedures: Procedure(s) (LRB):  ESOPHAGOGASTRODUODENOSCOPY (EGD) (N/A)  COLONOSCOPY (N/A)  Radiology:   NM GB HEPATOBILIARY SCAN WITH PHARMACY  (CXB=[id number])    Result Date: 2/7/2022  CONCLUSION: Normal Hepatobiliary study. There is a normal gallbladder ejection fraction response to fatty meal stimulation. The common bile and cystic ducts are patent. Dictated by (CST): Naren Bernardo MD on 2/07/2022 at 4:38 PM     Finalized by (CST): Naren Bernardo MD on 2/07/2022 at 4:40 PM          @Discharge Instructions@  Activity: activity as tolerated  Diet: regular diet  Wound Care: NA  Code Status: No Order    DISCHARGE MEDICATIONS     Discharge Medications        START taking these medications        Instructions Prescription details   HYDROcodone-acetaminophen  MG Tabs  Commonly known as: NORCO      Take 1 tablet by mouth every 6 (six) hours as needed. Quantity: 10 tablet  Refills: 0     hyoscyamine sulfate 0.125 MG Tbdp  Commonly known as: LEVSIN      Place 1 tablet (0.125 mg total) under the tongue every 4 (four) hours as needed. Quantity: 30 tablet  Refills: 0     ondansetron 4 MG Tbdp  Commonly known as: Zofran ODT      Take 1 tablet (4 mg total) by mouth every 8 (eight) hours as needed for Nausea.    Quantity: 20 tablet  Refills: 0     pantoprazole 40 MG Tbec  Commonly known as: PROTONIX  Start taking on: February 9, 2022      Take 1 tablet (40 mg total) by mouth every morning before breakfast.   Quantity: 30 tablet  Refills: 0     simethicone 80 MG Chew  Commonly known as: MYLICON      Chew 1 tablet (80 mg total) by mouth 4 (four) times daily with meals and nightly. Refills: 0            CHANGE how you take these medications        Instructions Prescription details   levothyroxine 88 MCG Tabs  What changed: See the new instructions. Take 1 tablet by mouth daily for 6 days per week , take 1.5 tablet by mouth x 1 day per week. Quantity: 135 tablet  Refills: 0            CONTINUE taking these medications        Instructions Prescription details   FLUoxetine HCl 40 MG Caps  Commonly known as: PROZAC      Take 1 capsule (40 mg total) by mouth daily. Quantity: 30 capsule  Refills: 5            STOP taking these medications      dicyclomine 10 MG Caps  Commonly known as: BENTYL                  Where to Get Your Medications        These medications were sent to [address] [place] [address] [address] [phone], [phone]  [facility] [address], [address] [place] [id number]      Phone: [phone]   HYDROcodone-acetaminophen  MG Tabs  hyoscyamine sulfate 0.125 MG Tbdp  ondansetron 4 MG Tbdp  pantoprazole 40 MG Tbec         Important follow up: Follow-up With  Details  Why  Contact Info   David Romero MD  In 1 week    [address] [facility],[address]  [facility] [address]  [phone]   Jarrod Martin MD  Schedule an appointment as soon as possible for a visit in 2 weeks    [phone] [facility]  [phone]     -PCP in [x] within 7 days [] within 14 days [] other   Disposition: home  Discharged Condition: good    Lace+ Score: 56  59-90 High Risk  29-58 Medium Risk  0-28   Low Risk.     TCM Follow-Up Recommendation:  LACE < 29: Low Risk of readmission after discharge from the hospital; Still recommend for TCM follow-up. Total Time Coordinating Care: Greater than 30 minutes  Patient had opportunity to ask questions, state understanding, and agree with therapeutic plan as outlined    [address]  [phone]  2/8/2022'  Is this a shared or split note between [id number] [facility] Provider and Physician? Yes     ATTENDING ADDENDUM  PT WAS SEEN AND EXAMINED INDEPENDENTLY  AGREE WITH ABOVE NOTE WITH ADDITIONAL COMMENTS BELOW    PT WAS SEEN AND EXAMINED  PAIN CONTROLLED, TOLERATING DIET  WANTS TO GO HOME    PLAN WILL BE TO DC PT HOME WITH CLOSE F/U WITH GI  CONT PRN HYOSCYAMINE, Bonnie Garcia AND Anel Delarosa MD, 02/08/22

## 2022-02-09 ENCOUNTER — PATIENT OUTREACH (OUTPATIENT)
Dept: CASE MANAGEMENT | Age: 47
End: 2022-02-09

## 2022-02-09 ENCOUNTER — TELEPHONE (OUTPATIENT)
Dept: INTERNAL MEDICINE CLINIC | Facility: CLINIC | Age: 47
End: 2022-02-09

## 2022-02-09 NOTE — TELEPHONE ENCOUNTER
Pt discharged 2-8-22, abdominal pain, acute. Pt does not have HFU appt scheduled at this time. Pt declined to schedule during HFU call. She continues to have abd pain and bloating; feels the same as she did in the hospital. Reviewed when she needs to return to ER and she understands. NCM also reviewed bland diet for now and to limit heavy/greasy foods (she ate half a cheeseburger the day she got home). HFU appt recommended by 2-15-22 as pt is a high risk for readmission. Please discuss with PCP and contact pt accordingly. Thank you!

## 2022-02-09 NOTE — TELEPHONE ENCOUNTER
I can see her my SDA- on 2/14/21- 1020 am unless Dr. Makenzie Horn can see her earlier. I have only done one telemed visit with her in 2020 and she is not well know to me.     Also agree, if not feeling better and with those complaints to return to ER

## 2022-02-10 ENCOUNTER — TELEPHONE (OUTPATIENT)
Dept: GASTROENTEROLOGY | Facility: CLINIC | Age: 47
End: 2022-02-10

## 2022-02-10 NOTE — TELEPHONE ENCOUNTER
Patient seen once only since Dr. Michelle Tristan left the clinic in 2020. Only had o ne physical exam in 2019. Will see if Dr. Maximilian Milton has anything open prior to Feb 14th, if not will see if any other clinician can take her on as a new patient as she really should re-establish.

## 2022-02-10 NOTE — TELEPHONE ENCOUNTER
LMTCB. Please transfer to triage. F/U visit made with Gómez Jensen  for 03/09/2022 at 11 am at the [facility] office. Please confirm when patient calls back. Thank you.

## 2022-02-10 NOTE — TELEPHONE ENCOUNTER
This pt was seen by Dr. Carlos Eduardo Payne in outpatient setting so will go back to her for outpatient follow up

## 2022-02-10 NOTE — TELEPHONE ENCOUNTER
Patient very concerned as her pain continues and the only medication that has been helping is the [address] told her NOT to take Ibuprofen and tylenol due to currently GI issues. Reinforced the vinay for her to eat bland, bland diet--no spices, no roughage, no fatty foods. She claims eating only soups right now. Informed patient that clinicians will not refill narcotic without seeing patient and she needs to re-establish. This requires a 40 minute visit. To see her just so he can refill the [place] today is not appropriate. Patient was not aware that another prescription was written for her yesterday: pantoprazole. She will  today. Explained to patient that, if she was only given 10 each, the clinician was not anticipating patient needing more than that. Patient was given a prescription for 10 Norco to be taken every 6 hours. This should last 2.5 to 3.5 days. Patient informed RN that she has been taking it every 4 hours; this is one reason she is out (6 per day instead of 3 to 4 per day). Concerned that physician will not refill the medication even if she does some in. Informed patient that it all depends on his evaluation of the situation. Also asked patient if she has made an appointment for GI. Stated not yet, but plans to call to set up. New Patient exam scheduled for 2/12/22, Saturday,  which also can serve as a HFU.

## 2022-02-10 NOTE — TELEPHONE ENCOUNTER
LVM on cell:   Please call clinic. We need to see after your hospitalization and to re-establish given your last physical was over 2 years go.

## 2022-02-10 NOTE — TELEPHONE ENCOUNTER
LVM on home phone:  Needs to be seen by someone in our clinic (re-establish). Should be a TCM after recent hospitalization. Please call us for appointment.

## 2022-02-10 NOTE — TELEPHONE ENCOUNTER
Hi thank you. I have never seen her in person. Did a virtual visit end of 12/20. She is seeing Dr Deyvi Santizo in our office this Saturday for hospital f/u. I will forward to him.

## 2022-02-11 NOTE — TELEPHONE ENCOUNTER
Spoke with [employer]. Verified . Reviewed Dr. Block Batch message below extensively which she verbalized understanding. Offered follow up with Dulce Maria Perdue 2022; which she declined stating this is too far out. She is not feeling well and will contact our office later to set up an alternative date for visit. Will await call back and/or follow up.

## 2022-02-12 ENCOUNTER — OFFICE VISIT (OUTPATIENT)
Dept: INTERNAL MEDICINE CLINIC | Facility: CLINIC | Age: 47
End: 2022-02-12
Payer: MEDICAID

## 2022-02-12 VITALS
DIASTOLIC BLOOD PRESSURE: 68 MMHG | HEIGHT: 57 IN | WEIGHT: 111 LBS | OXYGEN SATURATION: 97 % | BODY MASS INDEX: 23.95 KG/M2 | HEART RATE: 92 BPM | SYSTOLIC BLOOD PRESSURE: 100 MMHG

## 2022-02-12 DIAGNOSIS — F41.1 GENERALIZED ANXIETY DISORDER: ICD-10-CM

## 2022-02-12 DIAGNOSIS — F32.9 MAJOR DEPRESSIVE DISORDER, REMISSION STATUS UNSPECIFIED, UNSPECIFIED WHETHER RECURRENT: ICD-10-CM

## 2022-02-12 DIAGNOSIS — F17.210 CIGARETTE SMOKER: ICD-10-CM

## 2022-02-12 DIAGNOSIS — R10.84 GENERALIZED ABDOMINAL PAIN: Primary | ICD-10-CM

## 2022-02-12 DIAGNOSIS — E03.9 HYPOTHYROIDISM, UNSPECIFIED TYPE: ICD-10-CM

## 2022-02-12 DIAGNOSIS — E78.00 HYPERCHOLESTEROLEMIA: ICD-10-CM

## 2022-02-12 DIAGNOSIS — F41.0 PANIC DISORDER: ICD-10-CM

## 2022-02-12 DIAGNOSIS — N94.9 ADNEXAL CYST: ICD-10-CM

## 2022-02-12 PROBLEM — K57.92 ACUTE DIVERTICULITIS: Status: RESOLVED | Noted: 2022-02-03 | Resolved: 2022-02-12

## 2022-02-12 PROBLEM — F41.9 ANXIETY AND DEPRESSION: Status: RESOLVED | Noted: 2019-09-28 | Resolved: 2022-02-12

## 2022-02-12 PROBLEM — F32.A ANXIETY AND DEPRESSION: Status: RESOLVED | Noted: 2019-09-28 | Resolved: 2022-02-12

## 2022-02-12 PROBLEM — E78.49 FAMILIAL HYPERLIPIDEMIA, HIGH LDL: Status: RESOLVED | Noted: 2019-09-28 | Resolved: 2022-02-12

## 2022-02-12 PROCEDURE — 3008F BODY MASS INDEX DOCD: CPT | Performed by: FAMILY MEDICINE

## 2022-02-12 PROCEDURE — 99213 OFFICE O/P EST LOW 20 MIN: CPT | Performed by: FAMILY MEDICINE

## 2022-02-12 PROCEDURE — 3074F SYST BP LT 130 MM HG: CPT | Performed by: FAMILY MEDICINE

## 2022-02-12 PROCEDURE — 3078F DIAST BP <80 MM HG: CPT | Performed by: FAMILY MEDICINE

## 2022-02-12 RX ORDER — OMEPRAZOLE 40 MG/1
40 CAPSULE, DELAYED RELEASE ORAL DAILY
Qty: 90 CAPSULE | Refills: 0 | Status: SHIPPED | OUTPATIENT
Start: 2022-02-12

## 2022-02-12 RX ORDER — FLUOXETINE HYDROCHLORIDE 40 MG/1
40 CAPSULE ORAL DAILY
Qty: 90 CAPSULE | Refills: 3 | Status: SHIPPED | OUTPATIENT
Start: 2022-02-12

## 2022-02-12 RX ORDER — HYDROCODONE BITARTRATE AND ACETAMINOPHEN 10; 325 MG/1; MG/1
1 TABLET ORAL EVERY 6 HOURS PRN
Qty: 5 TABLET | Refills: 0 | Status: SHIPPED | OUTPATIENT
Start: 2022-02-12

## 2022-02-12 RX ORDER — HYDROXYZINE HYDROCHLORIDE 10 MG/1
10 TABLET, FILM COATED ORAL 3 TIMES DAILY PRN
Qty: 30 TABLET | Refills: 0 | Status: SHIPPED | OUTPATIENT
Start: 2022-02-12

## 2022-02-12 RX ORDER — ATORVASTATIN CALCIUM 10 MG/1
10 TABLET, FILM COATED ORAL NIGHTLY
Qty: 90 TABLET | Refills: 3 | Status: SHIPPED | OUTPATIENT
Start: 2022-02-12

## 2022-02-12 NOTE — ASSESSMENT & PLAN NOTE
Very anxious in the room. Restart fluoxetine 40mg daily. In the meantime, I will prescribe hydroxyzine 10 mg to use every 8 hours as needed. Referral to Fanny Ocampo provided for therapy referrals. I suspect that her anxiety and depression are strong contributors to her abdominal discomfort especially since the Valium that her  gave her helps relax her and made her feel more comfortable. Follow-up in 1 week given patient's level of anxiety.

## 2022-02-12 NOTE — ASSESSMENT & PLAN NOTE
History of significant elevated LDL. Should be on statin medication. Restart atorvastatin 10mg daily.

## 2022-02-12 NOTE — ASSESSMENT & PLAN NOTE
History of major depressive disorder, unclear if still persistent at this time. She recently has stopped her fluoxetine. I recommend that she restart her fluoxetine. Follow-up in 1 week to reassess.

## 2022-02-14 NOTE — TELEPHONE ENCOUNTER
Tentatively scheduled Ligia for cancellation on aprn schedule for next Monday, 02/21/2022 at 2:30 pm.     Spoke with Yasmeen Cancer and confirmed sooner availability works for her. Verified time, location and to arrive 15 minutes early. Patient expressed understanding with no further questions or concerns at this time.      Future Appointments   Date Time Provider Lyle Smith   2/21/2022  2:30 PM Ab Ahr, APRN [facility] SARA RENTERIA

## 2022-02-21 ENCOUNTER — TELEPHONE (OUTPATIENT)
Dept: GASTROENTEROLOGY | Facility: CLINIC | Age: 47
End: 2022-02-21

## 2022-02-21 NOTE — TELEPHONE ENCOUNTER
Nursing:  I am sorry to hear that she had car issues and was not able to keep her appointment. Unfortunately, I do think an in-person consult would be better given c/o abd pain. I cannot prescribe treatment before a visit. I would recommend f/u with me or Dr. Luis Alberto Cadena. I will watch my schedule for cancellations.     Thanks,  Rito Burrell

## 2022-02-21 NOTE — TELEPHONE ENCOUNTER
Spoke with [employer]. Relayed aprn response and recommendations based on patients wishes below. Offered next available for Wednesday, March 23rd at 11:00 am; [place] office. Accepted appointment date/time/location and requesting to be placed on cancellation list.     Verified time, location and to arrive 15 minutes early. Patient expressed understanding with no further questions or concerns at this time.      Future Appointments   Date Time Provider Lyle Smith   3/23/2022 11:00 AM Morteza Dailey

## 2022-02-21 NOTE — TELEPHONE ENCOUNTER
Ana Kirby--    Spoke with [employer]. Initially scheduled today at 2:30 pm for hospital follow up 2/2 bloating and abdominal pain. Demanding a phone visit since she is having car issues and doesn't see anytime in the near future to be able to come in. Reports increase in urgency/frequency in bowel movements but formed/normal looking. Pain located in right lower abdomen which is intermittent and described as \"strong and crampy\". Requesting to set up video visit with parvez and wants \"the pink pill for IBS prescribed\". Explained extensively that office visits are required prior to prescribing medications given evaluation by provider essential.     Still adamant to have a phone visit to discuss further. Please advise if phone/video visit can be accommodated and when/where may be added.      Thank you

## 2022-02-21 NOTE — TELEPHONE ENCOUNTER
Patient cancelled her appointment today at 2:30 pm due to transportation. Patient indicates she does not feel well, no further details. Please call at, [phone],[facility].

## 2022-03-23 ENCOUNTER — HOSPITAL ENCOUNTER (OUTPATIENT)
Dept: GENERAL RADIOLOGY | Age: 47
Discharge: HOME | End: 2022-03-23
Attending: NURSE PRACTITIONER
Payer: MEDICAID

## 2022-03-23 ENCOUNTER — OFFICE VISIT (OUTPATIENT)
Dept: GASTROENTEROLOGY | Facility: CLINIC | Age: 47
End: 2022-03-23
Payer: MEDICAID

## 2022-03-23 ENCOUNTER — LAB ENCOUNTER (OUTPATIENT)
Dept: LAB | Age: 47
End: 2022-03-23
Attending: NURSE PRACTITIONER
Payer: MEDICAID

## 2022-03-23 VITALS
SYSTOLIC BLOOD PRESSURE: 98 MMHG | BODY MASS INDEX: 22.65 KG/M2 | DIASTOLIC BLOOD PRESSURE: 66 MMHG | WEIGHT: 105 LBS | HEIGHT: 57 IN

## 2022-03-23 DIAGNOSIS — R19.4 ALTERED BOWEL HABITS: ICD-10-CM

## 2022-03-23 DIAGNOSIS — R19.4 FREQUENT BOWEL MOVEMENTS: ICD-10-CM

## 2022-03-23 DIAGNOSIS — R74.01 ELEVATED AST (SGOT): ICD-10-CM

## 2022-03-23 DIAGNOSIS — K80.20 CALCULUS OF GALLBLADDER WITHOUT CHOLECYSTITIS WITHOUT OBSTRUCTION: ICD-10-CM

## 2022-03-23 DIAGNOSIS — K62.89 RECTAL PAIN: Primary | ICD-10-CM

## 2022-03-23 DIAGNOSIS — K62.89 RECTAL PAIN: ICD-10-CM

## 2022-03-23 DIAGNOSIS — K62.89 ANAL OR RECTAL PAIN: Primary | ICD-10-CM

## 2022-03-23 PROCEDURE — 3078F DIAST BP <80 MM HG: CPT | Performed by: NURSE PRACTITIONER

## 2022-03-23 PROCEDURE — 83993 ASSAY FOR CALPROTECTIN FECAL: CPT

## 2022-03-23 PROCEDURE — 99215 OFFICE O/P EST HI 40 MIN: CPT | Performed by: NURSE PRACTITIONER

## 2022-03-23 PROCEDURE — 87493 C DIFF AMPLIFIED PROBE: CPT

## 2022-03-23 PROCEDURE — 3074F SYST BP LT 130 MM HG: CPT | Performed by: NURSE PRACTITIONER

## 2022-03-23 PROCEDURE — 3008F BODY MASS INDEX DOCD: CPT | Performed by: NURSE PRACTITIONER

## 2022-03-23 PROCEDURE — 74018 RADEX ABDOMEN 1 VIEW: CPT | Performed by: NURSE PRACTITIONER

## 2022-03-23 NOTE — PATIENT INSTRUCTIONS
-start fibercon or citrucel once daily in evening  -miralax in am  -squatty potty  -pelvic floor dysfunction physical therapy  -small portions  -avoid fatty/greasy food  -monitor labs for need for further work-up  -er if condition decline  -labs  -stool testing

## 2022-03-24 ENCOUNTER — TELEPHONE (OUTPATIENT)
Dept: GASTROENTEROLOGY | Facility: CLINIC | Age: 47
End: 2022-03-24

## 2022-03-24 LAB
ABSOLUTE BASOPHILS: 38 CELLS/UL (ref 0–200)
ABSOLUTE EOSINOPHILS: 211 CELLS/UL (ref 15–500)
ABSOLUTE LYMPHOCYTES: 2182 CELLS/UL (ref 850–3900)
ABSOLUTE MONOCYTES: 352 CELLS/UL (ref 200–950)
ABSOLUTE NEUTROPHILS: 3616 CELLS/UL (ref 1500–7800)
ALBUMIN/GLOBULIN RATIO: 1.8 (CALC) (ref 1–2.5)
ALBUMIN: 4.5 G/DL (ref 3.6–5.1)
ALKALINE PHOSPHATASE: 63 U/L (ref 31–125)
ALT: 8 U/L (ref 6–29)
AST: 14 U/L (ref 10–35)
BASOPHILS: 0.6 %
BILIRUBIN, DIRECT: 0.1 MG/DL
BILIRUBIN, INDIRECT: 0.5 MG/DL (CALC) (ref 0.2–1.2)
BILIRUBIN, TOTAL: 0.6 MG/DL (ref 0.2–1.2)
C DIFF TOX B STL QL: NEGATIVE
EOSINOPHILS: 3.3 %
GLOBULIN: 2.5 G/DL (CALC) (ref 1.9–3.7)
HEMATOCRIT: 41.5 % (ref 35–45)
HEMOGLOBIN: 13.8 G/DL (ref 11.7–15.5)
LYMPHOCYTES: 34.1 %
MCH: 29.2 PG (ref 27–33)
MCHC: 33.3 G/DL (ref 32–36)
MCV: 87.7 FL (ref 80–100)
MONOCYTES: 5.5 %
MPV: 10.3 FL (ref 7.5–12.5)
NEUTROPHILS: 56.5 %
PLATELET COUNT: 364 THOUSAND/UL (ref 140–400)
PROTEIN, TOTAL: 7 G/DL (ref 6.1–8.1)
RDW: 12.2 % (ref 11–15)
RED BLOOD CELL COUNT: 4.73 MILLION/UL (ref 3.8–5.1)
WHITE BLOOD CELL COUNT: 6.4 THOUSAND/UL (ref 3.8–10.8)

## 2022-03-24 NOTE — TELEPHONE ENCOUNTER
Patient was seen in clinic yesterday and indicates she is awaiting a call to discuss a medication. Please call at [phone],[facility].

## 2022-03-25 NOTE — TELEPHONE ENCOUNTER
Jeet Anderson seen in clinic for hospital follow up 03/23/2022. Reported rectal pain with urgencies & irregular bm's with blood/mucos occasionally. Recommended planning for KUB, labs & stool testing. Consideration linzess rx pending results. KUB showed normal findings. C diff negative & labs within normal value. Please advise on constipation rx therapy if indicated based on work up. Ligia called in to discuss starting IBS medication. Thank you!

## 2022-03-25 NOTE — TELEPHONE ENCOUNTER
Pt called and states that she is still having pain and discomfort and would like to speak to RN. Please call.

## 2022-03-25 NOTE — TELEPHONE ENCOUNTER
APRN response for further advisement once back in office next week. Spoke with Mariely Burgos to address current symptoms and review recommendations provided at tov. Endorses abdominal cramping/pain which is persisting and \"getting worse\". Two bm's so far today. Denies blood or mucous. States she is not constipated and just \"needs a medication to fix her IBS\". Recommended miralax along with taking fiber supplement daily as may help alleviate symptoms. Declined and not open to trying otc products. Advised portion control & complete avoidance of fatty/greasy foods. States she's barely eating full meals due to bloating. Assured KUB reassuring and labs unremarkable. If symptoms worsen and/or decline, stressed the importance reporting to ED. Pending Lauren's input on further recommendations (IBS therapy options) upon return to office.

## 2022-03-28 NOTE — TELEPHONE ENCOUNTER
Pt contacted by sekou  She says has tried miralax, fiber, bentyl, hyoscyamine w/o improvement. Having daily bm with straining and incomplete evacuation. Has rectal pressure prior to bm. Referred to pt after last clinic visit. Advised she schedule appt. Discussed with pt that rx for constipation would likely result diarrhea and do not think appropriate. Recommended fiber to help improve quality of bm. Advised restarting bentyl vs hyoscyamine on scheduled basis. Avoidance of diet, environmental triggers (stress). No significant bloating, gas and do not think rifaximin appropriate at present given reported symptoms. Currently on fluoxetine and could consider TCA trial with prescriber.     Patient hung up on APN

## 2022-03-29 LAB — CALPROTECTIN, FECAL: 26 UG/G

## 2022-05-23 ENCOUNTER — OFFICE VISIT (OUTPATIENT)
Dept: PHYSICAL THERAPY | Age: 47
End: 2022-05-23
Attending: NURSE PRACTITIONER
Payer: MEDICAID

## 2022-05-23 DIAGNOSIS — R19.4 ALTERED BOWEL HABITS: ICD-10-CM

## 2022-05-23 DIAGNOSIS — K62.89 RECTAL PAIN: ICD-10-CM

## 2022-05-23 PROCEDURE — 97112 NEUROMUSCULAR REEDUCATION: CPT

## 2022-05-23 PROCEDURE — 97162 PT EVAL MOD COMPLEX 30 MIN: CPT

## 2022-05-23 PROCEDURE — 97110 THERAPEUTIC EXERCISES: CPT

## 2022-06-01 ENCOUNTER — APPOINTMENT (OUTPATIENT)
Dept: PHYSICAL THERAPY | Age: 47
End: 2022-06-01
Attending: NURSE PRACTITIONER
Payer: MEDICAID

## 2022-06-06 ENCOUNTER — TELEPHONE (OUTPATIENT)
Dept: ENDOCRINOLOGY CLINIC | Facility: CLINIC | Age: 47
End: 2022-06-06

## 2022-06-06 RX ORDER — LEVOTHYROXINE SODIUM 88 UG/1
TABLET ORAL
Qty: 135 TABLET | Refills: 0 | Status: SHIPPED | OUTPATIENT
Start: 2022-06-06

## 2022-06-06 NOTE — TELEPHONE ENCOUNTER
LOV 09/07/21. Unspecified rtc. Per TE 09/28/21  \"Repeat labs in 2-3 months and FU in clinic\"  Called to schedule first available. Patient reports she will get labs drawn first, states she will go to lab today. Will check for results at later time.

## 2022-06-06 NOTE — TELEPHONE ENCOUNTER
rn called patient , states she is taking levothyroxine 88 mcg daily and 1.5 tab once a week  States she will go get blood work done

## 2022-06-08 ENCOUNTER — TELEPHONE (OUTPATIENT)
Dept: PHYSICAL THERAPY | Facility: HOSPITAL | Age: 47
End: 2022-06-08

## 2022-06-08 ENCOUNTER — APPOINTMENT (OUTPATIENT)
Dept: PHYSICAL THERAPY | Age: 47
End: 2022-06-08
Attending: NURSE PRACTITIONER
Payer: MEDICAID

## 2022-06-15 ENCOUNTER — OFFICE VISIT (OUTPATIENT)
Dept: PHYSICAL THERAPY | Age: 47
End: 2022-06-15
Attending: NURSE PRACTITIONER
Payer: MEDICAID

## 2022-06-15 DIAGNOSIS — K62.89 RECTAL PAIN: ICD-10-CM

## 2022-06-15 DIAGNOSIS — R19.4 ALTERED BOWEL HABITS: ICD-10-CM

## 2022-06-15 PROCEDURE — 97140 MANUAL THERAPY 1/> REGIONS: CPT

## 2022-06-22 ENCOUNTER — APPOINTMENT (OUTPATIENT)
Dept: PHYSICAL THERAPY | Age: 47
End: 2022-06-22
Attending: NURSE PRACTITIONER
Payer: MEDICAID

## 2022-06-29 ENCOUNTER — LAB ENCOUNTER (OUTPATIENT)
Dept: LAB | Facility: HOSPITAL | Age: 47
End: 2022-06-29
Attending: INTERNAL MEDICINE
Payer: MEDICAID

## 2022-06-29 DIAGNOSIS — E55.9 VITAMIN D DEFICIENCY: ICD-10-CM

## 2022-06-29 DIAGNOSIS — R14.0 BLOATING: ICD-10-CM

## 2022-06-29 DIAGNOSIS — E03.9 HYPOTHYROIDISM, UNSPECIFIED TYPE: ICD-10-CM

## 2022-06-29 LAB
IGA SERPL-MCNC: 118 MG/DL (ref 70–312)
T4 FREE SERPL-MCNC: 1.5 NG/DL (ref 0.8–1.7)
TSI SER-ACNC: 0.52 MIU/ML (ref 0.36–3.74)
VIT D+METAB SERPL-MCNC: 20.2 NG/ML (ref 30–100)

## 2022-06-29 PROCEDURE — 84439 ASSAY OF FREE THYROXINE: CPT

## 2022-06-29 PROCEDURE — 84443 ASSAY THYROID STIM HORMONE: CPT

## 2022-06-29 PROCEDURE — 86364 TISS TRNSGLTMNASE EA IG CLAS: CPT

## 2022-06-29 PROCEDURE — 82306 VITAMIN D 25 HYDROXY: CPT

## 2022-06-29 PROCEDURE — 36415 COLL VENOUS BLD VENIPUNCTURE: CPT

## 2022-06-29 PROCEDURE — 82784 ASSAY IGA/IGD/IGG/IGM EACH: CPT

## 2022-07-01 ENCOUNTER — TELEPHONE (OUTPATIENT)
Dept: ENDOCRINOLOGY CLINIC | Facility: CLINIC | Age: 47
End: 2022-07-01

## 2022-07-01 LAB — TTG IGA SER-ACNC: 0.3 U/ML (ref ?–7)

## 2022-07-01 NOTE — TELEPHONE ENCOUNTER
Received labs  No apt since Sep 2021  Please book VV next week to review results   Okay to add on at my lunch time  Thanks

## 2022-07-05 NOTE — TELEPHONE ENCOUNTER
Spoke to patient - VV scheduled 7/12/22 Tobacco use: 1pack x "long time"  EtOH use: Denies   Illicit drug use: Denies recent use of any narcotics

## 2022-07-06 ENCOUNTER — APPOINTMENT (OUTPATIENT)
Dept: PHYSICAL THERAPY | Age: 47
End: 2022-07-06
Attending: NURSE PRACTITIONER
Payer: MEDICAID

## 2022-07-12 ENCOUNTER — TELEPHONE (OUTPATIENT)
Dept: ENDOCRINOLOGY CLINIC | Facility: CLINIC | Age: 47
End: 2022-07-12

## 2022-07-12 NOTE — TELEPHONE ENCOUNTER
Rescheduled for 08/12/22. Patient wanted to know if there is anything she has to do based on her recent lab test, Please advise.

## 2022-07-12 NOTE — TELEPHONE ENCOUNTER
link for vv was sent, but patient did not join the VV  Please call and schedule to another day  Thanks

## 2022-07-12 NOTE — TELEPHONE ENCOUNTER
CPM for thyroid  Vit D is low: ergocalciferol 50,000 units q week for the next 8 weeks, followed by OTC vit D 2000 units daily  Will repeat labs at [facility]

## 2022-07-13 ENCOUNTER — APPOINTMENT (OUTPATIENT)
Dept: PHYSICAL THERAPY | Age: 47
End: 2022-07-13
Attending: NURSE PRACTITIONER
Payer: MEDICAID

## 2022-07-13 RX ORDER — ERGOCALCIFEROL (VITAMIN D2) 1250 MCG
50000 CAPSULE ORAL WEEKLY
Qty: 10 CAPSULE | Refills: 0 | Status: SHIPPED | OUTPATIENT
Start: 2022-07-13

## 2022-07-20 ENCOUNTER — APPOINTMENT (OUTPATIENT)
Dept: PHYSICAL THERAPY | Age: 47
End: 2022-07-20
Attending: NURSE PRACTITIONER
Payer: MEDICAID

## 2022-07-28 ENCOUNTER — APPOINTMENT (OUTPATIENT)
Dept: PHYSICAL THERAPY | Age: 47
End: 2022-07-28
Attending: NURSE PRACTITIONER
Payer: MEDICAID

## 2022-08-03 ENCOUNTER — APPOINTMENT (OUTPATIENT)
Dept: PHYSICAL THERAPY | Age: 47
End: 2022-08-03
Attending: NURSE PRACTITIONER
Payer: MEDICAID

## 2022-08-12 ENCOUNTER — TELEMEDICINE (OUTPATIENT)
Dept: ENDOCRINOLOGY CLINIC | Facility: CLINIC | Age: 47
End: 2022-08-12

## 2022-08-12 DIAGNOSIS — E55.9 VITAMIN D DEFICIENCY: Primary | ICD-10-CM

## 2022-08-12 DIAGNOSIS — E04.1 THYROID NODULE: ICD-10-CM

## 2022-08-12 DIAGNOSIS — E03.9 HYPOTHYROIDISM, UNSPECIFIED TYPE: ICD-10-CM

## 2022-08-12 PROCEDURE — 99213 OFFICE O/P EST LOW 20 MIN: CPT | Performed by: INTERNAL MEDICINE

## 2022-08-12 RX ORDER — LEVOTHYROXINE SODIUM 88 MCG
88 TABLET ORAL
Qty: 90 TABLET | Refills: 0 | Status: SHIPPED | OUTPATIENT
Start: 2022-08-12

## 2022-08-15 ENCOUNTER — TELEPHONE (OUTPATIENT)
Dept: ENDOCRINOLOGY CLINIC | Facility: CLINIC | Age: 47
End: 2022-08-15

## 2022-08-16 ENCOUNTER — OFFICE VISIT (OUTPATIENT)
Dept: OBGYN CLINIC | Facility: CLINIC | Age: 47
End: 2022-08-16
Payer: MEDICAID

## 2022-08-16 VITALS
SYSTOLIC BLOOD PRESSURE: 115 MMHG | WEIGHT: 99 LBS | HEART RATE: 76 BPM | BODY MASS INDEX: 21 KG/M2 | DIASTOLIC BLOOD PRESSURE: 72 MMHG

## 2022-08-16 DIAGNOSIS — G89.29 CHRONIC RLQ PAIN: Primary | ICD-10-CM

## 2022-08-16 DIAGNOSIS — R10.31 CHRONIC RLQ PAIN: Primary | ICD-10-CM

## 2022-08-16 DIAGNOSIS — N83.209 CYST OF OVARY, UNSPECIFIED LATERALITY: ICD-10-CM

## 2022-08-16 PROCEDURE — 3074F SYST BP LT 130 MM HG: CPT | Performed by: OBSTETRICS & GYNECOLOGY

## 2022-08-16 PROCEDURE — 3078F DIAST BP <80 MM HG: CPT | Performed by: OBSTETRICS & GYNECOLOGY

## 2022-08-16 PROCEDURE — 99203 OFFICE O/P NEW LOW 30 MIN: CPT | Performed by: OBSTETRICS & GYNECOLOGY

## 2022-08-17 NOTE — TELEPHONE ENCOUNTER
Medication PA Requested:               Synthroid 88 mcg                                           CoverMyMeds Used:  Key:  Quantity: 30  Day Supply: 30   Sig: take 1 tablet daily  DX Code:                                       T/f levothyroxine   No improvement of symptoms on generic     Faxed [employer] PA form with LOV 8/12, lab 6/29/2022  Awaiting determination.

## 2022-08-19 NOTE — TELEPHONE ENCOUNTER
Approval for synthroid 88 mcg received. Valid from 5/19/22 to 8/17/23. Case #[id number]. [facility] sent informing patient.

## 2022-11-30 NOTE — TELEPHONE ENCOUNTER
Patient requesting to be transferred to nurse to review results, please call at [phone],ARIELLEZQANTONIO. No

## 2022-12-06 RX ORDER — LEVOTHYROXINE SODIUM 88 MCG
TABLET ORAL
Qty: 90 TABLET | Refills: 0 | Status: SHIPPED | OUTPATIENT
Start: 2022-12-06

## 2023-05-25 NOTE — TELEPHONE ENCOUNTER
Late entry: Discussed with patient that she should repeat labs, then we will call her and let her know if earlier appt is necessary. Per chart review she does have consult with integrative med scheduled 11/21/19. 4/27/2023

## 2023-06-26 NOTE — TELEPHONE ENCOUNTER
Spoke with patient and informed her of normal results. When she called on 1/29 she was symptomatic with shaky hands, nervousness and sweating. She was instructed to take 1/2 tablet of her 88mcg until she did labs.  She has been taking 1/2 pill for about 3-4 Minoxidil Counseling: Minoxidil is a topical medication which can increase blood flow where it is applied. It is uncertain how this medication increases hair growth. Side effects are uncommon and include stinging and allergic reactions.

## 2023-07-18 ENCOUNTER — TELEPHONE (OUTPATIENT)
Dept: ENDOCRINOLOGY CLINIC | Facility: CLINIC | Age: 48
End: 2023-07-18

## 2023-07-18 NOTE — TELEPHONE ENCOUNTER
PRIOR AUTH STARTED FOR:    SYNTHROID 88 MCG Oral Tab, Take one tablet by mouth before breakfast., Disp: 90 tablet, Rfl: 0    KEY: [id number]

## 2023-07-19 NOTE — TELEPHONE ENCOUNTER
Medication PA Requested:  Synthroid 88mcg oral tab                                                       CoverMyMeds Used:  Yes  Key: [id number]   Quantity: 90  Day Supply: 90  Sig: Take one tablet by mouth before breakfast.   DX Code:   E03.9

## 2023-07-20 NOTE — TELEPHONE ENCOUNTER
Medication PA Requested:  Synthroid 88mcg oral tab                                                       CoverMyMeds Used:  Yes  Key: [id number]   Quantity: 90  Day Supply: 90  Sig: Take one tablet by mouth before breakfast.   DX Code:   E03.9                        Epa submitted  with LOV, TSH  Awaiting determination

## 2023-07-20 NOTE — TELEPHONE ENCOUNTER
Received fax from [address] [place] [address] dated on 07/20/23 stating the SYNTHROID 88MCG TABLET has been approved and valid on 08/18/2023 and ends 08/18/2024.     #[id number]  [id number]

## 2023-07-25 ENCOUNTER — TELEPHONE (OUTPATIENT)
Dept: ENDOCRINOLOGY CLINIC | Facility: CLINIC | Age: 48
End: 2023-07-25

## 2023-07-25 NOTE — TELEPHONE ENCOUNTER
Patient is calling for an appt as soon as possible but in the afternoon. States her pcp checked her thyroid levels and came back abnormal again.  Please call

## 2023-07-25 NOTE — TELEPHONE ENCOUNTER
Dr. Rubina Linares    Please advise. LOV was 8/12/22 - patient was to return in 9 months. Unable to get recent thyroid labs via epic. Do you want to overbook patient?

## 2023-07-26 ENCOUNTER — TELEPHONE (OUTPATIENT)
Dept: ENDOCRINOLOGY CLINIC | Facility: CLINIC | Age: 48
End: 2023-07-26

## 2023-07-26 NOTE — TELEPHONE ENCOUNTER
Patient indicates she does not feel week, hands/feet are numb and does not feel well. Please call at [phone], thanks.

## 2023-07-27 NOTE — TELEPHONE ENCOUNTER
Dr. Eleanor Gutierrez,     Please advise if patient can be seen sooner. States not feeling well and even with dosage change per PCP, it is not helping. Patient aware that you are not in clinic today and will await call tomorrow. Symptoms: weak, heart pounding, hands and feet get numb.      Spoke to PCP 2 weeks ago, changed dose Synthroid 75mcg     First available: 11/2023    VV: 8/12/22  RTC: 9 months

## 2023-07-28 NOTE — TELEPHONE ENCOUNTER
Can add on in the next one month at lunch, preferably on a monday  Can also offer the wait list   Thanks

## 2023-08-04 ENCOUNTER — OFFICE VISIT (OUTPATIENT)
Dept: ENDOCRINOLOGY CLINIC | Facility: CLINIC | Age: 48
End: 2023-08-04

## 2023-08-04 ENCOUNTER — LAB ENCOUNTER (OUTPATIENT)
Dept: LAB | Facility: HOSPITAL | Age: 48
End: 2023-08-04
Attending: INTERNAL MEDICINE
Payer: MEDICAID

## 2023-08-04 ENCOUNTER — PATIENT MESSAGE (OUTPATIENT)
Dept: ENDOCRINOLOGY CLINIC | Facility: CLINIC | Age: 48
End: 2023-08-04

## 2023-08-04 VITALS
SYSTOLIC BLOOD PRESSURE: 130 MMHG | HEIGHT: 57 IN | HEART RATE: 60 BPM | WEIGHT: 92 LBS | BODY MASS INDEX: 19.85 KG/M2 | DIASTOLIC BLOOD PRESSURE: 78 MMHG

## 2023-08-04 DIAGNOSIS — E55.9 VITAMIN D DEFICIENCY: ICD-10-CM

## 2023-08-04 DIAGNOSIS — E03.9 HYPOTHYROIDISM, UNSPECIFIED TYPE: Primary | ICD-10-CM

## 2023-08-04 DIAGNOSIS — E03.9 HYPOTHYROIDISM, UNSPECIFIED TYPE: ICD-10-CM

## 2023-08-04 DIAGNOSIS — E04.1 THYROID NODULE: ICD-10-CM

## 2023-08-04 LAB
T3FREE SERPL-MCNC: 2.39 PG/ML (ref 2.4–4.2)
T4 FREE SERPL-MCNC: 1.3 NG/DL (ref 0.8–1.7)
THYROPEROXIDASE AB SERPL-ACNC: 615 U/ML (ref ?–60)
TSI SER-ACNC: 0.97 MIU/ML (ref 0.36–3.74)
VIT D+METAB SERPL-MCNC: 51.9 NG/ML (ref 30–100)

## 2023-08-04 PROCEDURE — 84481 FREE ASSAY (FT-3): CPT

## 2023-08-04 PROCEDURE — 3075F SYST BP GE 130 - 139MM HG: CPT | Performed by: INTERNAL MEDICINE

## 2023-08-04 PROCEDURE — 84439 ASSAY OF FREE THYROXINE: CPT

## 2023-08-04 PROCEDURE — 84443 ASSAY THYROID STIM HORMONE: CPT

## 2023-08-04 PROCEDURE — 3078F DIAST BP <80 MM HG: CPT | Performed by: INTERNAL MEDICINE

## 2023-08-04 PROCEDURE — 3008F BODY MASS INDEX DOCD: CPT | Performed by: INTERNAL MEDICINE

## 2023-08-04 PROCEDURE — 99213 OFFICE O/P EST LOW 20 MIN: CPT | Performed by: INTERNAL MEDICINE

## 2023-08-04 PROCEDURE — 82306 VITAMIN D 25 HYDROXY: CPT

## 2023-08-04 PROCEDURE — 86376 MICROSOMAL ANTIBODY EACH: CPT

## 2023-08-04 PROCEDURE — 36415 COLL VENOUS BLD VENIPUNCTURE: CPT

## 2023-08-04 RX ORDER — LEVOTHYROXINE SODIUM 0.07 MG/1
75 TABLET ORAL
COMMUNITY

## 2023-08-04 NOTE — PROGRESS NOTES
Return Office Visit     CHIEF COMPLAINT:    Hypothyroidism   Vitamin D deficiency    HISTORY OF PRESENT ILLNESS:  Rosa Elena Condon is a 52year old female who presents for follow up for hypothyroidism. She was diagnosed with subclinical hyperthyroidism. She is s/p POTTS in 8/2018. She was started on LT 4  She has been on LT4 75 mcg daily, states dose for changed about 6 weeks ago    ROS as below  Reports fatigue and body aches and joint pains    CURRENT MEDICATION:    Current Outpatient Medications   Medication Sig Dispense Refill    levothyroxine 75 MCG Oral Tab Take 1 tablet (75 mcg total) by mouth before breakfast.      ergocalciferol 1.25 MG (69094 UT) Oral Cap Take 1 capsule (50,000 Units total) by mouth once a week. 10 capsule 0         ALLERGY:    Morphine                HIVES, SWELLING    PAST MEDICAL, SOCIAL AND FAMILY HISTORY:  See past medical history marked as reviewed. See past surgical history marked as reviewed. See past family history marked as reviewed. See past social history marked as reviewed. ASSESSMENTS:     REVIEW OF SYSTEMS:  Constitutional: Negative for:  fever, + fatigue, cold/heat intolerance,   Eyes: Negative for:  Visual changes, proptosis, blurring  ENT: Negative for:  dysphagia, neck swelling, dysphonia  Respiratory: Negative for:  dyspnea, cough  Cardiovascular: Negative for:  chest pain, palpitations, orthopnea  GI: Negative for:  abdominal pain, nausea, vomiting, + IBS, bleeding  Neurology: Negative for: headache, numbness, weakness  Genito-Urinary: Negative for: dysuria, frequency  Psychiatric: Negative for:  depression, + chronic anxiety  Hematology/Lymphatics: Negative for: bruising, lower extremity edema  Endocrine: Negative for: polyuria, polydypsia  Skin: Negative for: rash, blister, cellulitis,       PHYSICAL EXAM:         General Appearance:  alert, well developed, in no acute distress  Head: Atraumatic  Eyes:  normal conjunctivae, sclera. , normal sclera and normal pupils  Throat/Neck: normal sound to voice. Normal hearing, normal speech  Respiratory:  Speaking in full sentences, non-labored. no increased work of breathing, no audible wheezing    Psychiatric:  oriented to time, self, and place  Extremities: no obvious extremity swelling, no lesions        DATA:     Pertinent data reviewed    ASSESSMENT AND PLAN:    Patient is a 52year old female with h/o subclinical hyperthyroidism. She is s/p POTTS. She is on LT4 replacement. Clinically reports fatigue and generalized joint pains  Her dose was decreased about 5 weeks ago discussed to wait another 1-2 weeks to get labs  However, patient will like to do these today given ongoing symptoms  She will like extensive testing which has been ordered per her request   I did recommend that she heck coverage with her insurance  I also explained that TSH is the most sensitive marker of thyroid function and that once positive the TPO AB stays positive  I also gave information for rheumatologist given symptoms of joint pains and chronic fatigue    PLAN:   LT4 75 mcg daily for now  Labs ordered  JESÚS signed to get recent lab results    The patient is aware that she needs to take LT 4 for the rest of her life; every am one hour before breakfast and atleast four hours apart from other meds especially calcium, iron, multivitamins containing Ca/iron  Stressed the importance of compliance with meds  Side effects of noncompliance including the development of myxedema coma and death discussed.     She has a thyroid remnant and two sub cm thyroid nodules ( left sided) on US from 3/2019  We will follow with US   Re ordered    H/o Vitamin D deficiency  She is now on weekly replacement for the last three weeks  Will check vit D  Can switch to monthly replacement/ vit D 2000 units daily after that        RTC in 9 months  Call for results    Orders Placed This Encounter      Assay, Thyroid Stim Hormone      Free T3 (Triiodothryronine)      Free T4, (Free Thyroxine)      Thyroid Peroxidase (TPO) AB      Vitamin D [E]      Karie Archibald MD

## 2023-08-04 NOTE — PATIENT INSTRUCTIONS
Yara Chopra MD  Rheumatologist        Damion Ibarra MD  Rheumatologist     [address] [facility], [place]      [phone]

## 2023-08-05 ENCOUNTER — PATIENT MESSAGE (OUTPATIENT)
Dept: ENDOCRINOLOGY CLINIC | Facility: CLINIC | Age: 48
End: 2023-08-05

## 2023-08-07 ENCOUNTER — TELEPHONE (OUTPATIENT)
Dept: ENDOCRINOLOGY CLINIC | Facility: CLINIC | Age: 48
End: 2023-08-07

## 2023-08-07 NOTE — TELEPHONE ENCOUNTER
During patients OV. JESÚS was filled out and faxed over to Pro Christianson MD at [facility] at [phone]. Received Confirmation: Success. Fax sent to scanning. Awaiting lab results.

## 2023-08-07 NOTE — TELEPHONE ENCOUNTER
Patient calling states was informed by Rheumatology if Dr Krystian Barbosa informed the department that patient needs to be seen sooner, so patient can have an earlier appointment than what is scheduled (11/1/23).

## 2023-08-10 NOTE — TELEPHONE ENCOUNTER
Yes please can request rheumatology to see her sooner if possible  She reports sever fatigue and generalized body pain   Can let the patient know that we will send a request   Thanks

## 2023-08-10 NOTE — TELEPHONE ENCOUNTER
Rheumatology staff, can patient be seen sooner than November for consult? See Dr. Yesenia Jackson note below.

## 2023-08-10 NOTE — TELEPHONE ENCOUNTER
Please see below and advise. Sent to both provider to discuss.       Future Appointments   Date Time Provider Lyle Smith   11/1/2023  2:00 PM Vandana Almeida MD 2014 [facility]

## 2023-08-14 NOTE — TELEPHONE ENCOUNTER
Pt contacted and given sooner appointment.       Future Appointments   Date Time Provider Lyle Smith   8/21/2023  2:00 PM Radha Baig MD 2014 [facility]

## 2023-08-16 ENCOUNTER — APPOINTMENT (OUTPATIENT)
Dept: MRI IMAGING | Facility: HOSPITAL | Age: 48
End: 2023-08-16
Attending: EMERGENCY MEDICINE
Payer: MEDICAID

## 2023-08-16 ENCOUNTER — HOSPITAL ENCOUNTER (INPATIENT)
Facility: HOSPITAL | Age: 48
LOS: 4 days | Discharge: HOME | End: 2023-08-20
Attending: EMERGENCY MEDICINE | Admitting: HOSPITALIST
Payer: MEDICAID

## 2023-08-16 DIAGNOSIS — K80.50 CHOLEDOCHOLITHIASIS: Primary | ICD-10-CM

## 2023-08-16 DIAGNOSIS — K81.9 CHOLECYSTITIS: ICD-10-CM

## 2023-08-16 LAB
ALBUMIN SERPL-MCNC: 3.4 G/DL (ref 3.4–5)
ALBUMIN SERPL-MCNC: 3.4 G/DL (ref 3.4–5)
ALBUMIN/GLOB SERPL: 1 {RATIO} (ref 1–2)
ALP LIVER SERPL-CCNC: 124 U/L
ALP LIVER SERPL-CCNC: 125 U/L
ALT SERPL-CCNC: 287 U/L
ALT SERPL-CCNC: 290 U/L
ANION GAP SERPL CALC-SCNC: 5 MMOL/L (ref 0–18)
AST SERPL-CCNC: 225 U/L (ref 15–37)
AST SERPL-CCNC: 229 U/L (ref 15–37)
B-HCG UR QL: NEGATIVE
BASOPHILS # BLD AUTO: 0.03 X10(3) UL (ref 0–0.2)
BASOPHILS NFR BLD AUTO: 0.5 %
BILIRUB DIRECT SERPL-MCNC: 0.1 MG/DL (ref 0–0.2)
BILIRUB SERPL-MCNC: 0.5 MG/DL (ref 0.1–2)
BILIRUB SERPL-MCNC: 0.5 MG/DL (ref 0.1–2)
BILIRUB UR QL: NEGATIVE
BUN BLD-MCNC: 5 MG/DL (ref 7–18)
BUN/CREAT SERPL: 7.4 (ref 10–20)
CALCIUM BLD-MCNC: 8.6 MG/DL (ref 8.5–10.1)
CHLORIDE SERPL-SCNC: 106 MMOL/L (ref 98–112)
CLARITY UR: CLEAR
CO2 SERPL-SCNC: 29 MMOL/L (ref 21–32)
COLOR UR: YELLOW
CREAT BLD-MCNC: 0.68 MG/DL
DEPRECATED RDW RBC AUTO: 41.4 FL (ref 35.1–46.3)
EGFRCR SERPLBLD CKD-EPI 2021: 108 ML/MIN/1.73M2 (ref 60–?)
EOSINOPHIL # BLD AUTO: 0.12 X10(3) UL (ref 0–0.7)
EOSINOPHIL NFR BLD AUTO: 2 %
ERYTHROCYTE [DISTWIDTH] IN BLOOD BY AUTOMATED COUNT: 12.7 % (ref 11–15)
GLOBULIN PLAS-MCNC: 3.5 G/DL (ref 2.8–4.4)
GLUCOSE BLD-MCNC: 77 MG/DL (ref 70–99)
GLUCOSE UR-MCNC: NORMAL MG/DL
HCT VFR BLD AUTO: 36.6 %
HGB BLD-MCNC: 12.1 G/DL
HGB UR QL STRIP.AUTO: NEGATIVE
IMM GRANULOCYTES # BLD AUTO: 0.02 X10(3) UL (ref 0–1)
IMM GRANULOCYTES NFR BLD: 0.3 %
LACTATE SERPL-SCNC: 0.8 MMOL/L (ref 0.4–2)
LEUKOCYTE ESTERASE UR QL STRIP.AUTO: NEGATIVE
LIPASE SERPL-CCNC: 22 U/L (ref 13–75)
LYMPHOCYTES # BLD AUTO: 1.76 X10(3) UL (ref 1–4)
LYMPHOCYTES NFR BLD AUTO: 29.9 %
MCH RBC QN AUTO: 29.2 PG (ref 26–34)
MCHC RBC AUTO-ENTMCNC: 33.1 G/DL (ref 31–37)
MCV RBC AUTO: 88.4 FL
MONOCYTES # BLD AUTO: 0.5 X10(3) UL (ref 0.1–1)
MONOCYTES NFR BLD AUTO: 8.5 %
NEUTROPHILS # BLD AUTO: 3.46 X10 (3) UL (ref 1.5–7.7)
NEUTROPHILS # BLD AUTO: 3.46 X10(3) UL (ref 1.5–7.7)
NEUTROPHILS NFR BLD AUTO: 58.8 %
NITRITE UR QL STRIP.AUTO: NEGATIVE
OSMOLALITY SERPL CALC.SUM OF ELEC: 286 MOSM/KG (ref 275–295)
PH UR: 5.5 [PH] (ref 5–8)
PLATELET # BLD AUTO: 279 10(3)UL (ref 150–450)
POTASSIUM SERPL-SCNC: 3.3 MMOL/L (ref 3.5–5.1)
PROT SERPL-MCNC: 6.8 G/DL (ref 6.4–8.2)
PROT SERPL-MCNC: 6.9 G/DL (ref 6.4–8.2)
PROT UR-MCNC: NEGATIVE MG/DL
RBC # BLD AUTO: 4.14 X10(6)UL
SODIUM SERPL-SCNC: 140 MMOL/L (ref 136–145)
SP GR UR STRIP: 1.02 (ref 1–1.03)
UROBILINOGEN UR STRIP-ACNC: NORMAL
WBC # BLD AUTO: 5.9 X10(3) UL (ref 4–11)

## 2023-08-16 PROCEDURE — 3079F DIAST BP 80-89 MM HG: CPT | Performed by: HOSPITALIST

## 2023-08-16 PROCEDURE — 76376 3D RENDER W/INTRP POSTPROCES: CPT | Performed by: EMERGENCY MEDICINE

## 2023-08-16 PROCEDURE — 74181 MRI ABDOMEN W/O CONTRAST: CPT | Performed by: EMERGENCY MEDICINE

## 2023-08-16 PROCEDURE — 3075F SYST BP GE 130 - 139MM HG: CPT | Performed by: HOSPITALIST

## 2023-08-16 PROCEDURE — 99254 IP/OBS CNSLTJ NEW/EST MOD 60: CPT | Performed by: SURGERY

## 2023-08-16 PROCEDURE — 3008F BODY MASS INDEX DOCD: CPT | Performed by: HOSPITALIST

## 2023-08-16 PROCEDURE — 99222 1ST HOSP IP/OBS MODERATE 55: CPT | Performed by: HOSPITALIST

## 2023-08-16 RX ORDER — MELATONIN
1000 DAILY
COMMUNITY

## 2023-08-16 RX ORDER — LORAZEPAM 1 MG/1
0.5 TABLET ORAL ONCE
Status: DISCONTINUED | OUTPATIENT
Start: 2023-08-16 | End: 2023-08-16

## 2023-08-16 RX ORDER — PROCHLORPERAZINE EDISYLATE 5 MG/ML
5 INJECTION INTRAMUSCULAR; INTRAVENOUS EVERY 8 HOURS PRN
Status: DISCONTINUED | OUTPATIENT
Start: 2023-08-16 | End: 2023-08-20

## 2023-08-16 RX ORDER — TEMAZEPAM 15 MG/1
15 CAPSULE ORAL NIGHTLY PRN
Status: DISCONTINUED | OUTPATIENT
Start: 2023-08-16 | End: 2023-08-20

## 2023-08-16 RX ORDER — MORPHINE SULFATE 4 MG/ML
4 INJECTION, SOLUTION INTRAMUSCULAR; INTRAVENOUS ONCE
Status: DISCONTINUED | OUTPATIENT
Start: 2023-08-16 | End: 2023-08-20

## 2023-08-16 RX ORDER — HYDROMORPHONE HYDROCHLORIDE 1 MG/ML
0.4 INJECTION, SOLUTION INTRAMUSCULAR; INTRAVENOUS; SUBCUTANEOUS EVERY 2 HOUR PRN
Status: DISCONTINUED | OUTPATIENT
Start: 2023-08-16 | End: 2023-08-17

## 2023-08-16 RX ORDER — SODIUM CHLORIDE 9 MG/ML
INJECTION, SOLUTION INTRAVENOUS CONTINUOUS
Status: DISCONTINUED | OUTPATIENT
Start: 2023-08-16 | End: 2023-08-17

## 2023-08-16 RX ORDER — ONDANSETRON 2 MG/ML
4 INJECTION INTRAMUSCULAR; INTRAVENOUS EVERY 6 HOURS PRN
Status: DISCONTINUED | OUTPATIENT
Start: 2023-08-16 | End: 2023-08-20

## 2023-08-16 RX ORDER — LORAZEPAM 2 MG/ML
0.5 INJECTION INTRAMUSCULAR ONCE
Status: COMPLETED | OUTPATIENT
Start: 2023-08-16 | End: 2023-08-16

## 2023-08-16 RX ORDER — DIPHENHYDRAMINE HYDROCHLORIDE 50 MG/ML
25 INJECTION INTRAMUSCULAR; INTRAVENOUS ONCE
Status: DISCONTINUED | OUTPATIENT
Start: 2023-08-16 | End: 2023-08-20

## 2023-08-16 RX ORDER — METRONIDAZOLE 500 MG/100ML
500 INJECTION, SOLUTION INTRAVENOUS ONCE
Status: COMPLETED | OUTPATIENT
Start: 2023-08-16 | End: 2023-08-16

## 2023-08-16 RX ORDER — KETOROLAC TROMETHAMINE 15 MG/ML
15 INJECTION, SOLUTION INTRAMUSCULAR; INTRAVENOUS ONCE
Status: DISCONTINUED | OUTPATIENT
Start: 2023-08-16 | End: 2023-08-16

## 2023-08-16 RX ORDER — DIPHENHYDRAMINE HCL 25 MG
25 CAPSULE ORAL ONCE
Status: COMPLETED | OUTPATIENT
Start: 2023-08-16 | End: 2023-08-16

## 2023-08-16 RX ORDER — LORAZEPAM 2 MG/1
2 TABLET ORAL 3 TIMES DAILY PRN
COMMUNITY

## 2023-08-16 RX ORDER — HYDROMORPHONE HYDROCHLORIDE 1 MG/ML
0.8 INJECTION, SOLUTION INTRAMUSCULAR; INTRAVENOUS; SUBCUTANEOUS EVERY 2 HOUR PRN
Status: DISCONTINUED | OUTPATIENT
Start: 2023-08-16 | End: 2023-08-17

## 2023-08-16 RX ORDER — HYDROMORPHONE HYDROCHLORIDE 1 MG/ML
0.2 INJECTION, SOLUTION INTRAMUSCULAR; INTRAVENOUS; SUBCUTANEOUS EVERY 2 HOUR PRN
Status: DISCONTINUED | OUTPATIENT
Start: 2023-08-16 | End: 2023-08-17

## 2023-08-16 RX ORDER — FLUOXETINE HYDROCHLORIDE 60 MG/1
60 TABLET, FILM COATED ORAL; ORAL DAILY
COMMUNITY

## 2023-08-16 RX ORDER — KETOROLAC TROMETHAMINE 15 MG/ML
15 INJECTION, SOLUTION INTRAMUSCULAR; INTRAVENOUS ONCE
Status: COMPLETED | OUTPATIENT
Start: 2023-08-16 | End: 2023-08-16

## 2023-08-16 RX ORDER — METRONIDAZOLE 500 MG/100ML
500 INJECTION, SOLUTION INTRAVENOUS EVERY 8 HOURS
Status: DISCONTINUED | OUTPATIENT
Start: 2023-08-17 | End: 2023-08-20

## 2023-08-16 RX ORDER — POTASSIUM CHLORIDE 20 MEQ/1
40 TABLET, EXTENDED RELEASE ORAL ONCE
Status: COMPLETED | OUTPATIENT
Start: 2023-08-16 | End: 2023-08-16

## 2023-08-16 NOTE — ED INITIAL ASSESSMENT (HPI)
S: pt left ama from [address] [facility]" stating that she did not want to have surgery there. Had a gb us that shows sludge and dilated common bile duct. Pt received ceftriaxone (0900) and metronidazole (2300).     B: see chart    A: pt is uncomfortable     R: protocol

## 2023-08-16 NOTE — H&P
[facility]    PATIENT'S NAME: GIOVANI SEGUNDO   ATTENDING PHYSICIAN: Yoly Cantu MD   PATIENT ACCOUNT#:   [id number]    LOCATION:  [address]  MEDICAL RECORD #:   [id number]       YOB: 1975  ADMISSION DATE:       08/16/2023    HISTORY AND PHYSICAL EXAMINATION    CHIEF COMPLAINT:  Abdominal pain, abnormal liver function tests. HISTORY OF PRESENT ILLNESS:  Patient is a 51-year-old  female who was hospitalized at [facility] 2 days ago with right upper quadrant abdominal pain radiating to her back. She had workup including gallbladder ultrasound which showed cholelithiasis. Also, she had nuclear medicine gallbladder scan, which was negative. She could not tolerate an MRCP, and she signed out against medical advice today and came into [facility]. Liver function tests showed AST and  and 287, alkaline phosphatase 124. Lipase was negative. CBC and chemistry were unremarkable. Urinalysis showed no evidence of urinary tract infection. MRCP of the abdomen was ordered, and patient will be admitted to the hospital for further management. PAST MEDICAL HISTORY:  Hypothyroidism, anxiety, depression, hyperlipidemia. PAST SURGICAL HISTORY:  Four C-sections. MEDICATIONS:  Please see medication reconciliation list.     ALLERGIES:  Side effects to morphine. No known drug allergies. FAMILY HISTORY:  Mother had breast cancer and hypertension. Father had coronary artery disease. SOCIAL HISTORY:  Chronic tobacco use. No alcohol or drug use. Lives with her family. Independent for basic activities of daily living. REVIEW OF SYSTEMS:  Right upper quadrant abdominal pain, started around 2 night ago, associated with nausea. No fever or chills. She was able to tolerate a bagel this morning without significant problems. Pain radiates to the back. Other 12-point review of systems is negative.        PHYSICAL EXAMINATION: GENERAL:  Alert and oriented to time, place and person. Mild to moderate distress. VITAL SIGNS:  Temperature 98.3, pulse 67, respiratory rate 17, blood pressure 122/74, pulse ox 98% on room air. HEENT:  Atraumatic. Oropharynx clear. Dry mucous membranes. Normal hard and soft palate. Eyes:  Anicteric sclerae. NECK:  Supple. No lymphadenopathy. Trachea midline. Full range of motion. LUNGS:  Clear to auscultation bilaterally. Normal respiratory effort. HEART:  Regular rate and rhythm. S1 and S2 auscultated. No murmur. ABDOMEN:  Soft, nondistended. Tenderness noted in right upper quadrant area. No guarding or rebound tenderness. EXTREMITIES:  No peripheral edema, clubbing or cyanosis. NEUROLOGIC:  Motor and sensory intact. Cranial nerves II to XII are intact. ASSESSMENT:  Cholelithiasis and possible biliary colic. Rule out choledocholithiasis. PLAN:  MRCP of the abdomen was ordered. Patient will be admitted to general medical floor. IV antibiotics. Gastroenterology and possible general surgery consults. If MRCP is negative, patient will need cholecystectomy. Pain control. Further recommendations to follow.      Dictated By Noelle Fermin MD  d: 08/16/2023 17:55:09  t: 08/16/2023 18:11:55  Job [id number]  /

## 2023-08-16 NOTE — ED QUICK NOTES
Orders for admission, patient is aware of plan and ready to go upstairs. Any questions, please call ED RN Nata at extension [phone].      Patient Covid vaccination status: Unvaccinated     COVID Test Ordered in ED: None    COVID Suspicion at Admission: N/A    Running Infusions:      Mental Status/LOC at time of transport: Alert and oriented x4    Other pertinent information: npo  CIWA score: N/A   NIH score:  N/A

## 2023-08-17 ENCOUNTER — ANESTHESIA (OUTPATIENT)
Dept: SURGERY | Facility: HOSPITAL | Age: 48
End: 2023-08-17
Payer: MEDICAID

## 2023-08-17 ENCOUNTER — ANESTHESIA EVENT (OUTPATIENT)
Dept: SURGERY | Facility: HOSPITAL | Age: 48
End: 2023-08-17
Payer: MEDICAID

## 2023-08-17 LAB
ALBUMIN SERPL-MCNC: 2.8 G/DL (ref 3.4–5)
ALBUMIN/GLOB SERPL: 0.9 {RATIO} (ref 1–2)
ALP LIVER SERPL-CCNC: 107 U/L
ALT SERPL-CCNC: 201 U/L
ANION GAP SERPL CALC-SCNC: 1 MMOL/L (ref 0–18)
AST SERPL-CCNC: 112 U/L (ref 15–37)
BASOPHILS # BLD AUTO: 0.03 X10(3) UL (ref 0–0.2)
BASOPHILS NFR BLD AUTO: 0.5 %
BILIRUB SERPL-MCNC: 0.4 MG/DL (ref 0.1–2)
BUN BLD-MCNC: 7 MG/DL (ref 7–18)
BUN/CREAT SERPL: 12.3 (ref 10–20)
CALCIUM BLD-MCNC: 8.3 MG/DL (ref 8.5–10.1)
CHLORIDE SERPL-SCNC: 111 MMOL/L (ref 98–112)
CO2 SERPL-SCNC: 30 MMOL/L (ref 21–32)
CREAT BLD-MCNC: 0.57 MG/DL
DEPRECATED RDW RBC AUTO: 41.5 FL (ref 35.1–46.3)
EGFRCR SERPLBLD CKD-EPI 2021: 113 ML/MIN/1.73M2 (ref 60–?)
EOSINOPHIL # BLD AUTO: 0.19 X10(3) UL (ref 0–0.7)
EOSINOPHIL NFR BLD AUTO: 3.4 %
ERYTHROCYTE [DISTWIDTH] IN BLOOD BY AUTOMATED COUNT: 12.7 % (ref 11–15)
GLOBULIN PLAS-MCNC: 3 G/DL (ref 2.8–4.4)
GLUCOSE BLD-MCNC: 89 MG/DL (ref 70–99)
HCT VFR BLD AUTO: 34.3 %
HGB BLD-MCNC: 11.3 G/DL
IMM GRANULOCYTES # BLD AUTO: 0.02 X10(3) UL (ref 0–1)
IMM GRANULOCYTES NFR BLD: 0.4 %
LIPASE SERPL-CCNC: 26 U/L (ref 13–75)
LYMPHOCYTES # BLD AUTO: 2.22 X10(3) UL (ref 1–4)
LYMPHOCYTES NFR BLD AUTO: 39.6 %
MCH RBC QN AUTO: 29.4 PG (ref 26–34)
MCHC RBC AUTO-ENTMCNC: 32.9 G/DL (ref 31–37)
MCV RBC AUTO: 89.3 FL
MONOCYTES # BLD AUTO: 0.39 X10(3) UL (ref 0.1–1)
MONOCYTES NFR BLD AUTO: 7 %
NEUTROPHILS # BLD AUTO: 2.76 X10 (3) UL (ref 1.5–7.7)
NEUTROPHILS # BLD AUTO: 2.76 X10(3) UL (ref 1.5–7.7)
NEUTROPHILS NFR BLD AUTO: 49.1 %
OSMOLALITY SERPL CALC.SUM OF ELEC: 291 MOSM/KG (ref 275–295)
PLATELET # BLD AUTO: 260 10(3)UL (ref 150–450)
POTASSIUM SERPL-SCNC: 4.5 MMOL/L (ref 3.5–5.1)
POTASSIUM SERPL-SCNC: 4.5 MMOL/L (ref 3.5–5.1)
PROT SERPL-MCNC: 5.8 G/DL (ref 6.4–8.2)
RBC # BLD AUTO: 3.84 X10(6)UL
SODIUM SERPL-SCNC: 142 MMOL/L (ref 136–145)
WBC # BLD AUTO: 5.6 X10(3) UL (ref 4–11)

## 2023-08-17 PROCEDURE — 99222 1ST HOSP IP/OBS MODERATE 55: CPT | Performed by: INTERNAL MEDICINE

## 2023-08-17 PROCEDURE — 0WQF0ZZ REPAIR ABDOMINAL WALL, OPEN APPROACH: ICD-10-PCS | Performed by: SURGERY

## 2023-08-17 PROCEDURE — 47562 LAPAROSCOPIC CHOLECYSTECTOMY: CPT | Performed by: SURGERY

## 2023-08-17 PROCEDURE — 99233 SBSQ HOSP IP/OBS HIGH 50: CPT | Performed by: INTERNAL MEDICINE

## 2023-08-17 PROCEDURE — 0FT44ZZ RESECTION OF GALLBLADDER, PERCUTANEOUS ENDOSCOPIC APPROACH: ICD-10-PCS | Performed by: SURGERY

## 2023-08-17 RX ORDER — HYDROMORPHONE HYDROCHLORIDE 1 MG/ML
0.2 INJECTION, SOLUTION INTRAMUSCULAR; INTRAVENOUS; SUBCUTANEOUS EVERY 5 MIN PRN
Status: DISCONTINUED | OUTPATIENT
Start: 2023-08-17 | End: 2023-08-17 | Stop reason: HOSPADM

## 2023-08-17 RX ORDER — LEVOTHYROXINE SODIUM 0.07 MG/1
75 TABLET ORAL
Status: DISCONTINUED | OUTPATIENT
Start: 2023-08-17 | End: 2023-08-20

## 2023-08-17 RX ORDER — LORAZEPAM 1 MG/1
2 TABLET ORAL 3 TIMES DAILY PRN
Status: DISCONTINUED | OUTPATIENT
Start: 2023-08-17 | End: 2023-08-17 | Stop reason: HOSPADM

## 2023-08-17 RX ORDER — HYDROMORPHONE HYDROCHLORIDE 1 MG/ML
0.2 INJECTION, SOLUTION INTRAMUSCULAR; INTRAVENOUS; SUBCUTANEOUS EVERY 2 HOUR PRN
Status: DISCONTINUED | OUTPATIENT
Start: 2023-08-17 | End: 2023-08-18

## 2023-08-17 RX ORDER — FLUOXETINE HYDROCHLORIDE 60 MG/1
60 TABLET, FILM COATED ORAL; ORAL DAILY
Status: DISCONTINUED | OUTPATIENT
Start: 2023-08-17 | End: 2023-08-17 | Stop reason: HOSPADM

## 2023-08-17 RX ORDER — ROCURONIUM BROMIDE 10 MG/ML
INJECTION, SOLUTION INTRAVENOUS AS NEEDED
Status: DISCONTINUED | OUTPATIENT
Start: 2023-08-17 | End: 2023-08-17 | Stop reason: SURG

## 2023-08-17 RX ORDER — KETOROLAC TROMETHAMINE 15 MG/ML
30 INJECTION, SOLUTION INTRAMUSCULAR; INTRAVENOUS EVERY 6 HOURS PRN
Status: DISPENSED | OUTPATIENT
Start: 2023-08-17 | End: 2023-08-19

## 2023-08-17 RX ORDER — NALOXONE HYDROCHLORIDE 0.4 MG/ML
80 INJECTION, SOLUTION INTRAMUSCULAR; INTRAVENOUS; SUBCUTANEOUS AS NEEDED
Status: DISCONTINUED | OUTPATIENT
Start: 2023-08-17 | End: 2023-08-17 | Stop reason: HOSPADM

## 2023-08-17 RX ORDER — BUPIVACAINE HYDROCHLORIDE AND EPINEPHRINE 2.5; 5 MG/ML; UG/ML
INJECTION, SOLUTION INFILTRATION; PERINEURAL AS NEEDED
Status: DISCONTINUED | OUTPATIENT
Start: 2023-08-17 | End: 2023-08-17 | Stop reason: HOSPADM

## 2023-08-17 RX ORDER — MORPHINE SULFATE 4 MG/ML
2 INJECTION, SOLUTION INTRAMUSCULAR; INTRAVENOUS EVERY 10 MIN PRN
Status: DISCONTINUED | OUTPATIENT
Start: 2023-08-17 | End: 2023-08-17 | Stop reason: HOSPADM

## 2023-08-17 RX ORDER — HYDROMORPHONE HYDROCHLORIDE 1 MG/ML
0.8 INJECTION, SOLUTION INTRAMUSCULAR; INTRAVENOUS; SUBCUTANEOUS EVERY 2 HOUR PRN
Status: DISCONTINUED | OUTPATIENT
Start: 2023-08-17 | End: 2023-08-18

## 2023-08-17 RX ORDER — FLUOXETINE HYDROCHLORIDE 20 MG/1
60 CAPSULE ORAL DAILY
Status: DISCONTINUED | OUTPATIENT
Start: 2023-08-17 | End: 2023-08-20

## 2023-08-17 RX ORDER — SIMETHICONE 80 MG
80 TABLET,CHEWABLE ORAL ONCE
Status: COMPLETED | OUTPATIENT
Start: 2023-08-17 | End: 2023-08-17

## 2023-08-17 RX ORDER — MORPHINE SULFATE 4 MG/ML
4 INJECTION, SOLUTION INTRAMUSCULAR; INTRAVENOUS EVERY 10 MIN PRN
Status: DISCONTINUED | OUTPATIENT
Start: 2023-08-17 | End: 2023-08-17 | Stop reason: HOSPADM

## 2023-08-17 RX ORDER — HYDROMORPHONE HYDROCHLORIDE 1 MG/ML
0.6 INJECTION, SOLUTION INTRAMUSCULAR; INTRAVENOUS; SUBCUTANEOUS EVERY 5 MIN PRN
Status: DISCONTINUED | OUTPATIENT
Start: 2023-08-17 | End: 2023-08-17 | Stop reason: HOSPADM

## 2023-08-17 RX ORDER — HYDROMORPHONE HYDROCHLORIDE 1 MG/ML
INJECTION, SOLUTION INTRAMUSCULAR; INTRAVENOUS; SUBCUTANEOUS AS NEEDED
Status: DISCONTINUED | OUTPATIENT
Start: 2023-08-17 | End: 2023-08-17 | Stop reason: SURG

## 2023-08-17 RX ORDER — HYDROMORPHONE HYDROCHLORIDE 1 MG/ML
0.4 INJECTION, SOLUTION INTRAMUSCULAR; INTRAVENOUS; SUBCUTANEOUS EVERY 2 HOUR PRN
Status: DISCONTINUED | OUTPATIENT
Start: 2023-08-17 | End: 2023-08-17 | Stop reason: DRUGHIGH

## 2023-08-17 RX ORDER — SODIUM CHLORIDE, SODIUM LACTATE, POTASSIUM CHLORIDE, CALCIUM CHLORIDE 600; 310; 30; 20 MG/100ML; MG/100ML; MG/100ML; MG/100ML
INJECTION, SOLUTION INTRAVENOUS CONTINUOUS
Status: DISCONTINUED | OUTPATIENT
Start: 2023-08-17 | End: 2023-08-17 | Stop reason: HOSPADM

## 2023-08-17 RX ORDER — HYDROMORPHONE HYDROCHLORIDE 1 MG/ML
0.2 INJECTION, SOLUTION INTRAMUSCULAR; INTRAVENOUS; SUBCUTANEOUS EVERY 2 HOUR PRN
Status: DISCONTINUED | OUTPATIENT
Start: 2023-08-17 | End: 2023-08-17 | Stop reason: DRUGHIGH

## 2023-08-17 RX ORDER — MORPHINE SULFATE 10 MG/ML
6 INJECTION, SOLUTION INTRAMUSCULAR; INTRAVENOUS EVERY 10 MIN PRN
Status: DISCONTINUED | OUTPATIENT
Start: 2023-08-17 | End: 2023-08-17 | Stop reason: HOSPADM

## 2023-08-17 RX ORDER — HYDROMORPHONE HYDROCHLORIDE 1 MG/ML
0.4 INJECTION, SOLUTION INTRAMUSCULAR; INTRAVENOUS; SUBCUTANEOUS EVERY 2 HOUR PRN
Status: DISCONTINUED | OUTPATIENT
Start: 2023-08-17 | End: 2023-08-18

## 2023-08-17 RX ORDER — ONDANSETRON 2 MG/ML
INJECTION INTRAMUSCULAR; INTRAVENOUS AS NEEDED
Status: DISCONTINUED | OUTPATIENT
Start: 2023-08-17 | End: 2023-08-17 | Stop reason: SURG

## 2023-08-17 RX ORDER — SODIUM CHLORIDE, SODIUM LACTATE, POTASSIUM CHLORIDE, CALCIUM CHLORIDE 600; 310; 30; 20 MG/100ML; MG/100ML; MG/100ML; MG/100ML
INJECTION, SOLUTION INTRAVENOUS CONTINUOUS PRN
Status: DISCONTINUED | OUTPATIENT
Start: 2023-08-17 | End: 2023-08-17 | Stop reason: SURG

## 2023-08-17 RX ORDER — DEXAMETHASONE SODIUM PHOSPHATE 4 MG/ML
VIAL (ML) INJECTION AS NEEDED
Status: DISCONTINUED | OUTPATIENT
Start: 2023-08-17 | End: 2023-08-17 | Stop reason: SURG

## 2023-08-17 RX ORDER — LEVOTHYROXINE SODIUM 0.07 MG/1
75 TABLET ORAL
Status: DISCONTINUED | OUTPATIENT
Start: 2023-08-17 | End: 2023-08-17 | Stop reason: HOSPADM

## 2023-08-17 RX ORDER — LIDOCAINE HYDROCHLORIDE 10 MG/ML
INJECTION, SOLUTION EPIDURAL; INFILTRATION; INTRACAUDAL; PERINEURAL AS NEEDED
Status: DISCONTINUED | OUTPATIENT
Start: 2023-08-17 | End: 2023-08-17 | Stop reason: SURG

## 2023-08-17 RX ORDER — HYDROMORPHONE HYDROCHLORIDE 1 MG/ML
0.1 INJECTION, SOLUTION INTRAMUSCULAR; INTRAVENOUS; SUBCUTANEOUS EVERY 2 HOUR PRN
Status: DISCONTINUED | OUTPATIENT
Start: 2023-08-17 | End: 2023-08-17 | Stop reason: DRUGHIGH

## 2023-08-17 RX ORDER — HYDROMORPHONE HYDROCHLORIDE 1 MG/ML
0.4 INJECTION, SOLUTION INTRAMUSCULAR; INTRAVENOUS; SUBCUTANEOUS EVERY 5 MIN PRN
Status: DISCONTINUED | OUTPATIENT
Start: 2023-08-17 | End: 2023-08-17 | Stop reason: HOSPADM

## 2023-08-17 RX ADMIN — DEXAMETHASONE SODIUM PHOSPHATE 4 MG: 4 MG/ML VIAL (ML) INJECTION at 12:40:00

## 2023-08-17 RX ADMIN — LIDOCAINE HYDROCHLORIDE 30 MG: 10 INJECTION, SOLUTION EPIDURAL; INFILTRATION; INTRACAUDAL; PERINEURAL at 12:37:00

## 2023-08-17 RX ADMIN — SODIUM CHLORIDE, SODIUM LACTATE, POTASSIUM CHLORIDE, CALCIUM CHLORIDE: 600; 310; 30; 20 INJECTION, SOLUTION INTRAVENOUS at 12:30:00

## 2023-08-17 RX ADMIN — ROCURONIUM BROMIDE 10 MG: 10 INJECTION, SOLUTION INTRAVENOUS at 12:37:00

## 2023-08-17 RX ADMIN — ROCURONIUM BROMIDE 20 MG: 10 INJECTION, SOLUTION INTRAVENOUS at 12:40:00

## 2023-08-17 RX ADMIN — HYDROMORPHONE HYDROCHLORIDE 0.1 MG: 1 INJECTION, SOLUTION INTRAMUSCULAR; INTRAVENOUS; SUBCUTANEOUS at 12:37:00

## 2023-08-17 RX ADMIN — ONDANSETRON 4 MG: 2 INJECTION INTRAMUSCULAR; INTRAVENOUS at 12:40:00

## 2023-08-17 RX ADMIN — SODIUM CHLORIDE, SODIUM LACTATE, POTASSIUM CHLORIDE, CALCIUM CHLORIDE: 600; 310; 30; 20 INJECTION, SOLUTION INTRAVENOUS at 13:04:00

## 2023-08-17 NOTE — ANESTHESIA POSTPROCEDURE EVALUATION
Patient: Rey Kauffman    Procedure Summary       Date: 08/17/23 Room / Location: [address] [place] [address] MAIN OR 02 / [address] [place] [address] MAIN OR    Anesthesia Start: 1570 Anesthesia Stop: 5918    Procedure: LAPAROSCOPIC CHOLECYSTECTOMY (Abdomen) Diagnosis:       Cholecystitis      (Cholecystitis [K81.9])    Surgeons: Rosanna Segura MD Anesthesiologist: Vale Galeana MD    Anesthesia Type: general ASA Status: 2            Anesthesia Type: general    Vitals Value Taken Time   /98 08/17/23 1328   Temp 97.2 08/17/23 1331   Pulse 88 08/17/23 1330   Resp 18 08/17/23 1330   SpO2 98 % 08/17/23 1330   Vitals shown include unvalidated device data.     [address] [place] [address] AN Post Evaluation:   Patient Evaluated in PACU  Patient Participation: complete - patient participated  Level of Consciousness: awake  Pain Score: 0  Pain Management: adequate  Airway Patency:patent  Dental exam unchanged from preop  Yes    Cardiovascular Status: acceptable  Respiratory Status: acceptable  Postoperative Hydration acceptable      Jorge Hickman CRNA  8/17/2023 1:31 PM

## 2023-08-17 NOTE — ED QUICK NOTES
Rounded on patient. Pt resting on cart with eyes closed. Pt asking for pain medication. Offered benadryl and Morphine as ordered but patient states she would like to wait for dilaudid upstairs. MD updated.

## 2023-08-17 NOTE — CONSULTS
[facility]    PATIENT'S NAME: Tony ROSE   ATTENDING PHYSICIAN: Fe Morris MD   CONSULTING PHYSICIAN: Willian Carey MD   PATIENT ACCOUNT#:   [id number]    LOCATION:  [address]U [address] RECORD #:   [id number]       YOB: 1975  ADMISSION DATE:       2023      CONSULT DATE:  2023    REPORT OF CONSULTATION      REASON FOR CONSULTATION:  Acute cholecystitis. HISTORY OF PRESENT ILLNESS:  The patient is a pleasant 51-year-old female with a history of anxiety and depression. She is admitted after she left [facility] with right upper quadrant pain radiating to her back. She could not tolerate an MRCP due to her claustrophobia, left AMA. She presents to [place] [address] ER with evidence of transaminitis and a stone in the neck of the gallbladder. She did have an MRCP here which showed no evidence of choledocholithiasis. PAST MEDICAL HISTORY:  Hypothyroidism, anxiety, hyperlipidemia. PAST SURGICAL HISTORY:   x4. MEDICATIONS:  Please see reconciliation. ALLERGIES:  None. SOCIAL HISTORY:  Chronic smoker. Denies illicits. FAMILY HISTORY:  Significant for breast cancer and hypertension. Father with coronary disease. REVIEW OF SYSTEMS:  Significant for 2 days of upper abdominal pain, nausea, inanition. Otherwise, 12-point system unremarkable. PHYSICAL EXAMINATION:    GENERAL:  She appears anxious but comfortable, in no distress. VITAL SIGNS:  She is afebrile, 98.3, with stable vital signs. HEENT:  Atraumatic. EOMI, PERRLA. NECK:  Supple without lymphadenopathy. Trachea midline. Full range of motion. LUNGS:  Clear to auscultation. No rhonchi or wheezing. HEART:  Regular rate and rhythm. No murmur, rubs, or gallops. ABDOMEN:  Soft. Tender in the right upper quadrant. Negative Toney sign. No rebound or guarding. EXTREMITIES:  Without clubbing, cyanosis, or edema. LABORATORY DATA:  Slight elevation in her LFTs. Bilirubin normal.  White count 5.6. MRCP shows no choledocholithiasis. Stone in the neck of the gallbladder. IMPRESSION:  Acute cholecystitis. PLAN:  Laparoscopic cholecystectomy. Risks, benefits, and options explained. I thank you for this consultation.     Dictated By Belia Ryan MD  d: 08/17/2023 07:17:45  t: 08/17/2023 08:23:55  [facility] [id number]  GL/

## 2023-08-17 NOTE — PROGRESS NOTES
General surgery consult    Acute cholecystitis    NPO, IV antibiotics, plan, laparoscopic, cholecystectomy in a.m.,    Chart reviewed, full dictated consult to follow

## 2023-08-17 NOTE — PLAN OF CARE
Patient alert and oriented. Up ind in the room. Lap tuan done today. Dressing changed multiple times due to saturation, MD aware. Patient complained of sever pain post procedure, MD surgery paged, no new orders. Patient on clear liquid diet, advance as tolerated. Call light within reach. All safety precautions in place. Frequent rounding by staff. Problem: Patient Centered Care  Goal: Patient preferences are identified and integrated in the patient's plan of care  Description: Interventions:  - What would you like us to know as we care for you?  From home with   - Provide timely, complete, and accurate information to patient/family  - Incorporate patient and family knowledge, values, beliefs, and cultural backgrounds into the planning and delivery of care  - Encourage patient/family to participate in care and decision-making at the level they choose  - Honor patient and family perspectives and choices  Outcome: Progressing     Problem: GASTROINTESTINAL - ADULT  Goal: Maintains or returns to baseline bowel function  Description: INTERVENTIONS:  - Assess bowel function  - Maintain adequate hydration with IV or PO as ordered and tolerated  - Evaluate effectiveness of GI medications  - Encourage mobilization and activity  - Obtain nutritional consult as needed  - Establish a toileting routine/schedule  - Consider collaborating with pharmacy to review patient's medication profile  Outcome: Progressing     Problem: Impaired Functional Mobility  Goal: Achieve highest/safest level of mobility/gait  Description: Interventions:  - Assess patient's functional ability and stability  - Promote increasing activity/tolerance for mobility and gait  - Educate and engage patient/family in tolerated activity level and precautions  - Recommend use of  RW for transfers and ambulation  Outcome: Progressing     Problem: Impaired Activities of Daily Living  Goal: Achieve highest/safest level of independence in self care  Description: Interventions:  - Assess ability and encourage patient to participate in ADLs to maximize function  - Promote sitting position while performing ADLs such as feeding, grooming, and bathing  - Educate and encourage patient/family in tolerated functional activity level and precautions during self-care  - Provide support under elbow of weak side to prevent shoulder subluxation  Outcome: Progressing     Problem: PAIN - ADULT  Goal: Verbalizes/displays adequate comfort level or patient's stated pain goal  Description: INTERVENTIONS:  - Encourage pt to monitor pain and request assistance  - Assess pain using appropriate pain scale  - Administer analgesics based on type and severity of pain and evaluate response  - Implement non-pharmacological measures as appropriate and evaluate response  - Consider cultural and social influences on pain and pain management  - Manage/alleviate anxiety  - Utilize distraction and/or relaxation techniques  - Monitor for opioid side effects  - Notify MD/LIP if interventions unsuccessful or patient reports new pain  - Anticipate increased pain with activity and pre-medicate as appropriate  Outcome: Progressing     Problem: SAFETY ADULT - FALL  Goal: Free from fall injury  Description: INTERVENTIONS:  - Assess pt frequently for physical needs  - Identify cognitive and physical deficits and behaviors that affect risk of falls.   - [place] fall precautions as indicated by assessment.  - Educate pt/family on patient safety including physical limitations  - Instruct pt to call for assistance with activity based on assessment  - Modify environment to reduce risk of injury  - Provide assistive devices as appropriate  - Consider OT/PT consult to assist with strengthening/mobility  - Encourage toileting schedule  Outcome: Progressing     Problem: DISCHARGE PLANNING  Goal: Discharge to home or other facility with appropriate resources  Description: INTERVENTIONS:  - Identify barriers to discharge w/pt and caregiver  - Include patient/family/discharge partner in discharge planning  - Arrange for needed discharge resources and transportation as appropriate  - Identify discharge learning needs (meds, wound care, etc)  - Arrange for interpreters to assist at discharge as needed  - Consider post-discharge preferences of patient/family/discharge partner  - Complete POLST form as appropriate  - Assess patient's ability to be responsible for managing their own health  - Refer to Case Management Department for coordinating discharge planning if the patient needs post-hospital services based on physician/LIP order or complex needs related to functional status, cognitive ability or social support system  Outcome: Progressing

## 2023-08-17 NOTE — PLAN OF CARE
Patient admitted onto floor overnight, currently resting comfortably in bed. Prn medication given for pain with improvement. NPO since midnight for lap tuan today. Problem: Patient Centered Care  Goal: Patient preferences are identified and integrated in the patient's plan of care  Description: Interventions:  - What would you like us to know as we care for you?  From home with   - Provide timely, complete, and accurate information to patient/family  - Incorporate patient and family knowledge, values, beliefs, and cultural backgrounds into the planning and delivery of care  - Encourage patient/family to participate in care and decision-making at the level they choose  - Honor patient and family perspectives and choices  Outcome: Progressing     Problem: GASTROINTESTINAL - ADULT  Goal: Maintains or returns to baseline bowel function  Description: INTERVENTIONS:  - Assess bowel function  - Maintain adequate hydration with IV or PO as ordered and tolerated  - Evaluate effectiveness of GI medications  - Encourage mobilization and activity  - Obtain nutritional consult as needed  - Establish a toileting routine/schedule  - Consider collaborating with pharmacy to review patient's medication profile  Outcome: Progressing     Problem: PAIN - ADULT  Goal: Verbalizes/displays adequate comfort level or patient's stated pain goal  Description: INTERVENTIONS:  - Encourage pt to monitor pain and request assistance  - Assess pain using appropriate pain scale  - Administer analgesics based on type and severity of pain and evaluate response  - Implement non-pharmacological measures as appropriate and evaluate response  - Consider cultural and social influences on pain and pain management  - Manage/alleviate anxiety  - Utilize distraction and/or relaxation techniques  - Monitor for opioid side effects  - Notify MD/LIP if interventions unsuccessful or patient reports new pain  - Anticipate increased pain with activity and pre-medicate as appropriate  Outcome: Progressing

## 2023-08-17 NOTE — BRIEF OP NOTE
Pre-Operative Diagnosis: Cholecystitis [K81.9]     Post-Operative Diagnosis: Cholecystitis [K81.9] , severe acute and chronic  Umbilical hernia     Procedure Performed:   LAPAROSCOPIC CHOLECYSTECTOMY, umbilical hernia repair    Surgeon(s) and Role:     Hector Gongora MD - Primary    Assistant(s):  Surgical Assistant.: Sneha Gifford CSA  PA: Emery Lagos PA-C     Surgical Findings: Severe acute and chronic cholecystitis     Specimen: Gallbladder     Estimated Blood Loss: Blood Output: 5 mL (8/17/2023  1:05 PM)      Dictation Number:      Aniya Stephenson MD  8/17/2023  1:08 PM

## 2023-08-17 NOTE — ANESTHESIA PROCEDURE NOTES
Airway  Date/Time: 8/17/2023 12:38 PM  Urgency: Elective    Airway not difficult    General Information and Staff    Patient location during procedure: OR  Anesthesiologist: Jenna Cummins MD  Resident/CRNA: Johnny Whatley CRNA  Performed: CRNA   Performed by: Johnny Whatley CRNA  Authorized by: Jenna Cummins MD      Indications and Patient Condition  Indications for airway management: anesthesia  Sedation level: deep  Preoxygenated: yes  Patient position: sniffing  Mask difficulty assessment: 1 - vent by mask    Final Airway Details  Final airway type: endotracheal airway      Successful airway: ETT  Cuffed: yes   Successful intubation technique: Video laryngoscopy  Facilitating devices/methods: intubating stylet  Endotracheal tube insertion site: oral  Blade type: Debi Nine.   Blade size: #3  ETT size (mm): 7.0    Cormack-Lehane Classification: grade I - full view of glottis  Placement verified by: capnometry   Measured from: lips  ETT to lips (cm): 21  Number of attempts at approach: 1  Number of other approaches attempted: 0

## 2023-08-17 NOTE — DISCHARGE INSTRUCTIONS
Ice pack as needed. May shower. 15 pound lifting restriction for weeks May drive in 1 week or when pain-free  Ibuprofen 600 mg every 6 hours for pain Norco for breakthrough pain  Leave white strips in place for 1 week.   Follow-up 2 weeks  Advance diet as tolerated

## 2023-08-17 NOTE — OPERATIVE REPORT
[facility]    PATIENT'S NAME: Gena ROSE   ATTENDING PHYSICIAN: Ya Nick MD   OPERATING PHYSICIAN: Minor Santamaria MD   PATIENT ACCOUNT#:   [de-identified]    LOCATION:  [facility] OR [facility] 11 [facility] 10  MEDICAL RECORD #:   [id number]       YOB: 1975  ADMISSION DATE:       08/16/2023      OPERATION DATE:  08/17/2023    OPERATIVE REPORT      PREOPERATIVE DIAGNOSIS:  Cholecystitis. POSTOPERATIVE DIAGNOSIS:    1. Severe acute and chronic cholecystitis. 2.   Umbilical hernia. PROCEDURE:    1. Laparoscopic cholecystectomy. 2.   Umbilical hernia repair. ASSISTANT:    1. [facility]. 2.   Lucy Lawton PA-C.    ESTIMATED BLOOD LOSS:  5 mL. COMPLICATION:  None. ANESTHESIA:  General.    DISPOSITION:  To Recovery, tolerated well. INDICATIONS:  Patient is 52 with the above complaint. Consent obtained. OPERATIVE TECHNIQUE:  She is taken to surgery. She is prepped and draped in usual sterile fashion. Infraumbilical curvilinear incision is made. Hernia sac is dissected freely. Alverta Drown is placed through the hernia and the abdomen insufflated. Three additional trocars are placed under laparoscopic guidance. Exploration reveals severe acute on chronic cholecystitis. The gallbladder is decompressed and there is evidence of a hydrops with clear fluid and pus. The cystic artery, cystic duct are dissected using sharp and blunt dissection. There is extensive scarring and induration. I was able to free up the scar tissue to safely place 2 clips on either side of the duct and artery. Gallbladder is taken from the liver using electrocautery. Right upper quadrant irrigated. Gallbladder retrieved using an endobag and brought out through the umbilical hernia site. All hemostasis assured. The upper 11 mm site is closed with 0 Vicryl on the needle Passer. The trocars are removed. The umbilical hernia now is repaired using running 0 suture.   The umbilicus tacked with chromic and skin closed with 3-0 Monocryl, benzoin and Steri-Strips.      Dictated By Eddie Chandler MD  d: 08/17/2023 13:14:34  t: 08/17/2023 13:24:32  [facility] [id number]  GL/

## 2023-08-18 LAB
BASOPHILS # BLD AUTO: 0.01 X10(3) UL (ref 0–0.2)
BASOPHILS NFR BLD AUTO: 0.1 %
DEPRECATED RDW RBC AUTO: 39.3 FL (ref 35.1–46.3)
EOSINOPHIL # BLD AUTO: 0.01 X10(3) UL (ref 0–0.7)
EOSINOPHIL NFR BLD AUTO: 0.1 %
ERYTHROCYTE [DISTWIDTH] IN BLOOD BY AUTOMATED COUNT: 12.2 % (ref 11–15)
HCT VFR BLD AUTO: 30.9 %
HGB BLD-MCNC: 10.6 G/DL
IMM GRANULOCYTES # BLD AUTO: 0.04 X10(3) UL (ref 0–1)
IMM GRANULOCYTES NFR BLD: 0.4 %
LYMPHOCYTES # BLD AUTO: 1.19 X10(3) UL (ref 1–4)
LYMPHOCYTES NFR BLD AUTO: 11.8 %
MCH RBC QN AUTO: 29.9 PG (ref 26–34)
MCHC RBC AUTO-ENTMCNC: 34.3 G/DL (ref 31–37)
MCV RBC AUTO: 87 FL
MONOCYTES # BLD AUTO: 0.56 X10(3) UL (ref 0.1–1)
MONOCYTES NFR BLD AUTO: 5.6 %
NEUTROPHILS # BLD AUTO: 8.27 X10 (3) UL (ref 1.5–7.7)
NEUTROPHILS # BLD AUTO: 8.27 X10(3) UL (ref 1.5–7.7)
NEUTROPHILS NFR BLD AUTO: 82 %
PLATELET # BLD AUTO: 233 10(3)UL (ref 150–450)
RBC # BLD AUTO: 3.55 X10(6)UL
WBC # BLD AUTO: 10.1 X10(3) UL (ref 4–11)

## 2023-08-18 PROCEDURE — 99233 SBSQ HOSP IP/OBS HIGH 50: CPT | Performed by: NURSE PRACTITIONER

## 2023-08-18 PROCEDURE — 99233 SBSQ HOSP IP/OBS HIGH 50: CPT | Performed by: INTERNAL MEDICINE

## 2023-08-18 RX ORDER — HYDROCODONE BITARTRATE AND ACETAMINOPHEN 5; 325 MG/1; MG/1
1 TABLET ORAL EVERY 6 HOURS SCHEDULED
Status: DISCONTINUED | OUTPATIENT
Start: 2023-08-18 | End: 2023-08-18

## 2023-08-18 RX ORDER — HYDROCODONE BITARTRATE AND ACETAMINOPHEN 5; 325 MG/1; MG/1
1 TABLET ORAL EVERY 6 HOURS SCHEDULED
Status: DISCONTINUED | OUTPATIENT
Start: 2023-08-18 | End: 2023-08-20

## 2023-08-18 RX ORDER — HYDROMORPHONE HYDROCHLORIDE 1 MG/ML
0.8 INJECTION, SOLUTION INTRAMUSCULAR; INTRAVENOUS; SUBCUTANEOUS EVERY 2 HOUR PRN
Status: DISCONTINUED | OUTPATIENT
Start: 2023-08-18 | End: 2023-08-20

## 2023-08-18 RX ORDER — HYDROMORPHONE HYDROCHLORIDE 1 MG/ML
0.2 INJECTION, SOLUTION INTRAMUSCULAR; INTRAVENOUS; SUBCUTANEOUS
Status: DISCONTINUED | OUTPATIENT
Start: 2023-08-18 | End: 2023-08-20

## 2023-08-18 RX ORDER — SIMETHICONE 80 MG
80 TABLET,CHEWABLE ORAL 4 TIMES DAILY PRN
Status: DISCONTINUED | OUTPATIENT
Start: 2023-08-18 | End: 2023-08-20

## 2023-08-18 RX ORDER — HYDROMORPHONE HYDROCHLORIDE 1 MG/ML
0.4 INJECTION, SOLUTION INTRAMUSCULAR; INTRAVENOUS; SUBCUTANEOUS EVERY 2 HOUR PRN
Status: DISCONTINUED | OUTPATIENT
Start: 2023-08-18 | End: 2023-08-20

## 2023-08-18 NOTE — PLAN OF CARE
Patient alert and oriented. JENNYFER drain removed by MD today. Patient still needing dilaudid frequently for pain. MD added scheduled norco. Patient ambulate ind in the hallway/balcony. Call light within reach. All safety precautions in place. Frequent rounding by staff. Problem: Patient Centered Care  Goal: Patient preferences are identified and integrated in the patient's plan of care  Description: Interventions:  - What would you like us to know as we care for you?  From home with   - Provide timely, complete, and accurate information to patient/family  - Incorporate patient and family knowledge, values, beliefs, and cultural backgrounds into the planning and delivery of care  - Encourage patient/family to participate in care and decision-making at the level they choose  - Honor patient and family perspectives and choices  Outcome: Progressing     Problem: GASTROINTESTINAL - ADULT  Goal: Maintains or returns to baseline bowel function  Description: INTERVENTIONS:  - Assess bowel function  - Maintain adequate hydration with IV or PO as ordered and tolerated  - Evaluate effectiveness of GI medications  - Encourage mobilization and activity  - Obtain nutritional consult as needed  - Establish a toileting routine/schedule  - Consider collaborating with pharmacy to review patient's medication profile  Outcome: Progressing     Problem: Impaired Functional Mobility  Goal: Achieve highest/safest level of mobility/gait  Description: Interventions:  - Assess patient's functional ability and stability  - Promote increasing activity/tolerance for mobility and gait  - Educate and engage patient/family in tolerated activity level and precautions  Outcome: Progressing     Problem: Impaired Activities of Daily Living  Goal: Achieve highest/safest level of independence in self care  Description: Interventions:  - Assess ability and encourage patient to participate in ADLs to maximize function  - Promote sitting position while performing ADLs such as feeding, grooming, and bathing  - Educate and encourage patient/family in tolerated functional activity level and precautions during self-care  Outcome: Progressing     Problem: PAIN - ADULT  Goal: Verbalizes/displays adequate comfort level or patient's stated pain goal  Description: INTERVENTIONS:  - Encourage pt to monitor pain and request assistance  - Assess pain using appropriate pain scale  - Administer analgesics based on type and severity of pain and evaluate response  - Implement non-pharmacological measures as appropriate and evaluate response  - Consider cultural and social influences on pain and pain management  - Manage/alleviate anxiety  - Utilize distraction and/or relaxation techniques  - Monitor for opioid side effects  - Notify MD/LIP if interventions unsuccessful or patient reports new pain  - Anticipate increased pain with activity and pre-medicate as appropriate  Outcome: Progressing     Problem: SAFETY ADULT - FALL  Goal: Free from fall injury  Description: INTERVENTIONS:  - Assess pt frequently for physical needs  - Identify cognitive and physical deficits and behaviors that affect risk of falls.   - [place] fall precautions as indicated by assessment.  - Educate pt/family on patient safety including physical limitations  - Instruct pt to call for assistance with activity based on assessment  - Modify environment to reduce risk of injury  - Provide assistive devices as appropriate  - Consider OT/PT consult to assist with strengthening/mobility  - Encourage toileting schedule  Outcome: Progressing     Problem: DISCHARGE PLANNING  Goal: Discharge to home or other facility with appropriate resources  Description: INTERVENTIONS:  - Identify barriers to discharge w/pt and caregiver  - Include patient/family/discharge partner in discharge planning  - Arrange for needed discharge resources and transportation as appropriate  - Identify discharge learning needs (meds, wound care, etc)  - Arrange for interpreters to assist at discharge as needed  - Consider post-discharge preferences of patient/family/discharge partner  - Complete POLST form as appropriate  - Assess patient's ability to be responsible for managing their own health  - Refer to Case Management Department for coordinating discharge planning if the patient needs post-hospital services based on physician/LIP order or complex needs related to functional status, cognitive ability or social support system  Outcome: Progressing

## 2023-08-18 NOTE — PLAN OF CARE
Frequent rounding maintained. Patient educated on all medications administered. Bed in lowest position, i bed awareness activated, fall precautions in place and call light within reach at all times. All patients questions answered and needs addressed promptly. Problem: GASTROINTESTINAL - ADULT  Goal: Maintains or returns to baseline bowel function  Description: INTERVENTIONS:  - Assess bowel function  - Maintain adequate hydration with IV or PO as ordered and tolerated  - Evaluate effectiveness of GI medications  - Encourage mobilization and activity  - Obtain nutritional consult as needed  - Establish a toileting routine/schedule  - Consider collaborating with pharmacy to review patient's medication profile  Outcome: Progressing     Problem: PAIN - ADULT  Goal: Verbalizes/displays adequate comfort level or patient's stated pain goal  Description: INTERVENTIONS:  - Encourage pt to monitor pain and request assistance  - Assess pain using appropriate pain scale  - Administer analgesics based on type and severity of pain and evaluate response  - Implement non-pharmacological measures as appropriate and evaluate response  - Consider cultural and social influences on pain and pain management  - Manage/alleviate anxiety  - Utilize distraction and/or relaxation techniques  - Monitor for opioid side effects  - Notify MD/LIP if interventions unsuccessful or patient reports new pain  - Anticipate increased pain with activity and pre-medicate as appropriate  Outcome: Progressing     Problem: SAFETY ADULT - FALL  Goal: Free from fall injury  Description: INTERVENTIONS:  - Assess pt frequently for physical needs  - Identify cognitive and physical deficits and behaviors that affect risk of falls.   - [place] fall precautions as indicated by assessment.  - Educate pt/family on patient safety including physical limitations  - Instruct pt to call for assistance with activity based on assessment  - Modify environment to reduce risk of injury  - Provide assistive devices as appropriate  - Consider OT/PT consult to assist with strengthening/mobility  - Encourage toileting schedule  Outcome: Progressing     Problem: DISCHARGE PLANNING  Goal: Discharge to home or other facility with appropriate resources  Description: INTERVENTIONS:  - Identify barriers to discharge w/pt and caregiver  - Include patient/family/discharge partner in discharge planning  - Arrange for needed discharge resources and transportation as appropriate  - Identify discharge learning needs (meds, wound care, etc)  - Arrange for interpreters to assist at discharge as needed  - Consider post-discharge preferences of patient/family/discharge partner  - Complete POLST form as appropriate  - Assess patient's ability to be responsible for managing their own health  - Refer to Case Management Department for coordinating discharge planning if the patient needs post-hospital services based on physician/LIP order or complex needs related to functional status, cognitive ability or social support system  Outcome: Progressing     Problem: Impaired Functional Mobility  Goal: Achieve highest/safest level of mobility/gait  Description: Interventions:  - Assess patient's functional ability and stability  - Promote increasing activity/tolerance for mobility and gait  - Educate and engage patient/family in tolerated activity level and precautions    Outcome: Progressing     Problem: Impaired Activities of Daily Living  Goal: Achieve highest/safest level of independence in self care  Description: Interventions:  - Assess ability and encourage patient to participate in ADLs to maximize function  - Promote sitting position while performing ADLs such as feeding, grooming, and bathing  - Educate and encourage patient/family in tolerated functional activity level and precautions during self-care    Outcome: Progressing

## 2023-08-19 PROCEDURE — 99233 SBSQ HOSP IP/OBS HIGH 50: CPT | Performed by: INTERNAL MEDICINE

## 2023-08-19 NOTE — PLAN OF CARE
Frequent rounding maintained. Patient educated on all medications administered. Bed in lowest position, bed alarm and i bed awareness activated, fall precautions in place and call light within reach at all times. All patients questions answered and needs addressed promptly. Problem: GASTROINTESTINAL - ADULT  Goal: Maintains or returns to baseline bowel function  Description: INTERVENTIONS:  - Assess bowel function  - Maintain adequate hydration with IV or PO as ordered and tolerated  - Evaluate effectiveness of GI medications  - Encourage mobilization and activity  - Obtain nutritional consult as needed  - Establish a toileting routine/schedule  - Consider collaborating with pharmacy to review patient's medication profile  8/19/2023 0426 by Marlene Muñoz RN  Outcome: Progressing    Problem: PAIN - ADULT  Goal: Verbalizes/displays adequate comfort level or patient's stated pain goal  Description: INTERVENTIONS:  - Encourage pt to monitor pain and request assistance  - Assess pain using appropriate pain scale  - Administer analgesics based on type and severity of pain and evaluate response  - Implement non-pharmacological measures as appropriate and evaluate response  - Consider cultural and social influences on pain and pain management  - Manage/alleviate anxiety  - Utilize distraction and/or relaxation techniques  - Monitor for opioid side effects  - Notify MD/LIP if interventions unsuccessful or patient reports new pain  - Anticipate increased pain with activity and pre-medicate as appropriate  8/19/2023 0426 by Marlene Muñoz RN  Outcome: Progressing       Problem: SAFETY ADULT - FALL  Goal: Free from fall injury  Description: INTERVENTIONS:  - Assess pt frequently for physical needs  - Identify cognitive and physical deficits and behaviors that affect risk of falls.   - [place] fall precautions as indicated by assessment.  - Educate pt/family on patient safety including physical limitations  - Instruct pt to call for assistance with activity based on assessment  - Modify environment to reduce risk of injury  - Provide assistive devices as appropriate  - Consider OT/PT consult to assist with strengthening/mobility  - Encourage toileting schedule  8/19/2023 0426 by Demetria Mack RN  Outcome: Progressing     Problem: DISCHARGE PLANNING  Goal: Discharge to home or other facility with appropriate resources  Description: INTERVENTIONS:  - Identify barriers to discharge w/pt and caregiver  - Include patient/family/discharge partner in discharge planning  - Arrange for needed discharge resources and transportation as appropriate  - Identify discharge learning needs (meds, wound care, etc)  - Arrange for interpreters to assist at discharge as needed  - Consider post-discharge preferences of patient/family/discharge partner  - Complete POLST form as appropriate  - Assess patient's ability to be responsible for managing their own health  - Refer to Case Management Department for coordinating discharge planning if the patient needs post-hospital services based on physician/LIP order or complex needs related to functional status, cognitive ability or social support system  8/19/2023 0426 by Demetria Mack RN  Outcome: Progressing     Problem: Impaired Functional Mobility  Goal: Achieve highest/safest level of mobility/gait  Description: Interventions:  - Assess patient's functional ability and stability  - Promote increasing activity/tolerance for mobility and gait  - Educate and engage patient/family in tolerated activity level and precautions    8/19/2023 0426 by Demetria Mack RN  Outcome: Progressing     Problem: Impaired Activities of Daily Living  Goal: Achieve highest/safest level of independence in self care  Description: Interventions:  - Assess ability and encourage patient to participate in ADLs to maximize function  - Promote sitting position while performing ADLs such as feeding, grooming, and bathing  - Educate and encourage patient/family in tolerated functional activity level and precautions during self-care    8/19/2023 0426 by Edgar Cheatham, JENNIFER  Outcome: Progressing

## 2023-08-20 VITALS
RESPIRATION RATE: 12 BRPM | WEIGHT: 98.13 LBS | BODY MASS INDEX: 21.17 KG/M2 | SYSTOLIC BLOOD PRESSURE: 132 MMHG | TEMPERATURE: 99 F | OXYGEN SATURATION: 97 % | DIASTOLIC BLOOD PRESSURE: 80 MMHG | HEART RATE: 58 BPM | HEIGHT: 57 IN

## 2023-08-20 PROCEDURE — 99239 HOSP IP/OBS DSCHRG MGMT >30: CPT | Performed by: HOSPITALIST

## 2023-08-20 RX ORDER — HYDROCODONE BITARTRATE AND ACETAMINOPHEN 5; 325 MG/1; MG/1
1 TABLET ORAL EVERY 6 HOURS SCHEDULED
Qty: 21 TABLET | Refills: 0 | Status: SHIPPED | OUTPATIENT
Start: 2023-08-20

## 2023-08-20 RX ORDER — ONDANSETRON 4 MG/1
4 TABLET, FILM COATED ORAL EVERY 8 HOURS PRN
Qty: 15 TABLET | Refills: 0 | Status: SHIPPED | OUTPATIENT
Start: 2023-08-20

## 2023-08-20 NOTE — DISCHARGE SUMMARY
[facility] HOSP - [facility]     Discharge Summary    Jani Santana Patient Status:  Inpatient    10/16/1975 MRN [id number]   Location [address] [facility] Attending Julito Izaguirre MD   Hosp Day # 4 PCP No primary care provider on file. Date of Admission: 2023    Date of Discharge: 2023    Admitting Diagnosis: Choledocholithiasis [K80.50]    Discharge Diagnosis: Patient Active Problem List:     Seasonal allergic rhinitis     Vertebral artery dissection (HCC)     Carotid stenosis, asymptomatic, bilateral     Hypothyroidism     Hypermetropia, bilateral     Psychophysiological insomnia     Breast nodule     History of Hashimoto thyroiditis     Panic disorder     Weight loss, abnormal     Cigarette smoker     Toxic multinodular goiter     Hypercholesterolemia     Abdominal pain, acute     Nausea     Bloating     Change in bowel habits     Generalized anxiety disorder     Major depressive disorder     Generalized abdominal pain     Adnexal cyst     Choledocholithiasis     Cholecystitis      Reason for Admission:     Choledocholithiasis    Physical Exam:     General: No acute distress. Alert ,         Respiratory: Clear to auscultation bilaterally. No wheezes. No rhonchi. Cardiovascular: S1, S2. Regular rate and rhythm. No murmurs, rubs or gallops. Abdomen: Soft, nontender, nondistended. Positive bowel sounds. No rebound or guarding. Neurologic: No focal neurological deficits. Musculoskeletal: Moves all extremities. Extremities: No edema. Hospital Course:   #Cholelithiasis and possible biliary colic.   -s/p lap tuan  - drain out  - still in pain  - ambulate  - encourage PO  - Pain meds  - home tomorrow. Machelle Ghosh MD, Yaw Chang, [facility]    History of Present Illness:   Per admitting attending:   Patient is a 49-year-old  female who was hospitalized at [facility] 2 days ago with right upper quadrant abdominal pain radiating to her back.  She had workup including gallbladder ultrasound which showed cholelithiasis. Also, she had nuclear medicine gallbladder scan, which was negative. She could not tolerate an MRCP, and she signed out against medical advice today and came into [facility]. Liver function tests showed AST and  and 287, alkaline phosphatase 124. Lipase was negative. CBC and chemistry were unremarkable. Urinalysis showed no evidence of urinary tract infection. MRCP of the abdomen was ordered, and patient will be admitted to the hospital for further management. Disposition: Home or Self Care    Discharge Condition: Good    Discharge Medications: Current Discharge Medication List    START taking these medications    HYDROcodone-acetaminophen 5-325 MG Oral Tab  Take 1 tablet by mouth every 6 (six) hours. Qty: 21 tablet Refills: 0      CONTINUE these medications which have NOT CHANGED    LORazepam 2 MG Oral Tab  Take 1 tablet (2 mg total) by mouth 3 (three) times daily as needed for Anxiety. cyanocobalamin 1000 MCG Oral Tab  Take 1 tablet (1,000 mcg total) by mouth daily. FLUoxetine HCl 60 MG Oral Tab  Take 60 mg by mouth daily. diclofenac 50 MG Oral Tab EC  Take 1 tablet (50 mg total) by mouth daily. levothyroxine 75 MCG Oral Tab  Take 1 tablet (75 mcg total) by mouth before breakfast.    ergocalciferol 1.25 MG (30043 UT) Oral Cap  Take 1 capsule (50,000 Units total) by mouth once a week. Qty: 10 capsule Refills: 0          Total dc time> 30 min    Ashley Barajas MD  8/20/2023  12:44 PM     Hospital Discharge Diagnoses:  Choledocholithiasis    Lace+ Score: 52  59-90 High Risk  29-58 Medium Risk  0-28   Low Risk. TCM Follow-Up Recommendation:  LACE 29-58:  Moderate Risk of readmission after discharge from the hospital.

## 2023-08-20 NOTE — PLAN OF CARE
Pain 7-9/10, offered Norco, patient requested IV pain med. No nausea overnight. Vitals stable. Continue to monitor. Problem: Patient Centered Care  Goal: Patient preferences are identified and integrated in the patient's plan of care  Description: Interventions:  - What would you like us to know as we care for you? From home with   - Provide timely, complete, and accurate information to patient/family  - Incorporate patient and family knowledge, values, beliefs, and cultural backgrounds into the planning and delivery of care  - Encourage patient/family to participate in care and decision-making at the level they choose  - Honor patient and family perspectives and choices  Outcome: Progressing     Problem: GASTROINTESTINAL - ADULT  Goal: Maintains or returns to baseline bowel function  Description: INTERVENTIONS:  - Assess bowel function  - Maintain adequate hydration with IV or PO as ordered and tolerated  - Evaluate effectiveness of GI medications  - Encourage mobilization and activity  - Obtain nutritional consult as needed  - Establish a toileting routine/schedule  - Consider collaborating with pharmacy to review patient's medication profile  Outcome: Progressing     Problem: SAFETY ADULT - FALL  Goal: Free from fall injury  Description: INTERVENTIONS:  - Assess pt frequently for physical needs  - Identify cognitive and physical deficits and behaviors that affect risk of falls.   - Diana fall precautions as indicated by assessment.  - Educate pt/family on patient safety including physical limitations  - Instruct pt to call for assistance with activity based on assessment  - Modify environment to reduce risk of injury  - Provide assistive devices as appropriate  - Consider OT/PT consult to assist with strengthening/mobility  - Encourage toileting schedule  Outcome: Progressing     Problem: DISCHARGE PLANNING  Goal: Discharge to home or other facility with appropriate resources  Description: INTERVENTIONS:  - Identify barriers to discharge w/pt and caregiver  - Include patient/family/discharge partner in discharge planning  - Arrange for needed discharge resources and transportation as appropriate  - Identify discharge learning needs (meds, wound care, etc)  - Arrange for interpreters to assist at discharge as needed  - Consider post-discharge preferences of patient/family/discharge partner  - Complete POLST form as appropriate  - Assess patient's ability to be responsible for managing their own health  - Refer to Case Management Department for coordinating discharge planning if the patient needs post-hospital services based on physician/LIP order or complex needs related to functional status, cognitive ability or social support system  Outcome: Progressing     Problem: Impaired Functional Mobility  Goal: Achieve highest/safest level of mobility/gait  Description: Interventions:  - Assess patient's functional ability and stability  - Promote increasing activity/tolerance for mobility and gait  - Educate and engage patient/family in tolerated activity level and precautions    Outcome: Progressing     Problem: Impaired Activities of Daily Living  Goal: Achieve highest/safest level of independence in self care  Description: Interventions:  - Assess ability and encourage patient to participate in ADLs to maximize function  - Promote sitting position while performing ADLs such as feeding, grooming, and bathing  - Educate and encourage patient/family in tolerated functional activity level and precautions during self-care    Outcome: Progressing     Problem: PAIN - ADULT  Goal: Verbalizes/displays adequate comfort level or patient's stated pain goal  Description: INTERVENTIONS:  - Encourage pt to monitor pain and request assistance  - Assess pain using appropriate pain scale  - Administer analgesics based on type and severity of pain and evaluate response  - Implement non-pharmacological measures as appropriate and evaluate response  - Consider cultural and social influences on pain and pain management  - Manage/alleviate anxiety  - Utilize distraction and/or relaxation techniques  - Monitor for opioid side effects  - Notify MD/LIP if interventions unsuccessful or patient reports new pain  - Anticipate increased pain with activity and pre-medicate as appropriate  Outcome: Not Progressing

## 2023-08-21 ENCOUNTER — PATIENT OUTREACH (OUTPATIENT)
Dept: CASE MANAGEMENT | Age: 48
End: 2023-08-21

## 2023-08-21 NOTE — PAYOR COMM NOTE
--------------  DISCHARGE REVIEW    Payor: Jonah Alvarenga #:  [id number]  Authorization Number: [id number]    Admit date: 23  Admit time:  10:03 PM  Discharge Date: 2023  2:45 PM     Admitting Physician: Kari Elias MD  Attending Physician:  No att. providers found  Primary Care Physician: No primary care provider on file. Discharge Summary Notes        Discharge Summary signed by Shiloh Darnell MD at 2023 12:47 PM       Author: Shiloh Darnell MD Specialty: HOSPITALIST, Internal Medicine Author Type: Physician    Filed: 2023 12:47 PM Date of Service: 2023 12:44 PM Status: Signed    : Shiloh Darnell MD (Physician)         [facility]     Discharge Summary    Mark Espinoza Patient Status:  Inpatient    10/16/1975 MRN [id number]   Location [facility] Attending Shiloh Darnell MD   Hosp Day # 4 PCP No primary care provider on file. Date of Admission: 2023    Date of Discharge: 2023    Admitting Diagnosis: Choledocholithiasis [K80.50]    Discharge Diagnosis: Patient Active Problem List:     Seasonal allergic rhinitis     Vertebral artery dissection (HCC)     Carotid stenosis, asymptomatic, bilateral     Hypothyroidism     Hypermetropia, bilateral     Psychophysiological insomnia     Breast nodule     History of Hashimoto thyroiditis     Panic disorder     Weight loss, abnormal     Cigarette smoker     Toxic multinodular goiter     Hypercholesterolemia     Abdominal pain, acute     Nausea     Bloating     Change in bowel habits     Generalized anxiety disorder     Major depressive disorder     Generalized abdominal pain     Adnexal cyst     Choledocholithiasis     Cholecystitis      Reason for Admission:     Choledocholithiasis    Physical Exam:     General: No acute distress. Alert ,         Respiratory: Clear to auscultation bilaterally. No wheezes. No rhonchi.   Cardiovascular: S1, S2. Regular rate and rhythm. No murmurs, rubs or gallops. Abdomen: Soft, nontender, nondistended. Positive bowel sounds. No rebound or guarding. Neurologic: No focal neurological deficits. Musculoskeletal: Moves all extremities. Extremities: No edema. Hospital Course:   #Cholelithiasis and possible biliary colic.   -s/p lap tuan  - drain out  - still in pain  - ambulate  - encourage PO  - Pain meds  - home tomorrow. Matthew Garcia MD, [employer], FACP    History of Present Illness:   Per admitting attending:   Patient is a 66-year-old  female who was hospitalized at [facility] 2 days ago with right upper quadrant abdominal pain radiating to her back. She had workup including gallbladder ultrasound which showed cholelithiasis. Also, she had nuclear medicine gallbladder scan, which was negative. She could not tolerate an MRCP, and she signed out against medical advice today and came into [facility]. Liver function tests showed AST and  and 287, alkaline phosphatase 124. Lipase was negative. CBC and chemistry were unremarkable. Urinalysis showed no evidence of urinary tract infection. MRCP of the abdomen was ordered, and patient will be admitted to the hospital for further management. Disposition: Home or Self Care    Discharge Condition: Good    Discharge Medications: Current Discharge Medication List    START taking these medications    HYDROcodone-acetaminophen 5-325 MG Oral Tab  Take 1 tablet by mouth every 6 (six) hours. Qty: 21 tablet Refills: 0      CONTINUE these medications which have NOT CHANGED    LORazepam 2 MG Oral Tab  Take 1 tablet (2 mg total) by mouth 3 (three) times daily as needed for Anxiety. cyanocobalamin 1000 MCG Oral Tab  Take 1 tablet (1,000 mcg total) by mouth daily. FLUoxetine HCl 60 MG Oral Tab  Take 60 mg by mouth daily. diclofenac 50 MG Oral Tab EC  Take 1 tablet (50 mg total) by mouth daily.     levothyroxine 75 MCG Oral Tab  Take 1 tablet (75 mcg total) by mouth before breakfast.    ergocalciferol 1.25 MG (75669 UT) Oral Cap  Take 1 capsule (50,000 Units total) by mouth once a week. Qty: 10 capsule Refills: 0          Total dc time> 30 min    Lindsey Pond MD  8/20/2023  12:44 PM     Hospital Discharge Diagnoses:  Choledocholithiasis    Lace+ Score: 52  59-90 High Risk  29-58 Medium Risk  0-28   Low Risk. TCM Follow-Up Recommendation:  LACE 29-58:  Moderate Risk of readmission after discharge from the hospital.       Electronically signed by Gayatri Yu MD on 8/20/2023 12:47 PM         REVIEWER COMMENTS

## 2023-08-21 NOTE — PROGRESS NOTES
TCM chart review. No TCM as patient follows with outside [facility] PCP. Encounter closing.

## 2023-08-25 ENCOUNTER — TELEPHONE (OUTPATIENT)
Dept: SURGERY | Facility: CLINIC | Age: 48
End: 2023-08-25

## 2023-08-25 NOTE — TELEPHONE ENCOUNTER
Advised patient to take Motrin and Tylenol, to wear a binder, we will provide one if she does not have. Also advised patient to ambulate. Verbal understanding received.

## 2023-10-20 ENCOUNTER — TELEPHONE (OUTPATIENT)
Dept: ENDOCRINOLOGY CLINIC | Facility: CLINIC | Age: 48
End: 2023-10-20

## 2023-10-20 NOTE — TELEPHONE ENCOUNTER
Pt states that she wasn't able to get a follow up appointment before the endo of the year so she went to see her PCP. PCP office ordered TSH levels. On 9/13/23 TSH was at 5.50 and was given Synthroid 50 mcg. Then on 10/18/23 TSH increased to 20.73 and PCP increased Synthroid to 88 mcg. Pt is wanting to discuss these results with MD or RN.   Please call

## 2023-10-25 NOTE — TELEPHONE ENCOUNTER
Called patient and obtained information of PCP    Dr. Arina Lainez  [phone]    Pt asked RN about thyroid hormones and explained the a low TSH and a high TSH. Pt is concern about the value as she has never seen her TSH this high. Explained to her that a high TSH means she's not getting enough thyroid hormone replacement. Pt confirmed with RN that she's taking her thyroid medication first thing in the morning and waiting 1 hour before eating. Screened for acute symptoms and the only symptoms pt noticed is 4 lb weight gain in 1 month, dry skin on her legs and constipation. Per patient, after seeing Dr. Eleanor Gutierrez in Aug, she saw her PCP September and ran thyroid labs and adjusted her dose from 75 mcg to 50 mcg and now based on the blood test from last week, increased it to 88 mcg daily, which she is now taking. RN informed patient to take this dose for now until she hears back from the office. Pt voiced understanding and will wait for staff to call back. Called PCP office and spoke to [facility] and will fax lab results. RN will check fax tomorrow upon return to [facility] to check on fax.

## 2023-10-26 NOTE — TELEPHONE ENCOUNTER
Received fax from [address] [place] [address] of pt's most recent TSH result, collected on 10/18/23, placed in providers folder for review.

## 2023-10-26 NOTE — TELEPHONE ENCOUNTER
Since PCP recently increased dose, let us cpm , please repeat TSH with reflex in 6 weeks after the start of new dose    Symptoms of over replacement; anxiety, weight loss, diarrhea, lack of sleep  To call if she experiences any    thanks

## 2023-10-26 NOTE — TELEPHONE ENCOUNTER
Called PCP's office to request the TSH result from September for comparison and trends on TSH. RN received both TSH results from 9/13/23 and 10/19/23    8/4/23 (from Epic) TSH 0.973 (0.358-3.740)    09/13/23: TSH 5.50 (0.30-5.33)    10/19/23: TSH 20.73 (0.30-5.33)    Hard copy of the fax placed in provider's folder. PCP adjusted patient's dose from 50 mcg to 88 mcg daily per patient's report. Please see prior messages on this thread for further information. Pt is not skipping medications and taking it appropriately, pt also denies being on biotin.

## 2024-02-29 ENCOUNTER — TELEPHONE (OUTPATIENT)
Dept: ENDOCRINOLOGY CLINIC | Facility: CLINIC | Age: 49
End: 2024-02-29

## 2024-02-29 DIAGNOSIS — E03.9 HYPOTHYROIDISM, UNSPECIFIED TYPE: Primary | ICD-10-CM

## 2024-02-29 NOTE — TELEPHONE ENCOUNTER
Called and spoke to patient, informed the labs have been ordered and she can get the labs done. Patient understanding.

## 2024-02-29 NOTE — TELEPHONE ENCOUNTER
Pt states that she has been kind of shaky in the morning and would like to have order for thyroid testing.  Please call.

## 2024-02-29 NOTE — TELEPHONE ENCOUNTER
Dr. Betancur, for Dr. Morales (off) Patient c/o feeling shaky in the mornings x 2 weeks. States she has also been more anxious and restless. As well as more fatigue. Patient did state she recently lost her father so she is unsure if anxious and restlessness is due to this. Confirmed patient is currently taking Synthroid 88mg daily. Patient would like to have labs ordered to check thyroid levels. Please advise. Thank you.

## 2024-04-02 ENCOUNTER — TELEPHONE (OUTPATIENT)
Dept: ENDOCRINOLOGY CLINIC | Facility: CLINIC | Age: 49
End: 2024-04-02

## 2024-04-02 NOTE — TELEPHONE ENCOUNTER
Pt states she has [employer] insurance and cannot get labs done at [facility].  Pt would like to know what labs were ordered and fi this office knows where she can go.  Please call.

## 2024-04-04 NOTE — TELEPHONE ENCOUNTER
Called pt and notified that she must call insurance to find lab locations that will accept her insurance. Advised that [facility] might take her insurance but will have to call. If they do accept her insurance she can provide fax number to send labs over.   Also advised pt that we can send lab orders to her [employer] where she can print and take to the lab. Pt verbalized understanding and requested that lab orders be sent via [employer].     [employer] sent with lab orders.

## 2024-04-10 ENCOUNTER — TELEPHONE (OUTPATIENT)
Dept: ENDOCRINOLOGY CLINIC | Facility: CLINIC | Age: 49
End: 2024-04-10

## 2024-04-10 NOTE — TELEPHONE ENCOUNTER
Pt requesting to speak with RN..  She states that [facility] has faxed recent lab results to MD for her to review.  Pt states that they are trying to change medication and would like to discuss these changes.  Please call

## 2024-04-10 NOTE — TELEPHONE ENCOUNTER
Labs received from [facility] and placed in folder for Dr Morales to review.  Patient notified that faxed was received. Instructed patient to wait for Dr Morales's recommendations. Patient voiced understanding.

## 2024-04-11 NOTE — TELEPHONE ENCOUNTER
Please notify patient that lab results have been received and will be reviewed by Dr. Morales tomorrow upon her return.     Thank you!

## 2024-04-12 NOTE — TELEPHONE ENCOUNTER
Pt calling again.  She is wanting a call back today.  She states that her PCP is trying to raise her dose for Synthroid to 100 mg.    Please call

## 2024-04-12 NOTE — TELEPHONE ENCOUNTER
Tried calling patient twice--> no answer  Please book a video visit with me at lunch and I can review abnormal results  Thanks

## 2024-04-15 NOTE — TELEPHONE ENCOUNTER
"  Hema Patricia presented for a  Medicare AWV and comprehensive Health Risk Assessment today. The following components were reviewed and updated:    · Medical history  · Family History  · Social history  · Allergies and Current Medications  · Health Risk Assessment  · Health Maintenance  · Care Team         ** See Completed Assessments for Annual Wellness Visit within the encounter summary.**         The following assessments were completed:  · Living Situation  · CAGE  · Depression Screening  · Timed Get Up and Go  · Whisper Test  · Cognitive Function Screening  · Nutrition Screening  · ADL Screening  · PAQ Screening        Vitals:    01/28/22 0758   BP: 110/74   Pulse: 67   Resp: 20   Temp: 97.8 °F (36.6 °C)   TempSrc: Tympanic   SpO2: 97%   Weight: 109.2 kg (240 lb 13.6 oz)   Height: 6' 1" (1.854 m)     Body mass index is 31.78 kg/m².  Physical Exam  Constitutional:       General: He is not in acute distress.     Appearance: Normal appearance. He is well-developed and well-nourished. He is not ill-appearing, toxic-appearing or diaphoretic.   HENT:      Head: Normocephalic and atraumatic.      Right Ear: External ear normal.      Left Ear: External ear normal.      Nose: Nose normal.   Eyes:      Conjunctiva/sclera: Conjunctivae normal.   Neck:      Vascular: No carotid bruit.   Cardiovascular:      Rate and Rhythm: Normal rate and regular rhythm.      Pulses: Normal pulses.      Heart sounds: Normal heart sounds. No murmur heard.  No friction rub. No gallop.    Pulmonary:      Effort: Pulmonary effort is normal. No respiratory distress.      Breath sounds: Normal breath sounds. No wheezing or rales.   Chest:      Chest wall: No tenderness.   Abdominal:      General: Bowel sounds are normal. There is no distension.      Palpations: Abdomen is soft.      Tenderness: There is no abdominal tenderness. There is no rebound.      Hernia: No hernia is present.   Musculoskeletal:         General: No tenderness or edema. " I called the patient. Booked video visit for tomorrow to discuss results.   Provided video visit instructions.   Patient states she has new insurance, asked [facility] staff to contact patient to update.     YARELY flynn   Normal range of motion.      Cervical back: Normal range of motion and neck supple. No tenderness.   Lymphadenopathy:      Cervical: No cervical adenopathy.   Skin:     General: Skin is warm and dry.   Neurological:      Mental Status: He is alert and oriented to person, place, and time.      Cranial Nerves: No cranial nerve deficit.   Psychiatric:         Mood and Affect: Mood and affect and mood normal.         Behavior: Behavior normal.               Diagnoses and health risks identified today and associated recommendations/orders:    1. Encounter for preventive health examination  Screenings performed, as noted above.  Personal preventative testing needs reviewed.     2. Major depressive disorder, recurrent, in remission, unspecified  Monitored/treated on meds, continue the same tx, stable, follow up with pcp    3. History of atrial fibrillation  Monitored/treated on meds, continue the same tx, stable, follow up with pcp, regular rhythm today    4. Mixed hyperlipidemia  Monitored/treated on meds, continue the same tx, stable, follow up with pcp    5. Essential hypertension  Monitored/treated on meds, continue the same tx, stable, follow up with pcp    6. Hypothyroidism due to acquired atrophy of thyroid  Monitored/treated on meds, continue the same tx, stable, follow up with pcp    7. Prediabetes  Monitored/treated on meds, continue the same tx, stable, follow up with pcp, encouraged healthy diet and exercise      Provided Hema with a 5-10 year written screening schedule and personal prevention plan. Recommendations were developed using the USPSTF age appropriate recommendations. Education, counseling, and referrals were provided as needed. After Visit Summary printed and given to patient which includes a list of additional screenings\tests needed.    No follow-ups on file.    Luca Henriquez NP  I offered to discuss advanced care planning, including how to pick a person who would make decisions for you if you  were unable to make them for yourself, called a health care power of , and what kind of decisions you might make such as use of life sustaining treatments such as ventilators and tube feeding when faced with a life limiting illness recorded on a living will that they will need to know. (How you want to be cared for as you near the end of your natural life)     X Patient is interested in learning more about how to make advanced directives.  I provided them paperwork and offered to discuss this with them.

## 2024-04-16 ENCOUNTER — TELEPHONE (OUTPATIENT)
Dept: ENDOCRINOLOGY CLINIC | Facility: CLINIC | Age: 49
End: 2024-04-16

## 2024-04-16 ENCOUNTER — TELEMEDICINE (OUTPATIENT)
Dept: ENDOCRINOLOGY CLINIC | Facility: CLINIC | Age: 49
End: 2024-04-16

## 2024-04-16 DIAGNOSIS — E55.9 VITAMIN D DEFICIENCY: ICD-10-CM

## 2024-04-16 DIAGNOSIS — E03.9 HYPOTHYROIDISM, UNSPECIFIED TYPE: Primary | ICD-10-CM

## 2024-04-16 RX ORDER — LEVOTHYROXINE SODIUM 100 MCG
100 TABLET ORAL
Qty: 90 TABLET | Refills: 0 | Status: SHIPPED | OUTPATIENT
Start: 2024-04-16 | End: 2024-04-17

## 2024-04-16 NOTE — TELEPHONE ENCOUNTER
Can we please attach an order for TSH with reflex and vitamin D 25 OH  on my chart for the patient?   Thanks

## 2024-04-16 NOTE — PROGRESS NOTES
Ligia Gannon verbally consents to a video visit on 4/16/2024     Patient understands and accepts financial responsibility for any deductible, co-insurance and/or co-pays associated with this service.  Patient has been referred to the [facility] website at [email or web address] to review the yearly Consent to Treat document.        This visit is conducted using Telemedicine with live, interactive video and audio      Return Office Visit     CHIEF COMPLAINT:    Hypothyroidism   Vitamin D deficiency    HISTORY OF PRESENT ILLNESS:  Ligia Gannon is a 48 year old female who presents for follow up for hypothyroidism.     She was diagnosed with subclinical hyperthyroidism.   She is s/p POTTS in 8/2018.   She was started on LT 4  She has been on LT4 88 mcg daily   Reports compliance, except that she was out of the medication for about 5-7 days about a month prior to blood work     ROS as below  Reports fatigue and body aches and joint pains    CURRENT MEDICATION:    Current Outpatient Medications   Medication Sig Dispense Refill    SYNTHROID 100 MCG Oral Tab Take 1 tablet (100 mcg total) by mouth before breakfast. 90 tablet 0    HYDROcodone-acetaminophen 5-325 MG Oral Tab Take 1 tablet by mouth every 6 (six) hours. 21 tablet 0    ondansetron (ZOFRAN) 4 mg tablet Take 1 tablet (4 mg total) by mouth every 8 (eight) hours as needed for Nausea. 15 tablet 0    LORazepam 2 MG Oral Tab Take 1 tablet (2 mg total) by mouth 3 (three) times daily as needed for Anxiety.      cyanocobalamin 1000 MCG Oral Tab Take 1 tablet (1,000 mcg total) by mouth daily.      FLUoxetine HCl 60 MG Oral Tab Take 60 mg by mouth daily.      diclofenac 50 MG Oral Tab EC Take 1 tablet (50 mg total) by mouth daily.           ALLERGY:  Allergies   Allergen Reactions    Morphine HIVES and SWELLING       PAST MEDICAL, SOCIAL AND FAMILY HISTORY:  See past medical history marked as reviewed.  See past surgical history marked as reviewed.  See past family history  marked as reviewed.  See past social history marked as reviewed.    ASSESSMENTS:     REVIEW OF SYSTEMS:  Constitutional: Negative for:  fever, + fatigue, cold/heat intolerance,  + weight loss  Eyes: Negative for:  Visual changes, proptosis, blurring  ENT: Negative for:  dysphagia, neck swelling, dysphonia  Respiratory: Negative for:  dyspnea, cough  Cardiovascular: Negative for:  chest pain, palpitations, orthopnea  GI: Negative for:  abdominal pain, nausea, vomiting, + IBS, bleeding  Neurology: Negative for: headache, numbness, weakness  Genito-Urinary: Negative for: dysuria, frequency  Psychiatric: Negative for:  depression, + chronic anxiety,stable  Hematology/Lymphatics: Negative for: bruising, lower extremity edema  Endocrine: Negative for: polyuria, polydypsia  Skin: Negative for: rash, blister, cellulitis,       PHYSICAL EXAM:         General Appearance:  alert, well developed, in no acute distress  Head: Atraumatic  Eyes:  normal conjunctivae, sclera., normal sclera and normal pupils  Throat/Neck: normal sound to voice. Normal hearing, normal speech  Respiratory:  Speaking in full sentences, non-labored. no increased work of breathing, no audible wheezing    Psychiatric:  oriented to time, self, and place  Extremities: no obvious extremity swelling, no lesions        DATA:     Pertinent data reviewed  TSH 22.4 (0.34 -6.6) given for scanning    ASSESSMENT AND PLAN:    Patient is a 47 year old female with h/o subclinical hyperthyroidism.   She is s/p POTTS.    She has been out of synthroid but only for 5-7 days about a month prior to blood work , reports compliance otherwise      PLAN:   LT4 88--> 100 mcg daily for now  Labs in 6 weeks    The patient is aware that she needs to take LT 4 for the rest of her life; every am one hour before breakfast and atleast four hours apart from other meds especially calcium, iron, multivitamins containing Ca/iron  Stressed the importance of compliance with meds  Side effects  of noncompliance including the development of myxedema coma and death discussed.    She has a thyroid remnant and two sub cm thyroid nodules ( left sided) on US from 3/2019  We will follow with US   Re ordered    H/o Vitamin D deficiency  Not on vitamin D   Will check levels        RTC in 9 months  Call for results    Orders Placed This Encounter   Procedures    TSH W Reflex To Free T4    Vitamin D [E]       Kandi Morales MD          Please note that the following visit was completed using two-way, real-time interactive audio and video communication.  This has been done in good carrie to provide continuity of care in the best interest of the provider-patient relationship.  There are limitations of this visit as no physical exam could be performed.  Every conscious effort was taken to allow for sufficient and adequate time.  This billing was spent on reviewing labs, medications, radiology tests and decision making.  Appropriate medical decision-making and tests are ordered as detailed in the plan of care above.”

## 2024-04-23 ENCOUNTER — TELEPHONE (OUTPATIENT)
Dept: ENDOCRINOLOGY CLINIC | Facility: CLINIC | Age: 49
End: 2024-04-23

## 2024-04-23 NOTE — TELEPHONE ENCOUNTER
Called pt and she states that she currently feels like she is very hot and that her eyes are burning but has no fever, however, she cannot check temperature with a thermometer since she does not have one. She states she is very cold, she is shaking and feels very weak and fatigued. Pt states she also has heart palpitations.   She states that she has had these symptoms for about 3 weeks but assumed she had a cold/flu. She has been taking Tylenol for cold, advil for cold, ibuprofen, doxycycline, and a nasal spray but it has not helped with the symptoms. Pt thinks this could be a thyroid storm and not a cold/flu.     Pt has been taking 100 mg synthroid for 3-4 days.   Advised pt that if she continues with symptoms or begins to lose consciousness/confusion to go to ER. Pt verbalized understanding. She is currently coherent and lucid, but does sound fatigued.

## 2024-04-23 NOTE — TELEPHONE ENCOUNTER
Called pt and advised on the below. Reiterated that if symptoms continue or worsen she is to go to ER/urgent care. Pt verbalized understanding.

## 2024-04-23 NOTE — TELEPHONE ENCOUNTER
Patient is calling with complaints for feeling weak and shaky.  She states everything hurts.  Please call

## 2024-04-23 NOTE — TELEPHONE ENCOUNTER
I am sorry to hear that   Since she just switched the dose of thyroid medication, I recommend repeating labs in atleast 5-6 weeks since they can change time to change  I recommend she also reach out to her PCP for further evaluation of other potential causes of her symptoms  Agree with ER/ immediate care in case of any worsening   Thanks

## 2024-11-26 ENCOUNTER — TELEPHONE (OUTPATIENT)
Dept: GASTROENTEROLOGY | Facility: CLINIC | Age: 49
End: 2024-11-26

## 2024-11-26 NOTE — TELEPHONE ENCOUNTER
Patient outreach message received:    Recall colon in 3 years per Dr Tracey. Colon done 02/05/2022  Next due 02/05/2022    Recall reminder letter sent out to patient via [employer].

## 2025-06-13 ENCOUNTER — TELEPHONE (OUTPATIENT)
Facility: CLINIC | Age: 50
End: 2025-06-13

## 2025-06-13 ENCOUNTER — OFFICE VISIT (OUTPATIENT)
Facility: CLINIC | Age: 50
End: 2025-06-13

## 2025-06-13 VITALS
HEIGHT: 57 IN | DIASTOLIC BLOOD PRESSURE: 75 MMHG | SYSTOLIC BLOOD PRESSURE: 117 MMHG | WEIGHT: 98 LBS | BODY MASS INDEX: 21.14 KG/M2 | HEART RATE: 71 BPM

## 2025-06-13 DIAGNOSIS — R19.4 CHANGE IN BOWEL HABITS: ICD-10-CM

## 2025-06-13 DIAGNOSIS — Z87.19 HISTORY OF DIVERTICULITIS: Primary | ICD-10-CM

## 2025-06-13 DIAGNOSIS — K62.9 RECTAL LESION: ICD-10-CM

## 2025-06-13 DIAGNOSIS — R15.2 FECAL URGENCY: ICD-10-CM

## 2025-06-13 DIAGNOSIS — K57.92 DIVERTICULITIS: Primary | ICD-10-CM

## 2025-06-13 PROCEDURE — 99214 OFFICE O/P EST MOD 30 MIN: CPT | Performed by: NURSE PRACTITIONER

## 2025-06-13 RX ORDER — SULFAMETHOXAZOLE AND TRIMETHOPRIM 800; 160 MG/1; MG/1
1 TABLET ORAL 2 TIMES DAILY
COMMUNITY
Start: 2025-06-09 | End: 2025-06-19

## 2025-06-13 RX ORDER — LEVOTHYROXINE SODIUM 88 UG/1
1 CAPSULE ORAL
COMMUNITY
Start: 2025-06-04

## 2025-06-13 NOTE — PATIENT INSTRUCTIONS
-try probiotic such as Align or Florastor  -schedule with surgery for rectal skin tag   -try a fiber supplement again, list below:    Fiber Supplements (pick one)  -Metamucil or Konsyl (psyllium- both soluble & insoluble) *  -Fibercon (polycarbophil- mostly insoluble)  -Citrucel (methylcellulose mostly soluble)   -Benefiber (wheat dextrin- mostly soluble)    Soluble better for diarrhea, insoluble better for constipation     ------------------------------------------------------------------------    1. Schedule colonoscopy with Dr. Tracey - urgent  Diagnosis: diverticulitis, change in bowel habits  Sedation: MAC  Prep: split dose golytely    2.  bowel prep from pharmacy   You can pick the bowel prep up now and store in a cool, dry place in your home until your scheduled bowel prep start date.    3. Continue all medications as normal for your procedure. DO NOT TAKE: Any form of alcohol, recreational drugs and any forms of erectile dysfunction medications 24 hours prior to procedure.    4. Read all bowel prep instructions carefully. Bowel prep instructions can also be found online at:  [email or web address]     5. AVOID seeds, nuts, popcorn, raw fruits and vegetables for 5 days before procedure    6. If you start any NEW medication after your visit today, please notify us. Certain medications (like iron or weight loss medications) will need to be held before the procedure, or the procedure cannot be performed safely.

## 2025-06-13 NOTE — TELEPHONE ENCOUNTER
Scheduled for:  Colonoscopy [id number]  Provider Name:  Dr Tracey  Date:  10/2/2025  Location:  [facility]  Sedation:  MAC  Time:  1:10 pm. (pt is aware that ENDO will call the day before the procedure to confirm arrival time)  Prep:  Golytely  Meds/Allergies Reconciled?:  JIL Garcia reviewed  Diagnosis with codes:    Diverticulitis Z87.19  Change in bowel habits R19.4  Was patient informed to call insurance with codes (Y/N):  Yes  Referral sent?:  Referral was sent at the time of electronic surgical scheduling.  [facility] or [facility] notified?:  I sent an electronic request to Endo Scheduling and received a confirmation today.  Medication Orders:  Patient is aware to NOT take iron pills, herbal meds and diet supplements for 7 days before exam. Also to NOT take any form of alcohol, recreational drugs and any forms of ED meds 24-72 hours before exam.   Misc Orders:  N/A   Further instructions given by staff:  I discussed the prep instructions with the patient which she verbally understood. I provided patient with prep instruction's sheet in office.      Patient was informed about the new cancellation policy for his/her procedure. Patient was also given a copy of the cancellation policy at the time of the appointment and verbalized understanding.      NOTE: Patient was added to the wait list for a sooner date.

## 2025-06-13 NOTE — PROGRESS NOTES
[facility] - Gastroenterology                                                                                                      Clinic Follow-up Visit    Chief Complaint   Patient presents with    Hospital F/U     Diverticulitis           Subjective/HPI:   Ligia Gannon is a 49 year old year old female with a past medical history of CAD, hypothyroidism, VEE, cholelithiasis.     Last visit with Lauren RAMOS in 2022:  #rectal pain  #urgency  She is here today for eval of her c/o rectal pain with urgency with bm.  She denies diarrhea, nocturnal bm. She endorses bms after meals and stools. Occasional blood and mucus with bm and think may be 2/2 ih, constipation. She does not agree. Plan for kub to eval stool burden, labs, stool testing. Consider starting linzess pending results.      #elevated AST  #cholelithiasis  She is here today for hospital f/u.  Cln/egd 2/0220 remarkable for multiple colon polyps removed, path typical pre-cancerous type. IH. Mild non-specific inflammation in ileum thought to be nsaid related.  Duodenal bx (-) celiac. Unremarkable random cln biopsy. Today reports symptoms of urgency with bm and rectal pain/pressure with bm.  Has occasional hematochezia, mucus. No diarrhea. No nocturnal bm. No melena.   Mild elevation in AST noted on CMP (2/2022). CT (2/2022) c/w cholelithiasis. ultrasound (2/2022) cholelithiasis with findings concerning for acute cholecystitis. HIDA unremarkable (2/2022).      -start fibercon or citrucel once daily in evening  -miralax in am  -squatty potty  -pelvic floor dysfunction physical therapy  -small portions  -avoid fatty/greasy food  -monitor labs for need for further work-up  -er if condition decline  -labs  -stool testing     Today:  Was admitted at [facility] 4/11/25 for LLQ pain- dx with descending diverticulitis. Treated and LLQ pain has resolved.  Had gallbladder out in  2023 since last visit.  Overall GI symptoms are unchanged- always straining, stools looks skinny, always seeing blood and mucous in stool. Sometimes only passing mucous.   Internal hemorrhoids on last cln- using prepH cream and suppositories as needed  Takes Tylenol or Motrin frequently for joint pain or headaches.   +smoker   Currently on Bactrim for a skin abscess.   Daughter has UC.  Tried       PRIOR GI WORK UP:   CLN/EGD 2/2022: Dr. Tracey      Impression:  - colon polyps x 5  - terminal ileal erosions   - diverticulosis   - small internal hemorrhoids  - duodenitis     Recommendations:  - Post polypectomy instructions given  - Repeat colonoscopy in 3- 7 years  - High fiber diet for diverticular disease  - Symptomatic treatment of hemorrhoids  - Follow up on biopsies of TI, would have the patient avoid NSAIDs.  - Follow up on random colon and duodenal biopsies  - No specific etiology noted for significant abdominal pain, gallstones noted on the CT scan but her pain profile/clinical presentation does not suggest biliary colic.  Subtle small focal erosions noted x2 in the terminal ileum, follow-up in these biopsies but Crohn's disease thought to be less likely however she does have a family history of IBD.  - Advance diet  Final Diagnosis:      A. Ileum; biopsy:  Small intestinal mucosa with mild nonspecific acute inflammation, see comment.     B. Colon; biopsy:  Colonic mucosa with no significant pathologic change.  No evidence of lymphocytic or collagenous colitis.     C. Sigmoid colon polyps x5:  Tubular adenoma fragments x4.  Hyperplastic polyp fragments x3.     D. Duodenum; biopsy:  Small intestinal mucosa with no significant pathologic change.   Preserved villous architecture.       Wt Readings from Last 6 Encounters:   06/13/25 98 lb (44.5 kg)   08/16/23 98 lb 1.6 oz (44.5 kg)   08/04/23 92 lb (41.7 kg)   08/16/22 99 lb (44.9 kg)   03/23/22 105 lb (47.6 kg)   02/12/22 111 lb (50.3 kg)        History,  Medications, Allergies, ROS:      Past Medical History[1]   Past Surgical History[2]   Family History[3]   Social History: Short Social Hx on File[4]     Medications (Active prior to today's visit):  Current Medications[5]    Allergies:  Allergies[6]    ROS:   CONSTITUTIONAL: negative for fevers, chills, sweats  EYES Negative for scleral icterus or redness, and diplopia  HEENT: Negative for hoarseness  RESPIRATORY: Negative for cough and severe shortness of breath  CARDIOVASCULAR: Negative for crushing sub-sternal chest pain  GASTROINTESTINAL: See HPI  GENITOURINARY: Negative for dysuria  MUSCULOSKELETAL: Negative for arthralgias and myalgias  SKIN: Negative for jaundice, rash or pruritus  NEUROLOGICAL: Negative for dizziness and headaches  BEHAVIOR/PSYCH: Negative for psychotic behavior    PHYSICAL EXAM:   Blood pressure 117/75, pulse 71, height 4' 9\" (1.448 m), weight 98 lb (44.5 kg), last menstrual period 05/22/2025, not currently breastfeeding.    Gen- alert, no acute distress, well-nourished  Rectal: skin tag w/ fibrotic/hard/white tissue underneath, not painful to palpation  MSK: no erythema, warmth, no swelling of joints  Skin- No jaundice, erythema, rashes or lesions  Hematologic- no bleeding or bruising  Neuro- Alert and interactive, JONES   Psych - appropriate mood & affect    Labs/Imaging:   CT AP 4/11/25:  Findings:  Limitations: None.  Visualized thorax: Normal.  Abdominal wall: No abnormalities identified.  Vasculature: Normal.  Upper GI: Normal distal esophagus, stomach and duodenum. No evidence of obstruction.  Liver: Small intrahepatic cysts.  Biliary: The gallbladder has been removed.  Spleen: Normal size and configuration. No focal lesions.  Pancreas: Normal enhancement without focal lesion.  Adrenal glands: Unremarkable.  Kidneys: No renal stones or hydronephrosis.  Urinary bladder: Unremarkable.  Reproductive: No pelvic mass.  Lower GI: At the left upper quadrant,, there appears to be an inflamed  diverticulum along the medial aspect of the proximal descending colon with moderate adjacent fat stranding. Appendix normal.  Other: Trace ascites in the left upper quadrant.  Bones: No acute osseous pathology.    Impression: Acute descending colonic diverticulitis at the left upper quadrant. The appearance is slightly irregular and follow-up endoscopy is recommended to ensure the absence of an underlying neoplasm given the patient age.     MRI/MRCP 2023  Impression   CONCLUSION:  1. A 6 mm calculus in the gallbladder neck with gallbladder distension and gallbladder sludge associated with pericholecystic fluid compatible with cholecystitis.  No evidence of choledocholithiasis.        .  ASSESSMENT/PLAN:   Ligia Gannon is a 49 year old year old female with a past medical history of CAD, hypothyroidism, VEE, cholelithiasis.     1. History of diverticulitis    2. Change in bowel habits    3. Rectal lesion  - Surgery Referral - In Network     Patient with symptoms largely unchanged since 2022 visit. Discussed that fecal urgency may actually be overflow diarrhea and mucous may be related to constipation. Miralax caused worsening cramping. Does not remember if she tried fiber supplement as directed previously, will try again. Tried pelvic floor therapy but did not find it effective.  Rectal exam with skin tag and scarring, patient states  said it appears to be cracked open when he last checked- likely a fissure? Not painful today. Surgery referral given.   Repeat CLN due to new diverticulitis diagnosis.       Patient Instructions   -try probiotic such as Align or Florastor  -schedule with surgery for rectal skin tag   -try a fiber supplement again, list below:    Fiber Supplements (pick one)  -Metamucil or Konsyl (psyllium- both soluble & insoluble) *  -Fibercon (polycarbophil- mostly insoluble)  -Citrucel (methylcellulose mostly soluble)   -Benefiber (wheat dextrin- mostly soluble)    Soluble better for  diarrhea, insoluble better for constipation     ------------------------------------------------------------------------    1. Schedule colonoscopy with Dr. Tracey - urgent  Diagnosis: diverticulitis, change in bowel habits  Sedation: MAC  Prep: split dose golytely    2.  bowel prep from pharmacy   You can pick the bowel prep up now and store in a cool, dry place in your home until your scheduled bowel prep start date.    3. Continue all medications as normal for your procedure. DO NOT TAKE: Any form of alcohol, recreational drugs and any forms of erectile dysfunction medications 24 hours prior to procedure.    4. Read all bowel prep instructions carefully. Bowel prep instructions can also be found online at:  [email or web address]     5. AVOID seeds, nuts, popcorn, raw fruits and vegetables for 5 days before procedure    6. If you start any NEW medication after your visit today, please notify us. Certain medications (like iron or weight loss medications) will need to be held before the procedure, or the procedure cannot be performed safely.             Orders This Visit:  No orders of the defined types were placed in this encounter.      Meds This Visit:  Requested Prescriptions     Signed Prescriptions Disp Refills    polyethylene glycol, PEG 3350-KCl-NaBcb-NaCl-NaSulf, 236 g Oral Recon Soln 1 each 0     Sig: Take 4,000 mL by mouth As Directed. Take 2,000 mL the night before your procedure and 2,000 mL the morning of your procedure.       Imaging & Referrals:  SURGERY - INTERNAL     JIL Garcia    [facility] Gastroenterology  6/13/2025    The dictation was partially prepared using Dragon Medical voice recognition software. As a result, errors may occur. When identified, these errors have been corrected. While every attempt is made to correct errors during dictation, discrepancies may still exist.            [1]   Past Medical History:   Generalized anxiety disorder    Hypercholesterolemia     Hypothyroidism    Major depressive disorder    Toxic multinodular goiter    Status post POTTS 8-18   [2]   Past Surgical History:  Procedure Laterality Date          x4    Colonoscopy N/A 2022    Procedure: COLONOSCOPY;  Surgeon: Shahriar Tracey MD;  Location: [facility] ENDOSCOPY    Thyroidectomy     [3]   Family History  Problem Relation Age of Onset    Breast Cancer Mother 60    Hypertension Mother     Cancer Mother 64        breast cancer    Breast Cancer Maternal Grandmother         50'S    Cancer Maternal Grandmother         breast cancer    Heart Disease Father         MI/PCI age 65    Hypertension Father     Ovarian Cancer Niece     Other (colitis) Daughter     Cancer Maternal Grandfather         pancreatic   [4]   Social History  Socioeconomic History    Marital status:    Tobacco Use    Smoking status: Every Day     Current packs/day: 1.00     Average packs/day: 1 pack/day for 25.0 years (25.0 ttl pk-yrs)     Types: Cigarettes    Smokeless tobacco: Never   Vaping Use    Vaping status: Never Used   Substance and Sexual Activity    Alcohol use: No    Drug use: No   Other Topics Concern    Caffeine Concern No     Comment: 1 to 2 in the morning of coffee     Exercise No     Social Drivers of Health     Food Insecurity: Low Risk  (2025)    Received from [facility] Food Security     Within the past 12 months, the food you bought just didn't last and you didn't have money to get more.: 3     Within the past 12 months, you worried that your food would run out before you got money to buy more.: 3   Transportation Needs: Not At Risk (2025)    Received from [facility] Transportation Needs     In the past 12 months, has lack of reliable transportation kept you from medical appointments, meetings, work or from getting things needed for daily living?: No   Stress: Stress Concern Present (2025)    Received from [facility]  Occupational Health - Occupational Stress Questionnaire     Feeling of Stress : To some extent   Housing Stability: Not At Risk (4/12/2025)    Received from [facility] Housing     What is your living situation today?: I have a steady place to live     Think about the place you live. Do you have problems with any of the following?: None of the above   [5]   Current Outpatient Medications   Medication Sig Dispense Refill    TIROSINT 88 MCG Oral Cap Take 1 capsule by mouth before breakfast.      sulfamethoxazole-trimethoprim -160 MG Oral Tab per tablet Take 1 tablet by mouth 2 (two) times daily.      polyethylene glycol, PEG 3350-KCl-NaBcb-NaCl-NaSulf, 236 g Oral Recon Soln Take 4,000 mL by mouth As Directed. Take 2,000 mL the night before your procedure and 2,000 mL the morning of your procedure. 1 each 0    LORazepam 2 MG Oral Tab Take 1 tablet (2 mg total) by mouth 3 (three) times daily as needed for Anxiety.      FLUoxetine HCl 60 MG Oral Tab Take 60 mg by mouth daily.      SYNTHROID 100 MCG Oral Tab Take 1 tablet (100 mcg total) by mouth before breakfast. (Patient not taking: Reported on 6/13/2025) 90 tablet 0    HYDROcodone-acetaminophen 5-325 MG Oral Tab Take 1 tablet by mouth every 6 (six) hours. (Patient not taking: Reported on 6/13/2025) 21 tablet 0    ondansetron (ZOFRAN) 4 mg tablet Take 1 tablet (4 mg total) by mouth every 8 (eight) hours as needed for Nausea. (Patient not taking: Reported on 6/13/2025) 15 tablet 0    cyanocobalamin 1000 MCG Oral Tab Take 1 tablet (1,000 mcg total) by mouth daily. (Patient not taking: Reported on 6/13/2025)      diclofenac 50 MG Oral Tab EC Take 1 tablet (50 mg total) by mouth daily. (Patient not taking: Reported on 6/13/2025)     [6]   Allergies  Allergen Reactions    Morphine HIVES and SWELLING

## 2025-07-16 ENCOUNTER — TELEPHONE (OUTPATIENT)
Facility: CLINIC | Age: 50
End: 2025-07-16

## 2025-07-16 NOTE — TELEPHONE ENCOUNTER
Called patient - rescheduled her colonoscopy from the wait list to 7/28/2025 at [facility].    Please refer to telephone encounter dated 6/13/2025 for scheduling orders.    Telephone encounter closed.

## 2025-07-16 NOTE — TELEPHONE ENCOUNTER
Patient states that she missed a call this morning and is requesting a call back.  She states that she is hoping it is because she is on the waiting list for a sooner colonoscopy.   Patient is currently scheduled for 10/2/25.    Please call

## 2025-07-28 ENCOUNTER — HOSPITAL ENCOUNTER (OUTPATIENT)
Facility: HOSPITAL | Age: 50
Setting detail: HOSPITAL OUTPATIENT SURGERY
Discharge: HOME | End: 2025-07-28
Attending: INTERNAL MEDICINE | Admitting: INTERNAL MEDICINE

## 2025-07-28 ENCOUNTER — ANESTHESIA EVENT (OUTPATIENT)
Dept: ENDOSCOPY | Facility: HOSPITAL | Age: 50
End: 2025-07-28
Payer: MEDICAID

## 2025-07-28 ENCOUNTER — ANESTHESIA (OUTPATIENT)
Dept: ENDOSCOPY | Facility: HOSPITAL | Age: 50
End: 2025-07-28
Payer: MEDICAID

## 2025-07-28 DIAGNOSIS — K57.92 DIVERTICULITIS: ICD-10-CM

## 2025-07-28 DIAGNOSIS — R19.4 CHANGE IN BOWEL HABITS: ICD-10-CM

## 2025-07-28 PROBLEM — Z87.19 HX OF DIVERTICULITIS OF COLON: Status: ACTIVE | Noted: 2025-07-28

## 2025-07-28 PROBLEM — Z12.11 COLON CANCER SCREENING: Status: ACTIVE | Noted: 2025-07-28

## 2025-07-28 LAB — B-HCG UR QL: NEGATIVE

## 2025-07-28 PROCEDURE — 45380 COLONOSCOPY AND BIOPSY: CPT | Performed by: INTERNAL MEDICINE

## 2025-07-28 RX ORDER — SODIUM CHLORIDE, SODIUM LACTATE, POTASSIUM CHLORIDE, CALCIUM CHLORIDE 600; 310; 30; 20 MG/100ML; MG/100ML; MG/100ML; MG/100ML
INJECTION, SOLUTION INTRAVENOUS CONTINUOUS
Status: DISCONTINUED | OUTPATIENT
Start: 2025-07-28 | End: 2025-07-28

## 2025-07-28 RX ORDER — LIDOCAINE HYDROCHLORIDE 10 MG/ML
INJECTION, SOLUTION EPIDURAL; INFILTRATION; INTRACAUDAL; PERINEURAL AS NEEDED
Status: DISCONTINUED | OUTPATIENT
Start: 2025-07-28 | End: 2025-07-28 | Stop reason: SURG

## 2025-07-28 RX ORDER — NALOXONE HYDROCHLORIDE 0.4 MG/ML
0.08 INJECTION, SOLUTION INTRAMUSCULAR; INTRAVENOUS; SUBCUTANEOUS ONCE AS NEEDED
Status: DISCONTINUED | OUTPATIENT
Start: 2025-07-28 | End: 2025-07-28

## 2025-07-28 RX ADMIN — LIDOCAINE HYDROCHLORIDE 50 MG: 10 INJECTION, SOLUTION EPIDURAL; INFILTRATION; INTRACAUDAL; PERINEURAL at 13:54:00

## 2025-07-28 RX ADMIN — SODIUM CHLORIDE, SODIUM LACTATE, POTASSIUM CHLORIDE, CALCIUM CHLORIDE: 600; 310; 30; 20 INJECTION, SOLUTION INTRAVENOUS at 14:21:00

## 2025-07-28 RX ADMIN — SODIUM CHLORIDE, SODIUM LACTATE, POTASSIUM CHLORIDE, CALCIUM CHLORIDE: 600; 310; 30; 20 INJECTION, SOLUTION INTRAVENOUS at 13:51:00

## 2025-07-28 NOTE — ANESTHESIA PREPROCEDURE EVALUATION
Anesthesia PreOp Note    HPI:     Ligia Gannon is a 49 year old female who presents for preoperative consultation requested by: Shahriar Tracey MD    Date of Surgery: 7/28/2025    Procedure(s):  COLONOSCOPY  Indication: Diverticulitis/ Change in bowel habits    Relevant Problems   No relevant active problems       NPO:  Last Liquid Consumption Date: 07/27/25  Last Liquid Consumption Time: 2300  Last Solid Consumption Date: 07/27/25  Last Solid Consumption Time: 1000  Last Liquid Consumption Date: 07/27/25          History Review:  Patient Active Problem List    Diagnosis Date Noted    Colon cancer screening 07/28/2025    Hx of diverticulitis of colon 07/28/2025    Choledocholithiasis 08/16/2023    Cholecystitis 08/16/2023    Generalized anxiety disorder 02/12/2022    Major depressive disorder 02/12/2022    Generalized abdominal pain 02/12/2022    Adnexal cyst 02/12/2022    Abdominal pain, acute     Nausea     Bloating     Change in bowel habits     Toxic multinodular goiter     Hypercholesterolemia     Cigarette smoker 03/02/2020    History of Hashimoto thyroiditis 12/03/2019    Panic disorder 12/03/2019    Weight loss, abnormal 12/03/2019    Breast nodule 11/18/2019    Psychophysiological insomnia 11/01/2019    Hypothyroidism 11/12/2018    Carotid stenosis, asymptomatic, bilateral 10/29/2018    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  Lab Results   Component Value Date    URINEPREG Negative 07/28/2025             Vital Signs:  Body mass index is 19.48 kg/m².   height is 1.448 m (4' 9\") and weight is 40.8 kg (90 lb).   Vitals:    07/21/25 1157   Weight: 40.8 kg (90 lb)   Height: 1.448 m (4'  9\")        Anesthesia Evaluation      No history of anesthetic complications   Airway   Mallampati: II  TM distance: >3 FB  Neck ROM: full  Dental      Pulmonary - negative ROS and normal exam   Cardiovascular - negative ROS and normal exam    ECG reviewed    Neuro/Psych    (+)   depression      GI/Hepatic/Renal - negative ROS     Endo/Other    (+) hypothyroidism  Abdominal  - normal exam                 Anesthesia Plan:   ASA:  2  Plan:   MAC  Informed Consent Plan and Risks Discussed With:  Patient and spouse  Discussed plan with:  Surgeon      I have informed Ligia ROSE Gannon and/or legal guardian or family member of the nature of the anesthetic plan, benefits, risks including possible dental damage if relevant, major complications, and any alternative forms of anesthetic management.   All of the patient's questions were answered to the best of my ability. The patient desires the anesthetic management as planned.  Candida Castillo CRNA  2025 1:30 PM  Present on Admission:  **None**           [1]   Past Medical History:   Generalized anxiety disorder    Hypercholesterolemia    Hypothyroidism    Major depressive disorder    Toxic multinodular goiter    Status post POTTS 8-18    Visual impairment   [2]   Past Surgical History:  Procedure Laterality Date          x4    Colonoscopy N/A 2022    Procedure: COLONOSCOPY;  Surgeon: Shahriar Tracey MD;  Location: [facility] ENDOSCOPY    Thyroidectomy     [3]   Medications Prior to Admission   Medication Sig Dispense Refill Last Dose/Taking    TIROSINT 88 MCG Oral Cap Take 1 capsule by mouth before breakfast.   2025    LORazepam 2 MG Oral Tab Take 1 tablet (2 mg total) by mouth as needed in the morning and 1 tablet (2 mg total) as needed at noon and 1 tablet (2 mg total) as needed in the evening for Anxiety.   2025    cyanocobalamin 1000 MCG Oral Tab Take 1 tablet (1,000 mcg total) by mouth daily.   Taking    FLUoxetine HCl 60 MG Oral Tab Take 60 mg by  mouth in the morning.   7/27/2025    polyethylene glycol, PEG 3350-KCl-NaBcb-NaCl-NaSulf, 236 g Oral Recon Soln Take 4,000 mL by mouth As Directed. Take 2,000 mL the night before your procedure and 2,000 mL the morning of your procedure. 1 each 0     SYNTHROID 100 MCG Oral Tab Take 1 tablet (100 mcg total) by mouth before breakfast. (Patient not taking: No sig reported) 90 tablet 0 Not Taking    HYDROcodone-acetaminophen 5-325 MG Oral Tab Take 1 tablet by mouth every 6 (six) hours. (Patient not taking: No sig reported) 21 tablet 0 Not Taking    ondansetron (ZOFRAN) 4 mg tablet Take 1 tablet (4 mg total) by mouth every 8 (eight) hours as needed for Nausea. (Patient not taking: No sig reported) 15 tablet 0 Not Taking    diclofenac 50 MG Oral Tab EC Take 1 tablet (50 mg total) by mouth daily. (Patient not taking: Reported on 6/13/2025)      [4]   Current Facility-Administered Medications Ordered in Epic   Medication Dose Route Frequency Provider Last Rate Last Admin    lactated ringers infusion   Intravenous Continuous Shahriar Tracey MD 20 mL/hr at 07/28/25 1325 New Bag at 07/28/25 1325     No current [facility]-ordered outpatient medications on file.   [5]   Allergies  Allergen Reactions    Morphine HIVES and SWELLING   [6]   Family History  Problem Relation Age of Onset    Breast Cancer Mother 60    Hypertension Mother     Cancer Mother 64        breast cancer    Breast Cancer Maternal Grandmother         50'S    Cancer Maternal Grandmother         breast cancer    Heart Disease Father         MI/PCI age 65    Hypertension Father     Ovarian Cancer Niece     Other (colitis) Daughter     Cancer Maternal Grandfather         pancreatic   [7]   Social History  Socioeconomic History    Marital status:    Tobacco Use    Smoking status: Every Day     Current packs/day: 1.00     Average packs/day: 1 pack/day for 25.0 years (25.0 ttl pk-yrs)     Types: Cigarettes    Smokeless tobacco: Never    Tobacco comments:     20  year smoker   Vaping Use    Vaping status: Never Used   Substance and Sexual Activity    Alcohol use: Yes     Comment: social    Drug use: Yes     Types: Cannabis     Comment: occasional   Other Topics Concern    Caffeine Concern No     Comment: 1 to 2 in the morning of coffee     Exercise No

## 2025-07-29 ENCOUNTER — TELEPHONE (OUTPATIENT)
Facility: CLINIC | Age: 50
End: 2025-07-29

## 2025-07-29 VITALS
DIASTOLIC BLOOD PRESSURE: 84 MMHG | HEIGHT: 57 IN | BODY MASS INDEX: 19.41 KG/M2 | WEIGHT: 90 LBS | SYSTOLIC BLOOD PRESSURE: 119 MMHG | HEART RATE: 51 BPM | RESPIRATION RATE: 12 BRPM | OXYGEN SATURATION: 100 %

## 2025-07-29 DIAGNOSIS — K62.9 ANAL LESION: ICD-10-CM

## 2025-07-29 DIAGNOSIS — R10.9 ABDOMINAL PAIN, UNSPECIFIED ABDOMINAL LOCATION: Primary | ICD-10-CM

## (undated) DIAGNOSIS — R10.9 ABDOMINAL PAIN, ACUTE: ICD-10-CM

## (undated) DEVICE — 35 ML SYRINGE REGULAR TIP: Brand: MONOJECT

## (undated) DEVICE — TUBING SCT CLR 6FT .25IN MDVC

## (undated) DEVICE — FORCEP RADIAL JAW 4

## (undated) DEVICE — LINE MNTR ADLT SET O2 INTMD

## (undated) DEVICE — TROCAR: Brand: KII® SLEEVE

## (undated) DEVICE — INSUFFLATION NEEDLE TO ESTABLISH PNEUMOPERITONEUM.: Brand: INSUFFLATION NEEDLE

## (undated) DEVICE — SOL  0.9% 1000ML

## (undated) DEVICE — SUT VICRYL 0 J608H

## (undated) DEVICE — Device

## (undated) DEVICE — KIT CLEAN ENDOKIT 1.1OZ GOWNX2

## (undated) DEVICE — CONMED SCOPE SAVER BITE BLOCK, 20X27 MM: Brand: SCOPE SAVER

## (undated) DEVICE — TROCAR: Brand: KII SHIELDED BLADED ACCESS SYSTEM

## (undated) DEVICE — Device: Brand: CUSTOM PROCEDURE KIT

## (undated) DEVICE — [HIGH FLOW INSUFFLATOR,  DO NOT USE IF PACKAGE IS DAMAGED,  KEEP DRY,  KEEP AWAY FROM SUNLIGHT,  PROTECT FROM HEAT AND RADIOACTIVE SOURCES.]: Brand: PNEUMOSURE

## (undated) DEVICE — TROCARS: Brand: KII® BLUNT TIP ACCESS SYSTEM

## (undated) DEVICE — TRAY SURESTEP 16 BARDEX DRAIN

## (undated) DEVICE — LIGACLIP 10-M/L, 10MM ENDOSCOPIC ROTATING MULTIPLE CLIP APPLIERS: Brand: LIGACLIP

## (undated) DEVICE — Device: Brand: JELCO

## (undated) DEVICE — SUT MONOCRYL 3-0 PS-2 Y497G

## (undated) DEVICE — MEDI-VAC NON-CONDUCTIVE SUCTION TUBING 6MM X 1.8M (6FT.) L: Brand: CARDINAL HEALTH

## (undated) DEVICE — GAUZE SPONGES,8 PLY: Brand: CURITY

## (undated) DEVICE — TROCAR: Brand: KII FIOS FIRST ENTRY

## (undated) DEVICE — KIT ENDO ORCAPOD 160/180/190

## (undated) DEVICE — LAP CHOLE: Brand: MEDLINE INDUSTRIES, INC.

## (undated) DEVICE — TISSUE RETRIEVAL SYSTEM: Brand: INZII RETRIEVAL SYSTEM

## (undated) DEVICE — SUT VICRYL 0 UR-6 J603H

## (undated) DEVICE — SUT ETHILON 3-0 FS-1 669H

## (undated) DEVICE — SUTURE PASSOR WITH GUIDE

## (undated) DEVICE — GAMMEX® PI HYBRID SIZE 7, STERILE POWDER-FREE SURGICAL GLOVE, POLYISOPRENE AND NEOPRENE BLEND: Brand: GAMMEX

## (undated) DEVICE — SOL NACL IRRIG 0.9% 1000ML BTL

## (undated) DEVICE — KIT MFLD FOR SPEC COLL

## (undated) DEVICE — 9534HP TRANSPARENT DRSG W/FRAME: Brand: 3M™ TEGADERM™

## (undated) NOTE — LETTER
[facility], [place]  Authorization for Invasive Procedures  1. I hereby authorize Dr. Hetal Rose , my physician and whomever may be designated as the doctor's assistant, to perform the following operation and/or procedure:  Esophagogastroduodenoscopy (EGD) and Colonoscopy on Ligia Gannon at [facility].    2. My physician has explained to me the nature and purpose of the operation or other procedure, possible alternative methods of treatment, the risks involved and the possibility of complications to me. I understand the probable consequences of declining the recommended procedure and the alternative methods of treatment. I acknowledge that no guarantee has been made as to the result that may be obtained. 3. I recognize that during the course of this operation or other procedure, unforeseen conditions may necessitate additional or different procedures than those listed above. I, therefore, further authorize and request that the above-named physician, his/her physician assistants, or designees perform such procedures as are, in his/her professional opinion, necessary and desirable. If I have a Do Not Attempt Resuscitation (DNAR) order in place, that status will be suspended while in the operating room, procedural suite, and during the recovery period unless otherwise explicitly stated by me (or a person authorized to consent on my behalf). The surgeon or my attending physician will determine when the applicable recovery period ends for purposes of reinstating the DNAR order. 4. Should the need arise during my operation or immediate post-operative period; I also consent to the administration of blood and/or blood products.  Further, I understand that despite careful testing and screening of blood and blood products, I may still be subject to ill effects as a result of recieving a blood transfusion an/or blood producst. The following are some, but not all, of the potential risks that can occur: fever and allergic reactions, hemolytic reactions, transmission of disease such as hepatitis, AIDS, cytomegalovirus (CMV), and flluid overload. In the event that I wish to have autologous transfusions of my own blood, or a directed donor transfusion, I will discuss this with my physician. 5. I consent to the photographing of the operations or procedures to be performed for the purposes of advancing medicine, science, and/or education, provided my identity is not revealed. If the procedure has been videotaped, the physician/surgeon will obtain the original videotape. The hospital will not be responsible for storage or maintenance of this tape. 6. I consent to the presence of a  or observer as deemed necessary by my physician or his designee. 7. Any tissues or organs removed in the operation or other procedure may be disposed of by and at the discretion of [facility].    8. I understand that the physician and his/her physician assistants may not be employees or agents of [facility], [facility], nor [facility], but are independent medical practitioners who have been permitted to use its facilities for the care and treatment of their patients. 9. Patients having a sterilization procedure: I understand that if the procedure is successful the results will be permanent and it will therefore be impossible for me to inseminate, conceive or bear children. I also understand that the procedure is intended to result in sterility, although the result has not been guaranteed. 10. I CERTIFY THAT I HAVE READ AND FULLY UNDERSTAND THE ABOVE CONSENT TO OPERATION and/or OTHER PROCEDURE. 11. I acknowledge that my physician has explained sedation/analgesia administration to me including the risks and benefits.  I consent to the administration of sedation/analgesia as may be necessary or desirable in the judgment of my physician. Signature of Patient:  ________________________________________________ Date: _________Time: _________    Responsible person in case of minor or unconscious: _____________________________Relationship: ____________     Witness Signature: ____________________________________________ Date: __________ Time: ___________    Statement of Physician  My signature below affirms that prior to the time of the procedure, I have explained to the patient and/or her legal representative, the risks and benefits involved in the proposed treatment and any reasonable alternative to the proposed treatment. I have also explained the risks and benefits involved in the refusal of the proposed treatment and have answered the patient's questions. If I have a significant financial interest in this procedure/surgery, I have disclosed this and had a discussion with my patient.     Signature of Physician:   ________________________________________Date: _________Time:_______ Patient Name: Diana Jimenez  : 10/16/1975   Printed: 2022    Medical Record #: [id number]

## (undated) NOTE — LETTER
[facility], [place], [place]  [address]  [address] [facility]  [phone]            12.03.19      Anatoly Strong,     Your labs have returned. Your vitamin D is low. Ideal levels are closer to 60.  I re It looks like you have Hashimoto's thyroid disease. This means that you have the autoimmune thyroid disease. Right now your thyroid numbers seems more stable. Replenishing some of your nutrient deficiencies will be helpful in keeping your thyroid stable.  I

## (undated) NOTE — LETTER
12/26/19        [facility] [address]      Dear Johana Olson records indicate that you have outstanding lab work and or testing that was ordered for you and has not yet been completed:  Orders Placed This [place]

## (undated) NOTE — LETTER
11/26/2024    Ligia Gannon        [address] [facility] [address]            Dear Ligia Gannon,      Our records indicate that you are due for an appointment for a Colonoscopy with Shahriar Tracey MD. Our doctors are booking out about 3-6 months in advance for procedures.     Please call our office to schedule a phone screening appointment to plan for the procedure(s).   Your medical well-being is important to us.    If your insurance requires a referral, please call your primary care office to request one.      Thank you,      The Physicians and Staff at [facility]

## (undated) NOTE — LETTER
[address], [place]  Authorization for Invasive Procedures  1. I hereby authorize Dr. Emery Ferrer , my physician and whomever may be designated as the doctor's assistant, to perform the following operation and/or procedure:  esophagogastroduodenoscopy and colonoscopy on Ligia Gannon at [facility].    2. My physician has explained to me the nature and purpose of the operation or other procedure, possible alternative methods of treatment, the risks involved and the possibility of complications to me. I understand the probable consequences of declining the recommended procedure and the alternative methods of treatment. I acknowledge that no guarantee has been made as to the result that may be obtained. 3. I recognize that during the course of this operation or other procedure, unforeseen conditions may necessitate additional or different procedures than those listed above. I, therefore, further authorize and request that the above-named physician, his/her physician assistants, or designees perform such procedures as are, in his/her professional opinion, necessary and desirable. If I have a Do Not Attempt Resuscitation (DNAR) order in place, that status will be suspended while in the operating room, procedural suite, and during the recovery period unless otherwise explicitly stated by me (or a person authorized to consent on my behalf). The surgeon or my attending physician will determine when the applicable recovery period ends for purposes of reinstating the DNAR order. 4. Should the need arise during my operation or immediate post-operative period; I also consent to the administration of blood and/or blood products.  Further, I understand that despite careful testing and screening of blood and blood products, I may still be subject to ill effects as a result of recieving a blood transfusion an/or blood producst. The following are some, but not all, of the potential risks that can occur: fever and allergic reactions, hemolytic reactions, transmission of disease such as hepatitis, AIDS, cytomegalovirus (CMV), and flluid overload. In the event that I wish to have autologous transfusions of my own blood, or a directed donor transfusion, I will discuss this with my physician. 5. I consent to the photographing of the operations or procedures to be performed for the purposes of advancing medicine, science, and/or education, provided my identity is not revealed. If the procedure has been videotaped, the physician/surgeon will obtain the original videotape. [facility] will not be responsible for storage or maintenance of this tape. 6. I consent to the presence of a  or observer as deemed necessary by my physician or his designee. 7. Any tissues or organs removed in the operation or other procedure may be disposed of by and at the discretion of [facility].    8. I understand that the physician and his/her physician assistants may not be employees or agents of [facility], [facility], [address], but are independent medical practitioners who have been permitted to use its facilities for the care and treatment of their patients. 9. Patients having a sterilization procedure: I understand that if the procedure is successful the results will be permanent and it will therefore be impossible for me to inseminate, conceive or bear children. I also understand that the procedure is intended to result in sterility, although the result has not been guaranteed. 10. I CERTIFY THAT I HAVE READ AND FULLY UNDERSTAND THE ABOVE CONSENT TO OPERATION and/or OTHER PROCEDURE. 11. I acknowledge that my physician has explained sedation/analgesia administration to me including the risks and benefits.  I consent to the administration of sedation/analgesia as may be necessary or desirable in the judgment of my physician. Signature of Patient:  ________________________________________________ Date: _________Time: _________    Responsible person in case of minor or unconscious: _____________________________Relationship: ____________     Witness Signature: ____________________________________________ Date: __________ Time: ___________    Statement of Physician  My signature below affirms that prior to the time of the procedure, I have explained to the patient and/or her legal representative, the risks and benefits involved in the proposed treatment and any reasonable alternative to the proposed treatment. I have also explained the risks and benefits involved in the refusal of the proposed treatment and have answered the patient's questions. If I have a significant financial interest in this procedure/surgery, I have disclosed this and had a discussion with my patient.     Signature of Physician:   ________________________________________Date: _________Time:_______ Patient Name: Yamilet Chi  : 10/16/1975   Printed: 2022    Medical Record #: [id number]

## (undated) NOTE — LETTER
July 31, 2019    [address] [place] [address]      Dear Jorge Tejada: The following are the results of your recent tests. Please review the list of test results.   Your result is the value on the left; we have also supplied the range o

## (undated) NOTE — LETTER
9/4/2019              Ligia ROSE [address] [place] [address]         Dear Karishma Deleon records indicate that the THYROID ULTRASOUND ordered for you by Kyra Major MD  have not been done.   If you have, in fact, already comp

## (undated) NOTE — LETTER
[facility]  Administration of Anesthesia  1. Ann Tellez, or _________________________________ acting on her behalf, (Patient) (Dependent/Representative) request to receive anesthesia for my pending procedure/operation/treatment. A physician (anesthesiologist) alone or an anesthesiologist working with a nurse anesthetist may administer my anesthesia. 2. I understand that my anesthesiologist is not an employee or agent of the hospital, but is an independent medical practitioner who has been permitted to use its facilities for the care and treatment of his/her patients. 3. I acknowledge that a physician from [facility] or their designate(s), recommended anesthesia for me using her/his medical judgment. The type(s) of anesthesia I may receive include:                a) General Anesthesia, b) Spinal/Epidural Anesthesia, c) Regional Anesthesia or d) Monitored Anesthesia Care. 4. If my spinal, regional or monitored anesthesia care (local) is not satisfactory for my comfort, or if my medical condition requires, I consent to the administration of general anesthesia. 5. I am aware that the practice of anesthesiology is not an exact science and that some foreseeable risks or consequences may occur. Some common risks/consequences include sore throat and hoarseness, nausea and vomiting, muscle soreness, backache, damage to the mouth/teeth/vocal cords and eye injury. I understand that more rare but serious potential risks of anesthesia include blood pressure changes, drug reactions, cardiac arrest, brain damage, paralysis or death. These risks apply to whether I have general, spinal/epidural, regional or monitored anesthesia care. 6. OBSTETRIC PATIENTS: Specific risks/consequences of spinal/epidural anesthesia may include itching, low blood pressure, difficulty urinating, slowing of the baby's heart rate and headache.  Rare risks include infections, high spinal block, spinal bleeding, seizure, cardiac arrest and death. 7. AWARENESS: I understand that it is possible (but unlikely) to have explicit memory of events from the operating room while under general anesthesia. 8. ELECTROCONVULSIVE THERAPY PATIENTS: This consent serves for all treatments in a single course of therapy. 9. I understand that I must inform my anesthesiologist when I last ate and/or drank to minimize the risk of anesthesia. 10. If I am pregnant, or may pregnant, I understand that elective surgery should be postponed until after the baby is born. Anesthetics cross the placenta and may temporarily anesthetize the baby. Although fetal complications of anesthesia during pregnancy are rare, they may include birth defects, premature labor, brain damage and death. 11. I certify that I informed the anesthesiologist, to the best of my ability, about medication I take including blood thinners, anticoagulants, herbal remedies, narcotics and recreational drugs (e.g. cocaine, marijuana, PCP). Failure to inform my anesthesiologist about these medicines may increase my risk of anesthetic complications. The nature and purpose of my anesthetic management was explained to me. I had the opportunity to ask questions and the answers and information provided meet my satisfaction.   I retain the right to withdraw this consent at any time prior to the administration of said anesthetic.    ___________________________________________________           _____________________________________________________  Patient Signature                                                                                      Witness Signature                ___________________________________________________           _____________________________________________________  Date/Time                                                                                               Responsible person in case of minor/ unconscious pt /Relationship    My signature below affirms that prior to the time of the procedure, I have explained to the patient and/or his/her guardian, the risks and benefits of undergoing anesthesia, as well as any reasonable alternatives.     ___________________________________________________            _____________________________________________________  Physician Signature                            Date/Time  Patient Name: Gunner Anna     : 10/16/1975     Printed: 2022      Medical Record #: [id number]                              Page 1 of 1    ----------ANESTHESIA CONSENT----------

## (undated) NOTE — LETTER
[address], [place]  Authorization for Invasive Procedures  Date: 08/17/2023           Time: 0736    I hereby authorize Fiordaliza Luis MD, my physician and his/her assistants (if applicable), which may include medical students, residents, and/or fellows, to perform the following surgical operation/ procedure and administer such anesthesia as may be determined necessary by my physician: Laparoscopic cholecystectomy on Ligia Gannon  2. I recognize that during the surgical operation/procedure, unforeseen conditions may necessitate additional or different procedures than those listed above. I, therefore, further authorize and request that the above-named surgeon, assistants, or designees perform such procedures as are, in their judgment, necessary and desirable. 3.   My surgeon/physician has discussed prior to my surgery the potential benefits, risks and side effects of this procedure; the likelihood of achieving goals; and potential problems that might occur during recuperation. They also discussed reasonable alternatives to the procedure, including risks, benefits, and side effects related to the alternatives and risks related to not receiving this procedure. I have had all my questions answered and I acknowledge that no guarantee has been made as to the result that may be obtained. 4.   Should the need arise during my operation/procedure, which includes change of level of care prior to discharge, I also consent to the administration of blood and/or blood products. Further, I understand that despite careful testing and screening of blood or blood products by collecting agencies, I may still be subject to ill effects as a result of receiving a blood transfusion and/or blood products.   The following are some, but not all, of the potential risks that can occur: fever and allergic reactions, hemolytic reactions, transmission of diseases such as Hepatitis, AIDS and Cytomegalovirus (CMV) and fluid overload. In the event that I wish to have an autologous transfusion of my own blood, or a directed donor transfusion, I will discuss this with my physician. Check only if Refusing Blood or Blood Products  I understand refusal of blood or blood products as deemed necessary by my physician may have serious consequences to my condition to include possible death. I hereby assume responsibility for my refusal and release the hospital, its personnel, and my physicians from any responsibility for the consequences of my refusal.         o  Refuse         5. I authorize the use of any specimen, organs, tissues, body parts or foreign objects that may be removed from my body during the operation/procedure for diagnosis, research or teaching purposes and their subsequent disposal by hospital authorities. I also authorize the release of specimen test results and/or written reports to my treating physician on the hospital medical staff or other referring or consulting physicians involved in my care, at the discretion of the Pathologist or my treating physician. 6.   I consent to the photographing or videotaping of the operations or procedures to be performed, including appropriate portions of my body for medical, scientific, or educational purposes, provided my identity is not revealed by the pictures or by descriptive texts accompanying them. If the procedure has been photographed/videotaped, the surgeon will obtain the original picture, image, videotape or CD. The hospital will not be responsible for storage, release or maintenance of the picture, image, tape or CD.    7.   I consent to the presence of a  or observers in the operating room as deemed necessary by my physician or their designees. 8.   I recognize that in the event my procedure results in extended X-Ray/fluoroscopy time, I may develop a skin reaction. 9.  If I have a Do Not Attempt Resuscitation (DNAR) order in place, that status will be suspended while in the operating room, procedural suite, and during the recovery period unless otherwise explicitly stated by me (or a person authorized to consent on my behalf). The surgeon or my attending physician will determine when the applicable recovery period ends for purposes of reinstating the DNAR order. 10. Patients having a sterilization procedure: I understand that if the procedure is successful the results will be permanent and it will therefore be impossible for me to inseminate, conceive, or bear children. I also understand that the procedure is intended to result in sterility, although the result has not been guaranteed. 11. I acknowledge that my physician has explained sedation/analgesia administration to me including the risk and benefits I consent to the administration of sedation/analgesia as may be necessary or desirable in the judgment of my physician. I CERTIFY THAT I HAVE READ AND FULLY UNDERSTAND THE ABOVE CONSENT TO OPERATION and/or OTHER PROCEDURE.        ____________________________________       _________________________________      ______________________________  Signature of Patient         Signature of Responsible Person        Printed Name of Responsible Person        ____________________________________      _________________________________      ______________________________       Signature of Witness          Relationship to Patient                       Date                                       Time    Patient Name: Lyndsey Diaz     : 10/16/1975                 Printed: 2023      Medical Record #: [id number]                      Page 1 of 2          STATEMENT OF PHYSICIAN My signature below affirms that prior to the time of the procedure; I have explained to the patient and/or his/her legal representative, the risks and benefits involved in the proposed treatment and any reasonable alternative to the proposed treatment.  I have also explained the risks and benefits involved in refusal of the proposed treatment and alternatives to the proposed treatment and have answered the patient's questions.  If I have a significant financial interest in a co-management agreement or a significant financial interest in any product or implant, or other significant relationship used in this procedure/surgery, I have disclosed this and had a discussion with my patient.     _______________________________________________________________ _____________________________  Jeraline Starch of Physician)                                                                                         (Date)                                   (Time)    Patient Name: Lillie Barriga     : 10/16/1975                 Printed: 2023      Medical Record #: [id number]                      Page 2 of 2

## (undated) NOTE — LETTER
01/21/22        Ligia Steward      Dear Nataliia Andres records indicate that you have outstanding lab work and or testing that was ordered for you and has not yet been completed:     Lyle Escobar

## (undated) NOTE — LETTER
2/25/2019              Ligia Gannon        [address] [facility] [address]         Dear Broderick Wall,    Our records indicate that the tests ordered for you by Petra Jones MD  have not been done.   If you have, in fact, already complet

## (undated) NOTE — LETTER
July 31, 2019    [address] [place] [address]      Dear Barry Navarro: The following are the results of your recent tests. Please review the list of test results.   Your result is the value on the left; we have also supplied the range o

## (undated) NOTE — LETTER
07/29/19    You will need to return to clinic in 48-72 hours to have results of TB test read. Please return to clinic on 7/31/19 after 6:20 p.m or on 8/1/19 before 6:20 p.m to have your TB test read.     If you do not return during this time your test w